# Patient Record
Sex: FEMALE | Race: BLACK OR AFRICAN AMERICAN | Employment: OTHER | URBAN - METROPOLITAN AREA
[De-identification: names, ages, dates, MRNs, and addresses within clinical notes are randomized per-mention and may not be internally consistent; named-entity substitution may affect disease eponyms.]

---

## 2018-03-19 LAB
ANTIBODY: NON REACTIVE
HIV AG/AB: NON REACTIVE

## 2020-08-28 ENCOUNTER — VIRTUAL VISIT (OUTPATIENT)
Dept: FAMILY MEDICINE CLINIC | Age: 55
End: 2020-08-28
Payer: MEDICAID

## 2020-08-28 DIAGNOSIS — F51.01 PRIMARY INSOMNIA: ICD-10-CM

## 2020-08-28 DIAGNOSIS — F32.89 OTHER DEPRESSION: ICD-10-CM

## 2020-08-28 DIAGNOSIS — F51.01 PRIMARY INSOMNIA: Primary | ICD-10-CM

## 2020-08-28 DIAGNOSIS — G89.29 OTHER CHRONIC PAIN: ICD-10-CM

## 2020-08-28 DIAGNOSIS — J45.41 MODERATE PERSISTENT ASTHMA WITH ACUTE EXACERBATION: Primary | ICD-10-CM

## 2020-08-28 PROCEDURE — 99203 OFFICE O/P NEW LOW 30 MIN: CPT | Performed by: NURSE PRACTITIONER

## 2020-08-28 RX ORDER — ALBUTEROL SULFATE 90 UG/1
2 AEROSOL, METERED RESPIRATORY (INHALATION)
COMMUNITY
End: 2022-08-23 | Stop reason: SDUPTHER

## 2020-08-28 RX ORDER — ZOLPIDEM TARTRATE 10 MG/1
10 TABLET ORAL
COMMUNITY
End: 2020-08-28 | Stop reason: SDUPTHER

## 2020-08-28 RX ORDER — FLUTICASONE PROPIONATE 50 MCG
2 SPRAY, SUSPENSION (ML) NASAL DAILY
COMMUNITY
End: 2020-08-28 | Stop reason: SDUPTHER

## 2020-08-28 RX ORDER — MELOXICAM 15 MG/1
15 TABLET ORAL DAILY
COMMUNITY
End: 2020-09-29

## 2020-08-28 RX ORDER — DULOXETIN HYDROCHLORIDE 60 MG/1
60 CAPSULE, DELAYED RELEASE ORAL DAILY
COMMUNITY
End: 2020-09-29

## 2020-08-28 RX ORDER — PANTOPRAZOLE SODIUM 40 MG/1
40 TABLET, DELAYED RELEASE ORAL DAILY
COMMUNITY
End: 2020-09-29

## 2020-08-28 RX ORDER — VENLAFAXINE HYDROCHLORIDE 150 MG/1
150 CAPSULE, EXTENDED RELEASE ORAL DAILY
COMMUNITY
End: 2020-09-29

## 2020-08-28 RX ORDER — CYCLOBENZAPRINE HCL 5 MG
5 TABLET ORAL
COMMUNITY
End: 2020-08-28 | Stop reason: SDUPTHER

## 2020-08-28 RX ORDER — PREDNISONE 10 MG/1
10 TABLET ORAL DAILY
COMMUNITY
End: 2020-09-29

## 2020-08-28 RX ORDER — MONTELUKAST SODIUM 10 MG/1
10 TABLET ORAL DAILY
COMMUNITY
End: 2020-08-28 | Stop reason: SDUPTHER

## 2020-08-28 RX ORDER — HYDROCODONE BITARTRATE AND ACETAMINOPHEN 5; 325 MG/1; MG/1
2 TABLET ORAL
COMMUNITY

## 2020-08-28 RX ORDER — METHYLPREDNISOLONE 4 MG/1
TABLET ORAL
Qty: 1 DOSE PACK | Refills: 0 | Status: SHIPPED | OUTPATIENT
Start: 2020-08-28 | End: 2020-09-29

## 2020-08-28 RX ORDER — METAXALONE 800 MG/1
800 TABLET ORAL 2 TIMES DAILY
COMMUNITY
End: 2020-09-29

## 2020-08-28 RX ORDER — FLUTICASONE PROPIONATE AND SALMETEROL 250; 50 UG/1; UG/1
1 POWDER RESPIRATORY (INHALATION) 2 TIMES DAILY
COMMUNITY
End: 2020-09-29

## 2020-08-28 NOTE — PROGRESS NOTES
Chief Complaint   Patient presents with    Establish Care     History of Rheumatoid Arthritis, Allergies , Asthma  . Has an referral for a rheumatoid arthritis     Asthma     has been having some breathing issues even with inhaler and prednisone .       Sleep Problem

## 2020-08-28 NOTE — PROGRESS NOTES
Tahir Banks  50 y.o. female  1965  [facility]  [place]. [place] [address]  [phone]     [facility]       Encounter Date: 8/28/2020           Established Patient Visit Note: Leilani Campos NP    Reason for Appointment:  Chief Complaint   Patient presents with   [address] [place] [address] Care     History of Rheumatoid Arthritis, Allergies , Asthma  . Has an referral for a rheumatoid arthritis     Asthma     has been having some breathing issues even with inhaler and prednisone .  Sleep Problem       History of Present Illness:  History provided by patient    Tahir Banks is a 50 y.o. female who presents today for SOB and excerebration of astma. She is taking her inhalers and maira henderson takes 10 mg prednisone daily. She is a new patient from [address] [place] [address]. She needs to establish with pain management and a rheumatologist       Review of Systems  Review of Systems   Constitutional: Negative. HENT: Negative. Respiratory: Positive for cough, shortness of breath and wheezing. Cardiovascular: Negative. Gastrointestinal: Negative. Psychiatric/Behavioral: The patient has insomnia. Allergies: Amoxicillin and Azithromycin    Medications: (Updated to reflect final medication list after visit)    Current Outpatient Medications:     HYDROcodone-acetaminophen (NORCO) 5-325 mg per tablet, Take 2 Tabs by mouth every four (4) hours as needed for Pain., Disp: , Rfl:     predniSONE (DELTASONE) 10 mg tablet, Take 10 mg by mouth daily. , Disp: , Rfl:     albuterol (PROVENTIL HFA, VENTOLIN HFA, PROAIR HFA) 90 mcg/actuation inhaler, Take 2 Puffs by inhalation every four (4) hours as needed for Wheezing., Disp: , Rfl:     fluticasone propion-salmeteroL (Advair Diskus) 250-50 mcg/dose diskus inhaler, Take 1 Puff by inhalation two (2) times a day., Disp: , Rfl:     montelukast (SINGULAIR) 10 mg tablet, Take 10 mg by mouth daily. , Disp: , Rfl:     zolpidem (AMBIEN) 10 mg tablet, Take 10 mg by mouth nightly as needed for Sleep., Disp: , Rfl:     venlafaxine-SR (EFFEXOR-XR) 150 mg capsule, Take 150 mg by mouth daily. , Disp: , Rfl:     meloxicam (MOBIC) 15 mg tablet, Take 15 mg by mouth daily. , Disp: , Rfl:     pantoprazole (PROTONIX) 40 mg tablet, Take 40 mg by mouth daily. , Disp: , Rfl:     cyclobenzaprine (FLEXERIL) 5 mg tablet, Take 5 mg by mouth nightly., Disp: , Rfl:     metaxalone (SKELAXIN) 800 mg tablet, Take 800 mg by mouth two (2) times a day., Disp: , Rfl:     DULoxetine (CYMBALTA) 60 mg capsule, Take 60 mg by mouth daily. , Disp: , Rfl:     fluticasone propionate (Flonase Allergy Relief) 50 mcg/actuation nasal spray, 2 Sprays by Both Nostrils route daily. , Disp: , Rfl:     methylPREDNISolone (MEDROL DOSEPACK) 4 mg tablet, Use as directed, Disp: 1 Dose Pack, Rfl: 0    History  Patient Care Team:  Caleb Pro NP as PCP - General (Family Medicine)    Past Medical History: she has a past medical history of Asthma and Rheumatoid arthritis ([facility] [address] Past Surgical History: she has a past surgical history that includes hx knee arthroscopy. Family Medical History: family history includes Arthritis-osteo in her mother; Arthritis-rheumatoid in her father; Hypertension in her mother; Stroke in her father. Social History: she reports that she has never smoked. She has never used smokeless tobacco. She reports that she does not drink alcohol or use drugs. No specialty comments available. Objective:   Vital Signs  Unable to obtain vital signs today as patient does not have equipment for this at home    Physical Exam  Constitutional:       Appearance: Normal appearance. HENT:      Head: Normocephalic. Right Ear: External ear normal.      Left Ear: External ear normal.   Neck:      Musculoskeletal: Normal range of motion and neck supple. Neurological:      Mental Status: She is alert.    Psychiatric:         Mood and Affect: Mood normal.         Thought Content: Thought content normal.         Assessment & Plan:    1. Moderate persistent asthma with acute exacerbation    - methylPREDNISolone (MEDROL DOSEPACK) 4 mg tablet; Use as directed  Dispense: 1 Dose Pack; Refill: 0  - REFERRAL TO PULMONARY DISEASE    2. Primary insomnia      3. Other depression        I was in the office while conducting this encounter. Consent:  She and/or her healthcare decision maker is aware that this patient-initiated Telehealth encounter is a billable service, with coverage as determined by her insurance carrier. She is aware that she may receive a bill and has provided verbal consent to proceed: Yes    This virtual visit was conducted via [employer]. Pursuant to the emergency declaration under the [facility], 1135 waiver authority and the [employer] and [employer] Act, this Virtual  Visit was conducted to reduce the patient's risk of exposure to COVID-19 and provide continuity of care for an established patient. Services were provided through a video synchronous discussion virtually to substitute for in-person clinic visit. Due to this being a TeleHealth evaluation, many elements of the physical examination are unable to be assessed. Total Time: minutes: 21-30 minutes. I have discussed the diagnosis with the patient and the intended plan as seen in the above orders. The patient has received an after-visit summary along with patient information handout. I have discussed medication side effects and warnings with the patient as well.     Disposition        Lucretia Carrasco NP

## 2020-08-28 NOTE — TELEPHONE ENCOUNTER
Chief Complaint   Patient presents with    Medication Refill     Flonase, Zolpidem, Flexeril and Singulair      Patient seen virtually to establish care on 8/28/2020.   Shahab Loaiza LPN

## 2020-08-30 RX ORDER — ZOLPIDEM TARTRATE 10 MG/1
10 TABLET ORAL
Qty: 90 TAB | Refills: 1 | Status: SHIPPED | OUTPATIENT
Start: 2020-08-30 | End: 2021-02-26

## 2020-08-30 RX ORDER — CYCLOBENZAPRINE HCL 5 MG
5 TABLET ORAL
Qty: 90 TAB | Refills: 1 | Status: SHIPPED | OUTPATIENT
Start: 2020-08-30 | End: 2021-02-26

## 2020-08-30 RX ORDER — MONTELUKAST SODIUM 10 MG/1
10 TABLET ORAL DAILY
Qty: 90 TAB | Refills: 1 | Status: SHIPPED | OUTPATIENT
Start: 2020-08-30 | End: 2021-02-26

## 2020-08-30 RX ORDER — FLUTICASONE PROPIONATE 50 MCG
2 SPRAY, SUSPENSION (ML) NASAL DAILY
Qty: 1 BOTTLE | Refills: 3 | Status: SHIPPED | OUTPATIENT
Start: 2020-08-30 | End: 2021-02-26

## 2020-09-29 ENCOUNTER — OFFICE VISIT (OUTPATIENT)
Dept: RHEUMATOLOGY | Age: 55
End: 2020-09-29
Payer: MEDICAID

## 2020-09-29 VITALS
TEMPERATURE: 98.3 F | SYSTOLIC BLOOD PRESSURE: 146 MMHG | RESPIRATION RATE: 18 BRPM | DIASTOLIC BLOOD PRESSURE: 82 MMHG | WEIGHT: 241 LBS | HEART RATE: 81 BPM

## 2020-09-29 DIAGNOSIS — R76.8 POSITIVE ANTI-CCP TEST: ICD-10-CM

## 2020-09-29 DIAGNOSIS — R20.2 PARESTHESIA: ICD-10-CM

## 2020-09-29 DIAGNOSIS — F43.9 STRESS: ICD-10-CM

## 2020-09-29 DIAGNOSIS — M79.7 FIBROMYALGIA: Primary | ICD-10-CM

## 2020-09-29 PROCEDURE — 99205 OFFICE O/P NEW HI 60 MIN: CPT | Performed by: INTERNAL MEDICINE

## 2020-09-29 RX ORDER — BUDESONIDE AND FORMOTEROL FUMARATE DIHYDRATE 160; 4.5 UG/1; UG/1
2 AEROSOL RESPIRATORY (INHALATION)
COMMUNITY

## 2020-09-29 RX ORDER — BISMUTH SUBSALICYLATE 262 MG
1 TABLET,CHEWABLE ORAL DAILY
COMMUNITY

## 2020-09-29 RX ORDER — FEXOFENADINE HCL AND PSEUDOEPHEDRINE HCI 60; 120 MG/1; MG/1
1 TABLET, EXTENDED RELEASE ORAL EVERY 12 HOURS
COMMUNITY

## 2020-09-29 NOTE — LETTER
9/29/20 Patient: Maddison Colbert YOB: 1965 Date of Visit: 9/29/2020 Abner Pozo NP 
[address] [id number] VIA In Basket Dear Abner Pozo NP, Thank you for referring Ms. Maddison Colbert to [address] [place] [address] for evaluation. My notes for this consultation are attached. If you have questions, please do not hesitate to call me. I look forward to following your patient along with you.  
 
 
Sincerely, 
 
Gian Ramachandran MD

## 2020-09-29 NOTE — PATIENT INSTRUCTIONS
FIBROMYALGIA Fibromyalgia is a disease characterized by chronic widespread musculoskeletal pain. Fibromyalgia is caused by abnormal processing of pain signals in the central nervous system, leading to exaggerated pain responses. There are functional MRI studies that have shown neuroplasticity (re-wiring) of the pain pathways in the brain. Physical and Mental Exercise The mainstay of therapy includes non-pharmacologic therapies such as cardiovascular exercise and Cognitive Behavioral Therapy which have been shown to be of benefit ([facility], Am J Med 2009). Guido Chi in particular has proven efficacy in the treatment of fibromyalgia Archie Clarke et al. [place] Pay[employer] J Med 2010; 871:756-775). Performing at least 60 minutes per day of Ross Spine has been shown to improve pain without medical management. Medications After discussing with your primary care and if medications are pursued, pregabalin (Lyrica), gabapentin (Neurontin), milnacipran (Marcelene Armando), and duloxetine (Cymbalta) are FDA approved medications for the treatment of fibromyalgia. Narcotic Pain Medications Are BAD Narcotics, such as oxycodone, hydrocodone, morphine, dilaudid, or codeine, have not been proven to be efficacious in the treatment of fibromyalgia. In fact, narcotic use in this patient population has been observed to exacerbate depression, and may enhance the hyperalgesia which is characteristic of this condition (Sonia Enter Rheum 2006). They also are at increased risk for opioid-induced hyperalgesia due predominantly to central sensitization Babak Momin al. [facility] Clin Rheumatol. 2013 Mar;19(2):72-7). Specifically, a double-blind placebo-controlled trial by Owen Nam al published in 1995 demonstrated that intravenous morphine did not reduce pain in fibromyalgia patients.  A study by Raquel Corona al published in 2003 showed that fibromyalgia patients taking oral opiates did not experience improvement in their pain at four years of follow up, and also reported increased depression over the last two years of the study. There is subsequent concern that the prolonged use of narcotics to treat fibromyalgia may cause harm to these patients Elsy Montoya, Pain 2005). Finally, opioid use in fibromyalgia had poorer symptoms and functional and occupational status compared to nonusers (Daly OCHOA et al. Pain Res Treat. 9189;1094:087795). Therefore, I recommend that narcotics be avoided in all patients with fibromyalgia. My Recommendations I recommend Guido Chi stretching exercises for at least 30 minutes per day. The Arthritis Foundation has made a videotape of Guido Chi that you can borrow from [employer], purchase online or watch for free on [employer]. com Guido Chi for Arthritis. I strongly recommend you to join a gym that has an indoor pool, to do aqua therapy and Guido Chi classes. Resources include: [facility], [facility], [facility], and the [facility]. We discussed treating secondary causes, such as sleep apnea, poor sleep quality, depression, anxiety, vitamin deficiencies, such as vitamin D, and pursuing aquatherapy. I encourage you to do [address]. Tingling and Tremors Please see neurology

## 2020-09-29 NOTE — PROGRESS NOTES
REASON FOR VISIT    This is an evaluation for Ms. Joslyn Romberg a 50 y.o.  female for diffuse pain. The patient is referred to the [facility] at the request of Halie Christina NP.     HISTORY OF PRESENT ILLNESS     I have reviewed and summarized old records from [address] [place] [address],[address], [facility]    In 1/30/2017, she developed pain in her neck, arms, hands, back, knees, feet and toes that was constant. There was no preceding trauma, injury, motor vehicle accident sudden loss, physical or sexual abuse but she recalls suffering with stress due to take care of her mother for 10 years and her father prior to that while working. In 2018, she was evaluated by rheumatology. She did not feel relief with prednisone. She then was given methotrexate 10 mg weekly without relief after one to 2 months. In 11/25/2019, labs showed WBC 5., lymphocytes 2.4, Hct 38.9%, platelets 699,374, creatinine 0.58 mg/dL, eGFR 123, albumin 4.6 g/dL, ALK 85 U/L, ALT 12 U/L, AST 20 U/L, ESR 5 mm/hr, CRP <1 mg/L, CK 85 U/L, uric acid 3.7 mg/dL, positive anti-CCP 34, negative ZANA IFA, rheumatoid factor, anti-dsDNA Ab, anti-RNP, anti-Sm, anti-Ro, anti-La, Quantiferon TB, chronic viral hepatitis panel. In 3/20/2020, labs showed WBC 5.3, Hct 38.3%, platelets 097,908, creatinine 0.60 mg/dL, eGFR 121, ALK 73 U/L, ALT 15 U/L, AST 25 U/L, ESR 10 mm/hr, CRP <1 mg/L. In 3/2020, she was prescribed hydroxychloroquine but she could not get it due to [place]. In 8/28/2020, she was prescribed Medrol for asthma exacerbation, which did not help her pain. In 9/04/2020, Chest radiograph showed HEART AND PULMONARY VESSELS: Heart size is normal. Normal pulmonary blood flow. LUNG PARENCHYMA: Underexpanded lungs. Ill-defined airspace opacity in the left lower lung zone partially obscuring the left heart margin. Remainder of lungs are clear. PLEURA: No pneumothorax or effusion. MEDIASTINUM: Normal mediastinal contour. BONE/SOFT TISSUES: Normal.    In 9/18/2020, CT Chest without contrast showed Lung parenchyma: Patchy airspace opacity in the lingular segment of left upper lobe accounts for opacity seen on previous chest radiograph. Focal infiltrate versus scarring. Calcified granuloma in the left lower lobe posterior laterally. No endobronchial lesion. No pneumothorax. Mediastinum and pleura:  Usual branching anatomy of the aortic arch. No mediastinal mass or adenopathy. No mediastinal fluid collection. No pleural or pericardial effusion. Upper abdomen: Gastric bypass anatomy. Previous cholecystectomy. Bony thorax: Degenerative spondylosis in the thoracic spine. Today, she reports that she has diffuse aching throbbing, stabbing, burning, numbness and shoot constant pain throughout her body. She has a hard time straightening her right elbow over the past 2 weeks. She was prescribed prednisone and Medrol without relief    She endorses numbness or tingling in hands, numbness or tingling in feet, muscle weakness. She also endorses action tremors. She has not seen a neurologist.     She endorses endorses pain in chest with deep breaths, wheezing, productive cough, shortness of breath at rest, shortness of breath on exertion. She is following with pulmonary, Dr. Dorothy Marte, for chronic asthma. REVIEW OF SYSTEMS    A 15 point review of systems was performed and summarized below. The questionnaire was reviewed with the patient and scanned into the patient's medical record.     General: endorses fatigue, weakness, denies recent weight gain, recent weight loss, fever, drenching night sweats  Musculoskeletal: endorses joint pain, morning stiffness (lasting all day), muscle pain, denies joint swelling  Ears: denies ringing in ears, hearing loss, deafness  Eyes: endorses pain, redness, blurred vision, dryness, foreign body sensation, denies light sensitive, blindness, double vision, excess tearing  Mouth: denies sore tongue, oral ulcers, loss of taste, dryness, increased dental caries  Nose: endorses nosebleeds, denies nasal ulcers  Throat: denies food stuck when swallowing, difficulty with swallowing, hoarseness, pain in jaw while chewing  Neck: denies swollen glands, tender glands  Cardiopulmonary: endorses pain in chest with deep breaths, wheezing, productive cough, shortness of breath at rest, shortness of breath on exertion, denies pain in chest when lying down, murmurs, sudden changes in heart beat, dry cough, coughing of blood  Gastrointestinal: denies nausea, heartburn, stomach pain relieved by food, chronic constipation, chronic diarrhea, blood in stools, black stools  Genitourinary: denies vaginal dryness, pain or burning on urination, blood in urine, cloudy urine, vaginal ulcers   Hematologic: denies anemia, bleeding tendency, blood clots, bleeding gums  Skin: endorses easy bruising, denies hair loss, rash, rash worsened after sun exposure, hives/urticaria, skin thickening, skin tightness, nodules/bumps, color changes of hands or feet in the cold (Raynaud's)  Neurologic: endorses numbness or tingling in hands, numbness or tingling in feet, muscle weakness  Psychiatric: endorses excessive worries, denies depression, PTSD, Bipolar  Sleep: endorses poor sleep (5-6 hours), difficulty falling asleep, difficulty staying asleep, denies snoring, apnea, daytime somnolence    PAST MEDICAL HISTORY    She has a past medical history of Asthma. FAMILY HISTORY    Her family history includes Arthritis-osteo in her mother; Arthritis-rheumatoid in her father; Heart Attack in her father; Hypertension in her mother; Stroke in her father. SOCIAL HISTORY    She reports that she has never smoked. She has never used smokeless tobacco. She reports that she does not drink alcohol or use drugs.     GYNECOLOGIC HISTORY     1, Para 1, Living 1, Miscarriage 0    She denies severe pre-eclampsia, eclampsia or placental insufficiency    HEALTH MAINTENANCE    Immunizations    There is no immunization history on file for this patient. Age Appropriate Cancer Screening    PAP Smear: 2019  Mammogram: 2019    MEDICATIONS    Current Outpatient Medications   Medication Sig Dispense Refill    multivitamin (ONE A DAY) tablet Take 1 Tab by mouth daily.  fexofenadine-pseudoephedrine (Allegra-D 12 Hour)  mg Tb12 Take 1 Tab by mouth every twelve (12) hours.  budesonide-formoteroL (Symbicort) 160-4.5 mcg/actuation HFAA Take 2 Puffs by inhalation.  zolpidem (AMBIEN) 10 mg tablet Take 1 Tab by mouth nightly as needed for Sleep. Max Daily Amount: 10 mg. 90 Tab 1    montelukast (SINGULAIR) 10 mg tablet Take 1 Tab by mouth daily. 90 Tab 1    fluticasone propionate (Flonase Allergy Relief) 50 mcg/actuation nasal spray 2 Sprays by Both Nostrils route daily. 1 Bottle 3    cyclobenzaprine (FLEXERIL) 5 mg tablet Take 1 Tab by mouth nightly. 90 Tab 1    HYDROcodone-acetaminophen (NORCO) 5-325 mg per tablet Take 2 Tabs by mouth every four (4) hours as needed for Pain.  albuterol (PROVENTIL HFA, VENTOLIN HFA, PROAIR HFA) 90 mcg/actuation inhaler Take 2 Puffs by inhalation every four (4) hours as needed for Wheezing. ALLERGIES    Allergies   Allergen Reactions    Amoxicillin Rash    Azithromycin Rash       PHYSICAL EXAMINATION    Visit Vitals  BP (!) 146/82   Pulse 81   Temp 98.3 °F (36.8 °C)   Resp 18   Wt 241 lb (109.3 kg)     There is no height or weight on file to calculate BMI. General: Patient is alert, oriented x 3, not in acute distress    HEENT:   Conjunctiva are not injected and appear moist, there is no alopecia. Cardiovascular:  Heart is regular rate and rhythm, no murmurs. Chest:  Lungs are clear to auscultation bilaterally. Extremities:  Free of clubbing, cyanosis, edema, extremities well perfused. Neurological exam:  Muscle strength is full in upper and lower extremities.     Skin exam:  There are no rashes, no psoriasis, no active Raynaud's, no livedo reticularis, no periungual erythema. Musculoskeletal exam:  A comprehensive musculoskeletal exam was performed for all joints of each upper and lower extremity and assessed for swelling, tenderness and range of motion. 18/18 tender points. Diffuse myalgia  Right elbow flexion deformity with normal passive range of movement (full extension) when distracted  Interphalangeal tenderness when asked if there is tenderness, but no tenderness when distracted  Diffuse joint tenderness when asked but none when distracted  Bilateral trochanteric bursa tenderness  Bilateral iliotibial bad tenderness  Right knee replacement  No synovitis    DATA REVIEW    Studies Reviewed:     Prior medical records were reviewed and are summarized as below:    Laboratory data: summarized in the HPI    Imaging: summarized in the HPI. ASSESSMENT AND PLAN    1) Fibromyalgia. her history, constellation of symptoms, and examination are consistent with a pain syndrome. She denies a history of physical and sexual abuse. She denies a history of tragic loss. She denies a history of a preceding motor vehicle accident. she does not see a therapist. She denies a diagnosis of obstructive sleep apnea. She has been suffering from high stress due to taking care of her mother for 10 years while working for 30 years. Fibromyalgia is a disease characterized by chronic widespread musculoskeletal pain. Fibromyalgia is caused by abnormal processing of pain signals in the central nervous system, leading to exaggerated pain responses. Non-pharmacologic therapies such as cardiovascular exercise and Cognitive Behavioral Therapy have been shown to be of benefit ([address], Am J Med 2009). Guido Chi in particular has proven efficacy in the treatment of fibromyalgia YASEMIN Perez 2010).   If pharmacotherapy is pursued, pregabalin (Lyrica), gabapentin (Neurontin), milnacipran (Radonna Oyster), and duloxetine (Cymbalta) are FDA approved medications for the treatment of fibromyalgia. Narcotics have not been proven to be efficacious in the treatment of fibromyalgia. In fact, narcotic use in this patient population has been observed to exacerbate depression, and may enhance the hyperalgesia which is characteristic of this condition (Gabbie Chuy Rheum 2006). They also are at increased risk for opioid-induced hyperalgesia due predominantly to central sensitization Ana Momin al. [facility] Clin Rheumatol. 2013 Mar;19(2):72-7). Specifically, a double-blind placebo-controlled trial by Magan Liu al published in 1995 demonstrated that intravenous morphine did not reduce pain in fibromyalgia patients. A study by Jorge Lynch al published in 2003 showed that fibromyalgia patients taking oral opiates did not experience improvement in their pain at four years of follow up, and also reported increased depression over the last two years of the study. There is subsequent concern that the prolonged use of narcotics to treat fibromyalgia may cause harm to these patients Gita Banegas, Pain 2005). Finally, opioid use in fibromyalgia had poorer symptoms and functional and occupational status compared to nonusers (Daly OCHOA et al. Pain Res Treat. 3980;2708:111955). We therefore recommend that narcotics be avoided in all patients with fibromyalgia. We recommend Guido Chi stretching exercises for at least 30 minutes per day. The Arthritis Foundation has made a videotape of Guido Chi that She can borrow from [employer], purchase online or watch for free on [employer]. [employer] Guido Chi for Arthritis. We discussed treating secondary causes, such as sleep apnea, poor sleep quality, depression, anxiety, weight loss, vitamin deficiencies, such as vitamin D, and pursuing aquatherapy. I encouraged her to do [address]. My recommendations were provided to her and she was informed to follow up with her primary care physician for further management of her fibromyalgia.      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 Her history and exam are not consistent with Rheumatoid Arthritis. She had no relief from prednisone and Medrol or methotrexate 10 mg weekly. She was given prednisone due to asthma exacerbations without relief and most recently Medrol for asthma exacerbation without joint relief. I did not appreciate synovitis on exam. She reports that her right elbow has been in flexion for the past 2 weeks and that Medrol did not help it. When passively examining her elbow and distracting her, she had normal range of movement without pain with full extension. 3) Stress. I recommended she see a therapist to potential help her. 4) Paresthesia and Tremors. I recommended she be evaluated by neurology. The patient voiced understanding of the aforementioned assessment and plan. Summary of plan was provided in the After Visit Summary patient instructions. I also provided education about MyChart setup and utility. TODAY'S ORDERS    None    No future appointments.     Rojelio Walsh MD, [address] [facility]    Adult Rheumatology   Rheumatology Ultrasound Certified  [address]  [address] [facility], [address] [facility]   Phone [phone]  Fax [phone]

## 2020-10-19 ENCOUNTER — TELEPHONE (OUTPATIENT)
Dept: FAMILY MEDICINE CLINIC | Age: 55
End: 2020-10-19

## 2020-10-19 NOTE — TELEPHONE ENCOUNTER
Patient progress notes from 8/28/2020 with virtual visit Chito Davis NP ) faxed to [facility] [phone] with confirmation received.   Nitza Cope LPN

## 2020-10-19 NOTE — TELEPHONE ENCOUNTER
Ms. Elle Workman from [facility] needs a nurse to fax pt's last 3-4 office visits. Pt has an appointment with them coming up.     Fax: [phone]    BCB: [phone]

## 2020-10-23 ENCOUNTER — OFFICE VISIT (OUTPATIENT)
Dept: FAMILY MEDICINE CLINIC | Age: 55
End: 2020-10-23
Payer: MEDICAID

## 2020-10-23 VITALS
DIASTOLIC BLOOD PRESSURE: 76 MMHG | HEART RATE: 86 BPM | RESPIRATION RATE: 16 BRPM | BODY MASS INDEX: 38.89 KG/M2 | TEMPERATURE: 99.1 F | OXYGEN SATURATION: 96 % | SYSTOLIC BLOOD PRESSURE: 118 MMHG | HEIGHT: 66 IN | WEIGHT: 242 LBS

## 2020-10-23 DIAGNOSIS — R53.83 TIRED: ICD-10-CM

## 2020-10-23 DIAGNOSIS — E66.01 SEVERE OBESITY (HCC): ICD-10-CM

## 2020-10-23 DIAGNOSIS — M25.50 ARTHRALGIA, UNSPECIFIED JOINT: Primary | ICD-10-CM

## 2020-10-23 PROCEDURE — 99214 OFFICE O/P EST MOD 30 MIN: CPT | Performed by: NURSE PRACTITIONER

## 2020-10-23 RX ORDER — DICLOFENAC SODIUM 75 MG/1
75 TABLET, DELAYED RELEASE ORAL 2 TIMES DAILY
Qty: 30 TAB | Refills: 0 | Status: SHIPPED | OUTPATIENT
Start: 2020-10-23 | End: 2021-01-15

## 2020-10-23 NOTE — PROGRESS NOTES
Chief Complaint   Patient presents with    Arm Pain     bilateral     Hand Pain     bilateral     Knee Pain     bilateral with feet pain      Patient pain management  :  Dr. Courtney Loera ; will be seeing in November 2020 for injections. 1. Have you been to the ER, urgent care clinic since your last visit? Hospitalized since your last visit? No    2. Have you seen or consulted any other health care providers outside of the [address] [place] [address] since your last visit? Include any pap smears or colon screening.  No

## 2020-10-23 NOTE — PROGRESS NOTES
[facility] Note  Subjective:      Onur Purcell is a 50 y.o. female who presents for patient is complaining of bilateral arm pain and right elbow and hand pain,  bilateral knee pain. She also reports feeling tired most of the time. She reports that is has made appointment with a rheumatologist and will see them in March, in the mean time she needs medication to help her. She is taking vitamin B12 500,vitamin D3, 600 international unit, daily . Past Medical History:   Diagnosis Date    Asthma      Past Surgical History:   Procedure Laterality Date    HX  SECTION      HX CHOLECYSTECTOMY      HX KNEE ARTHROSCOPY      HX RHINOPLASTY         Current Outpatient Medications   Medication Sig Dispense Refill    diclofenac EC (VOLTAREN) 75 mg EC tablet Take 1 Tab by mouth two (2) times a day. 30 Tab 0    multivitamin (ONE A DAY) tablet Take 1 Tab by mouth daily.  fexofenadine-pseudoephedrine (Allegra-D 12 Hour)  mg Tb12 Take 1 Tab by mouth every twelve (12) hours.  budesonide-formoteroL (Symbicort) 160-4.5 mcg/actuation HFAA Take 2 Puffs by inhalation.  zolpidem (AMBIEN) 10 mg tablet Take 1 Tab by mouth nightly as needed for Sleep. Max Daily Amount: 10 mg. 90 Tab 1    montelukast (SINGULAIR) 10 mg tablet Take 1 Tab by mouth daily. 90 Tab 1    fluticasone propionate (Flonase Allergy Relief) 50 mcg/actuation nasal spray 2 Sprays by Both Nostrils route daily. 1 Bottle 3    cyclobenzaprine (FLEXERIL) 5 mg tablet Take 1 Tab by mouth nightly. 90 Tab 1    HYDROcodone-acetaminophen (NORCO) 5-325 mg per tablet Take 2 Tabs by mouth every four (4) hours as needed for Pain.  albuterol (PROVENTIL HFA, VENTOLIN HFA, PROAIR HFA) 90 mcg/actuation inhaler Take 2 Puffs by inhalation every four (4) hours as needed for Wheezing.        Allergies   Allergen Reactions    Amoxicillin Rash    Azithromycin Rash       ROS:   Complete review of systems was reviewed with pertinent information listed in HPI. Review of Systems   Constitutional: Negative. HENT: Negative. Respiratory: Negative. Cardiovascular: Negative. Gastrointestinal: Negative. Musculoskeletal: Positive for joint pain. Objective:     Visit Vitals  /76 (BP 1 Location: Left arm, BP Patient Position: Sitting)   Pulse 86   Temp 99.1 °F (37.3 °C) (Oral)   Resp 16   Ht 5' 6\" (1.676 m)   Wt 242 lb (109.8 kg)   SpO2 96%   BMI 39.06 kg/m²       Vitals and Nurse Documentation reviewed. Physical Exam  Constitutional:       Appearance: Normal appearance. She is obese. HENT:      Mouth/Throat:      Mouth: Mucous membranes are moist.   Neck:      Musculoskeletal: Normal range of motion and neck supple. Cardiovascular:      Rate and Rhythm: Normal rate and regular rhythm. Pulses: Normal pulses. Heart sounds: Normal heart sounds. No murmur. Pulmonary:      Effort: Pulmonary effort is normal.      Breath sounds: Normal breath sounds. Abdominal:      General: Bowel sounds are normal.      Palpations: Abdomen is soft. Musculoskeletal:         General: Tenderness present. Comments: Bilateral knee joint pain, right elbow and arm ans hand pain    Neurological:      Mental Status: She is alert. Psychiatric:         Mood and Affect: Mood normal.         Thought Content: Thought content normal.         Assessment/Plan:     Diagnoses and all orders for this visit:    1. Arthralgia, unspecified joint  -     SED RATE (ESR)  -     diclofenac EC (VOLTAREN) 75 mg EC tablet; Take 1 Tab by mouth two (2) times a day. 2. Severe obesity ([address]    3. Tired  -     CBC W/O DIFF  -     METABOLIC PANEL, COMPREHENSIVE  -     TSH 3RD GENERATION  -     T4, FREE          Pt expressed understanding with the diagnosis and plan        Discussed expected course/resolution/complications of diagnosis in detail with patient.    Medication risks/benefits/costs/interactions/alternatives discussed with patient.    Pt was given an after visit summary which includes diagnoses, current medications & vitals.  Pt expressed understanding with the diagnosis and plan

## 2020-10-24 LAB
ALBUMIN SERPL-MCNC: 4.3 G/DL (ref 3.8–4.9)
ALBUMIN/GLOB SERPL: 1.3 {RATIO} (ref 1.2–2.2)
ALP SERPL-CCNC: 97 IU/L (ref 39–117)
ALT SERPL-CCNC: 12 IU/L (ref 0–32)
AST SERPL-CCNC: 22 IU/L (ref 0–40)
BILIRUB SERPL-MCNC: 0.8 MG/DL (ref 0–1.2)
BUN SERPL-MCNC: 14 MG/DL (ref 6–24)
BUN/CREAT SERPL: 24 (ref 9–23)
CALCIUM SERPL-MCNC: 9.7 MG/DL (ref 8.7–10.2)
CHLORIDE SERPL-SCNC: 102 MMOL/L (ref 96–106)
CO2 SERPL-SCNC: 24 MMOL/L (ref 20–29)
CREAT SERPL-MCNC: 0.59 MG/DL (ref 0.57–1)
ERYTHROCYTE [DISTWIDTH] IN BLOOD BY AUTOMATED COUNT: 11.5 % (ref 11.7–15.4)
ERYTHROCYTE [SEDIMENTATION RATE] IN BLOOD BY WESTERGREN METHOD: 12 MM/HR (ref 0–40)
GLOBULIN SER CALC-MCNC: 3.2 G/DL (ref 1.5–4.5)
GLUCOSE SERPL-MCNC: 103 MG/DL (ref 65–99)
HCT VFR BLD AUTO: 38.1 % (ref 34–46.6)
HGB BLD-MCNC: 12.6 G/DL (ref 11.1–15.9)
MCH RBC QN AUTO: 29.5 PG (ref 26.6–33)
MCHC RBC AUTO-ENTMCNC: 33.1 G/DL (ref 31.5–35.7)
MCV RBC AUTO: 89 FL (ref 79–97)
PLATELET # BLD AUTO: 317 X10E3/UL (ref 150–450)
POTASSIUM SERPL-SCNC: 4.2 MMOL/L (ref 3.5–5.2)
PROT SERPL-MCNC: 7.5 G/DL (ref 6–8.5)
RBC # BLD AUTO: 4.27 X10E6/UL (ref 3.77–5.28)
SODIUM SERPL-SCNC: 140 MMOL/L (ref 134–144)
T4 FREE SERPL-MCNC: 0.93 NG/DL (ref 0.82–1.77)
TSH SERPL DL<=0.005 MIU/L-ACNC: 3.57 UIU/ML (ref 0.45–4.5)
WBC # BLD AUTO: 5.1 X10E3/UL (ref 3.4–10.8)

## 2020-11-03 ENCOUNTER — VIRTUAL VISIT (OUTPATIENT)
Dept: FAMILY MEDICINE CLINIC | Age: 55
End: 2020-11-03
Payer: COMMERCIAL

## 2020-11-03 DIAGNOSIS — E66.01 SEVERE OBESITY (HCC): ICD-10-CM

## 2020-11-03 DIAGNOSIS — M25.50 ARTHRALGIA, UNSPECIFIED JOINT: Primary | ICD-10-CM

## 2020-11-03 PROCEDURE — 99214 OFFICE O/P EST MOD 30 MIN: CPT | Performed by: NURSE PRACTITIONER

## 2020-11-03 NOTE — PROGRESS NOTES
Loren Ferrari  50 y.o. female  1965  [address]  [place]. [facility] [address]  [id number]     [address] [facility]       Encounter Date: 11/3/2020           Established Patient Visit Note: Miriam Loredo NP    Reason for Appointment:  Chief Complaint   Patient presents with    Other     arthralgia       History of Present Illness:  History provided by patient    Loren Ferrari is a 50 y.o. female who presents today for generalized joint pain, she is negative for RA  She has appointemnt with rheumatologist in March. She states that she has been out of work due to severe pain on her joints. She is followed by pain management. Review of Systems  Review of Systems   Constitutional: Negative. Cardiovascular: Negative. Gastrointestinal: Negative. Musculoskeletal: Positive for joint pain. Allergies: Amoxicillin and Azithromycin    Medications: (Updated to reflect final medication list after visit)    Current Outpatient Medications:     diclofenac EC (VOLTAREN) 75 mg EC tablet, Take 1 Tab by mouth two (2) times a day., Disp: 30 Tab, Rfl: 0    multivitamin (ONE A DAY) tablet, Take 1 Tab by mouth daily. , Disp: , Rfl:     fexofenadine-pseudoephedrine (Allegra-D 12 Hour)  mg Tb12, Take 1 Tab by mouth every twelve (12) hours. , Disp: , Rfl:     budesonide-formoteroL (Symbicort) 160-4.5 mcg/actuation HFAA, Take 2 Puffs by inhalation. , Disp: , Rfl:     zolpidem (AMBIEN) 10 mg tablet, Take 1 Tab by mouth nightly as needed for Sleep. Max Daily Amount: 10 mg., Disp: 90 Tab, Rfl: 1    montelukast (SINGULAIR) 10 mg tablet, Take 1 Tab by mouth daily. , Disp: 90 Tab, Rfl: 1    fluticasone propionate (Flonase Allergy Relief) 50 mcg/actuation nasal spray, 2 Sprays by Both Nostrils route daily. , Disp: 1 Bottle, Rfl: 3    HYDROcodone-acetaminophen (NORCO) 5-325 mg per tablet, Take 2 Tabs by mouth every four (4) hours as needed for Pain., Disp: , Rfl:     albuterol (PROVENTIL HFA, VENTOLIN HFA, PROAIR HFA) 90 mcg/actuation inhaler, Take 2 Puffs by inhalation every four (4) hours as needed for Wheezing., Disp: , Rfl:     cyclobenzaprine (FLEXERIL) 5 mg tablet, Take 1 Tab by mouth nightly., Disp: 90 Tab, Rfl: 1    History  Patient Care Team:  Jag Sher NP as PCP - General (Family Medicine)  Jag Sher NP as PCP - [facility] Empaneled Provider    Past Medical History: she has a past medical history of Asthma. Past Surgical History: she has a past surgical history that includes hx rhinoplasty; hx cholecystectomy; hx  section; and hx knee arthroscopy. Family Medical History: family history includes Arthritis-osteo in her mother; Arthritis-rheumatoid in her father; Heart Attack in her father; Hypertension in her mother; Stroke in her father. Social History: she reports that she has never smoked. She has never used smokeless tobacco. She reports that she does not drink alcohol or use drugs. No specialty comments available. Objective:   Vital Signs  Unable to obtain vital signs today as patient does not have equipment for this at home    Physical Exam  Constitutional:       Appearance: Normal appearance. She is obese. Neck:      Musculoskeletal: Normal range of motion and neck supple. Neurological:      Mental Status: She is alert. Psychiatric:         Mood and Affect: Mood normal.         Thought Content: Thought content normal.         Assessment & Plan:  Diagnoses and all orders for this visit:    1. Arthralgia, unspecified joint  -     REFERRAL TO PHYSICAL THERAPY    2. Severe obesity ([address]                  I was in the office while conducting this encounter. Consent:  She and/or her healthcare decision maker is aware that this patient-initiated Telehealth encounter is a billable service, with coverage as determined by her insurance carrier.  She is aware that she may receive a bill and has provided verbal consent to proceed: Yes    This virtual visit was conducted via [employer]. Pursuant to the emergency declaration under the [facility], [facility] waiver authority and the [employer] and [employer] Act, this Virtual  Visit was conducted to reduce the patient's risk of exposure to COVID-19 and provide continuity of care for an established patient. Services were provided through a video synchronous discussion virtually to substitute for in-person clinic visit. Due to this being a TeleHealth evaluation, many elements of the physical examination are unable to be assessed. Total Time: minutes: 11-20 minutes. I have discussed the diagnosis with the patient and the intended plan as seen in the above orders. The patient has received an after-visit summary along with patient information handout. I have discussed medication side effects and warnings with the patient as well.     Disposition        Samuel Villela, NP

## 2020-11-03 NOTE — PROGRESS NOTES
Chief Complaint   Patient presents with    Other     arthralgia     1. Have you been to the ER, urgent care clinic since your last visit? Hospitalized since your last visit? Yes When: october 2020 Where: [place] Reason for visit: pain    2. Have you seen or consulted any other health care providers outside of the [address] [place] [address] since your last visit? Include any pap smears or colon screening.  No

## 2020-11-19 ENCOUNTER — HOSPITAL ENCOUNTER (OUTPATIENT)
Dept: PHYSICAL THERAPY | Age: 55
Discharge: HOME | End: 2020-11-19
Payer: MEDICAID

## 2020-11-19 PROCEDURE — 97161 PT EVAL LOW COMPLEX 20 MIN: CPT | Performed by: PHYSICAL THERAPIST

## 2020-11-19 NOTE — PROGRESS NOTES
Physical Therapy at [facility],   a part of [facility]  [address]  [place], [facility]  Phone: [phone]  Fax: [phone]    Plan of Care/Statement of Necessity for Physical Therapy Services  2-15    Patient name: Ewa Milner  : 1965  Provider#: [id number]  Referral source: Fadi Coleman NP      Medical/Treatment Diagnosis: Neck pain [M54.2]  Left shoulder pain [M25.512]  Right shoulder pain [M25.511]     Prior Hospitalization: see medical history     Comorbidities: see evaluation  Prior Level of Function: see evaluation  Medications: Verified on Patient Summary List    Start of Care: 2020      Onset Date: chronic       The Plan of Care and following information is based on the information from the initial evaluation. Assessment/ key information: Pt is a 50year old female presenting with generalized body aches, pain, dec'd strength, dec'd ROM.  She will benefit from PT to address problem list below    Evaluation Complexity History HIGH Complexity :3+ comorbidities / personal factors will impact the outcome/ POC ; Examination LOW Complexity : 1-2 Standardized tests and measures addressing body structure, function, activity limitation and / or participation in recreation  ;Presentation LOW Complexity : Stable, uncomplicated  ;Clinical Decision Making MEDIUM Complexity : FOTO score of 26-74  Overall Complexity Rating: LOW     Problem List: pain affecting function, decrease ROM, decrease strength, impaired gait/ balance, decrease ADL/ functional abilitiies, decrease activity tolerance, decrease flexibility/ joint mobility and decrease transfer abilities   Treatment Plan may include any combination of the following: Therapeutic exercise, Therapeutic activities, Neuromuscular re-education, Physical agent/modality, Gait/balance training, Manual therapy, Patient education, Self Care training, Functional mobility training, Home safety training and Stair training  Patient / Family readiness to learn indicated by: asking questions, trying to perform skills and interest  Persons(s) to be included in education: patient (P)  Barriers to Learning/Limitations: None  Patient Goal (s): see jose r  Patient Self Reported Health Status: poor  Rehabilitation Potential: good    Short Term Goals: To be accomplished in 2-3 weeks:  1) Pt will be independent in initial HEP  2) Pt will report >/=25% improvement in symptoms  3) Pt will report compliance with heat regimen    Long Term Goals: To be accomplished in 4-6 weeks:  1) Pt will improve bilat cervical AROM rot to >/=45 deg in order to assist in daily chores  2) Pt will report being able to stand for >/=10 min in order to assist in doing dishes, cleaning  3) Pt will report >/=50% improvement in symptoms  4) Pt will improve bilat shoulder AROM flex to >/=120 deg in order to assist in reaching into cabinets     Frequency / Duration: Patient to be seen 1-2 times per week for 4-6 weeks. Patient/ Caregiver education and instruction: self care, activity modification and exercises    [x]  Plan of care has been reviewed with CANDY Craig, PT 11/19/2020   ________________________________________________________________________    I certify that the above Therapy Services are being furnished while the patient is under my care. I agree with the treatment plan and certify that this therapy is necessary.     [de-identified] Signature:____________________  Date:____________Time: _________

## 2020-11-19 NOTE — PROGRESS NOTES
[facility]  [address], [place], [address]  Phone: [phone]  Fax: [phone]    PT INITIAL EVALUATION NOTE - [facility] 2-15    Patient Name: Norah Purdy  Date:2020  : 1965  [x]  Patient  Verified  Payor: Sherolyn Landau / Plan: [facility] / Product Type: Managed Care Medicaid /    In time: 12:40 P  Out time: 1:20 P  Total Treatment Time (min): 40  Total Timed Codes (min): 15  1:1 Treatment Time ( [address] only): 40   Visit #: 1     Treatment Area: Neck pain [M54.2]  Left shoulder pain [M25.512]  Right shoulder pain [M25.511]    Subjective: Any medication changes, allergies to medications, adverse drug reactions, diagnosis change, or new procedure performed?: [] No    [x] Yes (see summary    Date of onset/injury:   Pt presents with generalized body pain, aches. 2 visits to the ER. Onset of symptoms approx 2-3 years ago. She recently moved to [facility]; she was told she has RA. \"I was just told I have fibromyalgia\". She has had MRI's, x-ray sof her back and neck-- DDD in neck and lower back. Constant feet, leg, back, neck, shoulder pain. She has seen Dr. Simi Moncada at [address] [place] [address] for her R arm pain (recently increase in symptoms over the past 4 weeks) and has MRI scheduled for next week. \"it is hard to hold my head up\"  She has a cane, walker, wheelchair at home. Uses her cane regularly but did not bring it today. She states she falls regularly-- last time was a few weeks ago in her kitchen     Pain:   10/10 max 6-7/10 min 7/10 now       Aggravated by: inc'd activity  Eased by: rest, pain meds    Location and description of symptoms: generalized body aches-- feet, legs, back, neck, shoulders, hands. \"neuropathy\"  Diagnostic Tests: [] Lab work [x] X-rays    [] CT [x] MRI     [] Other:  Results (per report of the patient): see above    Social/Recreation/Work: Not currently working-- hasn't worked since May.  Used to be an executive assistant. Prior level of function: pain for approx 2-3 years  Patient goal(s): \"to understand my limitations\"  PMH: Significant for fibromyalgia, depression, arthritis, visual impairments; 2 bunionectomy, R TKR, bilat heel spur removal; gallbladder removed; lumpectomy; partial hysterectomy    Objective:      Observation/posture:  Poor sitting and standing posture. Pt guarding L UE  Transfers: difficulty, inc'd time to perform all transfers  Gait: ambulates independently without AD. wide RAUL, inc'd trunk lean bilat    Cervical Active Movements:   AROM Degrees Comments:pain, area   Forward flexion 18 p! Extension 31 p! Rotation right 28 p! Rotation left 25 P! Shoulder AROM:  R flex= 90 (p!)  L flex= WNL    R abd= 61 (p!)  L abd=92 (p!)    Strength:  MMT UE NT    R (0-5) L (0-5)   Hip flexion 3+ 3+   Knee ext 4 4   Knee flex 4 4     Supine bridge 25%-- P!  In neck  Supine SLR approx 25% bilat    Outcome Measure: Patient presents with a FOTO Score of  NT.      15 min Therapeutic Exercise:  [x] See flow sheet : instructed in HEP   Rationale: increase ROM and increase strength to improve the patients ability to perform ADL's, walk with dec'd symptoms          With   [x] TE   [] TA   [] neuro   [] other: Patient Education: [x] Review HEP    [] Progressed/Changed HEP based on:   [x] positioning   [] body mechanics   [] transfers   [x] heat/ice application    [x] other: karla/phys ; PT POC; importance of performing HEP in order to maximize benefit of PT; use of heat for pain management; encouraged pt to stay active during the day--- to avoid sitting for >45-60 min bouts     Pain Level (0-10 scale) post treatment: not reported    Assessment:  [x] See POC  [] Other:    Plan:   [x] See Brenden Hayes PT DPT     11/19/2020  12:41 PM

## 2020-11-25 ENCOUNTER — OFFICE VISIT (OUTPATIENT)
Dept: FAMILY MEDICINE CLINIC | Age: 55
End: 2020-11-25
Payer: COMMERCIAL

## 2020-11-25 VITALS
OXYGEN SATURATION: 96 % | TEMPERATURE: 97.7 F | RESPIRATION RATE: 18 BRPM | DIASTOLIC BLOOD PRESSURE: 72 MMHG | BODY MASS INDEX: 39.28 KG/M2 | HEIGHT: 66 IN | WEIGHT: 244.4 LBS | HEART RATE: 85 BPM | SYSTOLIC BLOOD PRESSURE: 109 MMHG

## 2020-11-25 DIAGNOSIS — Z00.00 ADULT GENERAL MEDICAL EXAM: Primary | ICD-10-CM

## 2020-11-25 DIAGNOSIS — E66.01 SEVERE OBESITY (HCC): ICD-10-CM

## 2020-11-25 DIAGNOSIS — M25.50 ARTHRALGIA, UNSPECIFIED JOINT: ICD-10-CM

## 2020-11-25 DIAGNOSIS — F33.2 SEVERE EPISODE OF RECURRENT MAJOR DEPRESSIVE DISORDER, WITHOUT PSYCHOTIC FEATURES (HCC): ICD-10-CM

## 2020-11-25 DIAGNOSIS — E55.9 VITAMIN D DEFICIENCY: ICD-10-CM

## 2020-11-25 LAB
25(OH)D3 SERPL-MCNC: 51.2 NG/ML (ref 30–100)
CHOLEST SERPL-MCNC: 210 MG/DL
HDLC SERPL-MCNC: 92 MG/DL
HDLC SERPL: 2.3 {RATIO} (ref 0–5)
LDLC SERPL CALC-MCNC: 104.6 MG/DL (ref 0–100)
LIPID PROFILE,FLP: ABNORMAL
TRIGL SERPL-MCNC: 67 MG/DL (ref ?–150)
VLDLC SERPL CALC-MCNC: 13.4 MG/DL

## 2020-11-25 PROCEDURE — 99396 PREV VISIT EST AGE 40-64: CPT | Performed by: NURSE PRACTITIONER

## 2020-11-25 RX ORDER — DULOXETIN HYDROCHLORIDE 30 MG/1
30 CAPSULE, DELAYED RELEASE ORAL DAILY
Qty: 30 CAP | Refills: 0 | Status: SHIPPED | OUTPATIENT
Start: 2020-11-25 | End: 2020-12-11

## 2020-11-25 NOTE — PROGRESS NOTES
Chief Complaint   Patient presents with    Complete Physical       1. Have you been to the ER, urgent care clinic since your last visit? Hospitalized since your last visit? No    2. Have you seen or consulted any other health care providers outside of the [address] [place] [address] since your last visit? Include any pap smears or colon screening. Yes When: november 23\ Where: [facility]  Reason for visit: pain management    Abuse Screening Questionnaire 11/25/2020   Do you ever feel afraid of your partner? N   Are you in a relationship with someone who physically or mentally threatens you? N   Is it safe for you to go home?  Y       3 most recent PHQ Screens 11/25/2020   Little interest or pleasure in doing things Nearly every day   Feeling down, depressed, irritable, or hopeless Nearly every day   Total Score PHQ 2 6   Trouble falling or staying asleep, or sleeping too much Not at all   Feeling tired or having little energy Nearly every day   Poor appetite, weight loss, or overeating Nearly every day   Feeling bad about yourself - or that you are a failure or have let yourself or your family down Nearly every day   Trouble concentrating on things such as school, work, reading, or watching TV More than half the days   Moving or speaking so slowly that other people could have noticed; or the opposite being so fidgety that others notice More than half the days   Thoughts of being better off dead, or hurting yourself in some way Several days   PHQ 9 Score 20   How difficult have these problems made it for you to do your work, take care of your home and get along with others Extremely difficult

## 2020-11-25 NOTE — PROGRESS NOTES
Subjective:   50 y.o. female for Well Woman Check. Her gyne and breast care is done elsewhere by her Ob-Gyne physician. Patient Active Problem List   Diagnosis Code    Fibromyalgia M79.7    Severe obesity ([facility] E66.01    Depression, major, severe recurrence ([facility] F33.2     Patient Active Problem List    Diagnosis Date Noted    Depression, major, severe recurrence ([facility] 2020    Severe obesity ([facility] 10/23/2020    Fibromyalgia 2020     Current Outpatient Medications   Medication Sig Dispense Refill    DULoxetine (CYMBALTA) 30 mg capsule Take 1 Cap by mouth daily. 30 Cap 0    diclofenac EC (VOLTAREN) 75 mg EC tablet Take 1 Tab by mouth two (2) times a day. 30 Tab 0    multivitamin (ONE A DAY) tablet Take 1 Tab by mouth daily.  fexofenadine-pseudoephedrine (Allegra-D 12 Hour)  mg Tb12 Take 1 Tab by mouth every twelve (12) hours.  budesonide-formoteroL (Symbicort) 160-4.5 mcg/actuation HFAA Take 2 Puffs by inhalation.  zolpidem (AMBIEN) 10 mg tablet Take 1 Tab by mouth nightly as needed for Sleep. Max Daily Amount: 10 mg. 90 Tab 1    montelukast (SINGULAIR) 10 mg tablet Take 1 Tab by mouth daily. 90 Tab 1    fluticasone propionate (Flonase Allergy Relief) 50 mcg/actuation nasal spray 2 Sprays by Both Nostrils route daily. 1 Bottle 3    cyclobenzaprine (FLEXERIL) 5 mg tablet Take 1 Tab by mouth nightly. 90 Tab 1    HYDROcodone-acetaminophen (NORCO) 5-325 mg per tablet Take 2 Tabs by mouth every four (4) hours as needed for Pain.  albuterol (PROVENTIL HFA, VENTOLIN HFA, PROAIR HFA) 90 mcg/actuation inhaler Take 2 Puffs by inhalation every four (4) hours as needed for Wheezing.        Allergies   Allergen Reactions    Prednisone Other (comments)    Amoxicillin Rash    Azithromycin Rash     Past Medical History:   Diagnosis Date    Asthma      Past Surgical History:   Procedure Laterality Date    HX  SECTION      HX CHOLECYSTECTOMY      HX KNEE ARTHROSCOPY  HX RHINOPLASTY       Family History   Problem Relation Age of Onset   Stephen Polo Arthritis-osteo Mother     Hypertension Mother     Stroke Father    Stephen Polo Arthritis-rheumatoid Father     Heart Attack Father      Social History     Tobacco Use    Smoking status: Never Smoker    Smokeless tobacco: Never Used   Substance Use Topics    Alcohol use: Never     Frequency: Never       Specific concerns today: Severe depression, she has lost her job since May and moved in with her daughter. She denies suicidal ideation or hurting anyone else. She has generalized joint pain and if followed by rheumatologist     Review of Systems  A comprehensive review of systems was negative except for that written in the HPI. depression    Objective:   Blood pressure 109/72, pulse 85, temperature 97.7 °F (36.5 °C), temperature source Temporal, resp. rate 18, height 5' 6\" (1.676 m), weight 244 lb 6.4 oz (110.9 kg), SpO2 96 %.    Physical Examination:   General appearance - alert, well appearing, and in no distress  Mental status - alert, oriented to person, place, and time  Eyes - pupils equal and reactive, extraocular eye movements intact  Ears - bilateral TM's and external ear canals normal  Nose - normal and patent, no erythema, discharge or polyps  Mouth - mucous membranes moist, pharynx normal without lesions  Neck - supple, no significant adenopathy  Lymphatics - no palpable lymphadenopathy, no hepatosplenomegaly  Chest - clear to auscultation, no wheezes, rales or rhonchi, symmetric air entry  Heart - normal rate, regular rhythm, normal S1, S2, no murmurs, rubs, clicks or gallops  Abdomen - soft, nontender, nondistended, no masses or organomegaly  Back exam - lower back tenderness  Neurological - alert, oriented, normal speech, no focal findings or movement disorder noted  Musculoskeletal - generalized joint pain   Extremities - peripheral pulses normal, no pedal edema, no clubbing or cyanosis  Skin - normal coloration and turgor, no rashes, no suspicious skin lesions noted     Assessment/Plan:     lose weight, follow low fat diet, follow low salt diet, continue present plan    ICD-10-CM ICD-9-CM    1. Adult general medical exam  Z00.00 V70.9 LIPID PANEL      LIPID PANEL   2. Vitamin D deficiency  E55.9 268.9 VITAMIN D, 25 HYDROXY      VITAMIN D, 25 HYDROXY   3. Severe episode of recurrent major depressive disorder, without psychotic features ([facility])  F33.2 296.33 DULoxetine (CYMBALTA) 30 mg capsule      REFERRAL TO PSYCHIATRY   4. Arthralgia, unspecified joint  M25.50 719.40    5.  Severe obesity ([facility]  E66.01 278.01    Appointment made for he see psychologist   Follow up in 2 weeks

## 2020-11-30 ENCOUNTER — APPOINTMENT (OUTPATIENT)
Dept: PHYSICAL THERAPY | Age: 55
End: 2020-11-30
Payer: MEDICAID

## 2020-12-02 ENCOUNTER — HOSPITAL ENCOUNTER (OUTPATIENT)
Dept: PHYSICAL THERAPY | Age: 55
Discharge: HOME | End: 2020-12-02
Payer: COMMERCIAL

## 2020-12-02 PROCEDURE — 97110 THERAPEUTIC EXERCISES: CPT | Performed by: PHYSICAL THERAPY ASSISTANT

## 2020-12-02 NOTE — PROGRESS NOTES
PT DAILY TREATMENT NOTE 2-15    Patient Name: Matias Kaur  Date:2020  : 1965  [x]  Patient  Verified  Payor: Adolfo Pedraza / Plan: [id number] HMO/CHOICE PLUS/POS / Product Type: HMO /    In time:3:50 PM  Out time:4:45 Pm  Total Treatment Time (min): 55  Visit #:  2    Treatment Area: Neck pain [M54.2]  Left shoulder pain [M25.512]  Right shoulder pain [M25.511]    SUBJECTIVE  Pain Level (0-10 scale): 610  Any medication changes, allergies to medications, adverse drug reactions, diagnosis change, or new procedure performed?: [x] No    [] Yes (see summary sheet for update)  Subjective functional status/changes:   [] No changes reported  Patient reports compliance with HEP. States she had an MRI of her R UE today secondary to neuropathy. States she fell the other day but did not hit her head. She is doing ok today.     OBJECTIVE    Modality rationale: decrease pain and increase tissue extensibility to improve the patients ability to perform ADL's,    Min Type Additional Details       [] Estim: []Att   []Unatt    []TENS instruct                  []IFC  []Premod   []NMES                     []Other:  []w/US   []w/ice   []w/heat  Position:  Location:       []  Traction: [] Cervical       []Lumbar                       [] Prone          []Supine                       []Intermittent   []Continuous Lbs:  [] before manual  [] after manual  []w/heat    []  Ultrasound: []Continuous   [] Pulsed                       at: []1MHz   []3MHz Location:  W/cm2:    [] Paraffin         Location:   []w/heat    []  Ice     []  Heat  []  Ice massage Position:  Location:    []  Laser  []  Other: Position:  Location:      []  Vasopneumatic Device Pressure:       [] lo [] med [] hi   Temperature:      [x] Skin assessment post-treatment:  [x]intact []redness- no adverse reaction    []redness  adverse reaction:         55 min Therapeutic Exercise:  [x] See flow sheet : reviewed HEP, added per flow sheet Rationale: increase ROM, increase strength and improve coordination to improve the patients ability to perform ADL's       min Manual Therapy:     Rationale: decrease pain, increase ROM, increase tissue extensibility and decrease trigger points  to improve the patients ability to perform ADL's      With   [] TE   [] TA   [] neuro   [] other: Patient Education: [x] Review HEP    [] Progressed/Changed HEP based on:   [] positioning   [] body mechanics   [] transfers   [] heat/ice application    [] other:      Other Objective/Functional Measures:  FOTO Outcome Measure: 31 Patients intake functional measure is 31 out of 100 (higher number = greater function). Pain Level (0-10 scale) post treatment: not reported    ASSESSMENT/Changes in Function:   Weakness and tingling present R UE into hand during TB shoulder row and extensions despite modifications. Patient with increased lumbar pain with prolonged supine positioning, may try table exercises in sitting to reduce lumbar pain. Gave patient updated HEP. Patient will continue to benefit from skilled PT services to modify and progress therapeutic interventions, address functional mobility deficits, address ROM deficits, address strength deficits, analyze and address soft tissue restrictions, analyze and cue movement patterns, analyze and modify body mechanics/ergonomics and instruct in home and community integration to attain remaining goals.      []  See Plan of Care  []  See progress note/recertification  []  See Discharge Summary         Progress towards goals / Updated goals:  NT    PLAN  []  Upgrade activities as tolerated     [x]  Continue plan of care  []  Update interventions per flow sheet       []  Discharge due to:_  []  Other:_      Sherly Pak, PTA 12/2/2020

## 2020-12-07 ENCOUNTER — HOSPITAL ENCOUNTER (OUTPATIENT)
Dept: PHYSICAL THERAPY | Age: 55
Discharge: HOME | End: 2020-12-07
Payer: COMMERCIAL

## 2020-12-07 PROCEDURE — 97110 THERAPEUTIC EXERCISES: CPT | Performed by: PHYSICAL THERAPIST

## 2020-12-07 NOTE — PROGRESS NOTES
PT DAILY TREATMENT NOTE 2-15    Patient Name: Porter Martínez  BTUY:  : 1965  [x]  Patient  Verified  Payor: Vidal Randys / Plan: [address] [place] [address] HMO/CHOICE PLUS/POS / Product Type: HMO /    In time: 4:05 PM  Out time: 5:00 Pm  Total Treatment Time (min): 55  Visit #:  3    Treatment Area: Neck pain [M54.2]  Left shoulder pain [M25.512]  Right shoulder pain [M25.511]    SUBJECTIVE  Pain Level (0-10 scale): 7/10  Any medication changes, allergies to medications, adverse drug reactions, diagnosis change, or new procedure performed?: [x] No    [] Yes (see summary sheet for update)  Subjective functional status/changes:   [] No changes reported  Patient states that she is tired, her \"joint, bones\" are hurting in bilat feet. She has been doing a lot with her 10 yo granddaughter today. She is using SPC today. She has been very cautious/moving slower to make sure she does not fall. She feels that her legs are starting to give out on her more recently. She lives on 3rd floor-- goes up/down stairs 3-4x/day  She had MRI on R arm-- awaiting results.     OBJECTIVE    Modality rationale: decrease pain and increase tissue extensibility to improve the patients ability to perform ADL's,    Min Type Additional Details       [] Estim: []Att   []Unatt    []TENS instruct                  []IFC  []Premod   []NMES                     []Other:  []w/US   []w/ice   []w/heat  Position:  Location:       []  Traction: [] Cervical       []Lumbar                       [] Prone          []Supine                       []Intermittent   []Continuous Lbs:  [] before manual  [] after manual  []w/heat    []  Ultrasound: []Continuous   [] Pulsed                       at: []1MHz   []3MHz Location:  W/cm2:    [] Paraffin         Location:   []w/heat    []  Ice     []  Heat  []  Ice massage Position:  Location:    []  Laser  []  Other: Position:  Location:      []  Vasopneumatic Device Pressure:       [] lo [] med [] hi Temperature:      [x] Skin assessment post-treatment:  [x]intact []redness- no adverse reaction    []redness  adverse reaction:         55 min Therapeutic Exercise:  [x] See flow sheet : reviewed HEP, added per flow sheet   Rationale: increase ROM, increase strength and improve coordination to improve the patients ability to perform ADL's       min Manual Therapy:     Rationale: decrease pain, increase ROM, increase tissue extensibility and decrease trigger points  to improve the patients ability to perform ADL's      With   [] TE   [] TA   [] neuro   [] other: Patient Education: [x] Review HEP    [] Progressed/Changed HEP based on:   [] positioning   [] body mechanics   [] transfers   [] heat/ice application    [] other:      Other Objective/Functional Measures:   n/a    Pain Level (0-10 scale) post treatment: 8/10 \"more irritated because we've been working the nerves and stuff\"    ASSESSMENT/Changes in Function:   R knee buckled while pt performing standing TB rows, ext; pt able to regain balance without assistance. Pt not able to complete full reps of some exercises (see flow sheet) due to inc'd symptoms    Patient will continue to benefit from skilled PT services to modify and progress therapeutic interventions, address functional mobility deficits, address ROM deficits, address strength deficits, analyze and address soft tissue restrictions, analyze and cue movement patterns, analyze and modify body mechanics/ergonomics and instruct in home and community integration to attain remaining goals.      [x]  See Plan of Care  []  See progress note/recertification  []  See Discharge Summary         Progress towards goals / Updated goals:  NT    PLAN  []  Upgrade activities as tolerated     [x]  Continue plan of care  []  Update interventions per flow sheet       []  Discharge due to:_  []  Other:_      Afsaneh Ahn, PT 12/7/2020

## 2020-12-08 ENCOUNTER — VIRTUAL VISIT (OUTPATIENT)
Dept: FAMILY MEDICINE CLINIC | Age: 55
End: 2020-12-08
Payer: COMMERCIAL

## 2020-12-08 DIAGNOSIS — F33.1 MODERATE EPISODE OF RECURRENT MAJOR DEPRESSIVE DISORDER (HCC): Primary | ICD-10-CM

## 2020-12-08 PROCEDURE — 90791 PSYCH DIAGNOSTIC EVALUATION: CPT | Performed by: SOCIAL WORKER

## 2020-12-08 NOTE — PROGRESS NOTES
This note will not be viewable in [facility] for the following reason(s). This is a Psychotherapy Note. ([address], [facility] [address] Providers Only)   Virtual AT HOME Initial Appt:  Met with Ms. Bo Rdz today for the first time. She has multiple health issues that cause daily pain. As a result, Ms. Bo Rdz stopped working about 7 months ago and has applied for disability. She was denied for disability and is now in the process of appealing this decision. Ms. Bo Rdz is feeling dependant, depressed and worried about her finances. She is currently living with her daughter and granddaughter. Additionally, ms. Cardoso reports she has lost many loved ones over the past several years. She lost her mother, whom she was caring for over the last 10 years in April 2020. She is still grieving this loss. She was tearful every time she talked about her mother. She has lost her father to a heart attack when he was in his 52's, her brother, age 29 to HIV and her fiance to an Overdose. She feels alone and dependant on her daughter. Ms. Bo Rdz was a productive and independent person who worked for the past 35 years and is now unable to work and not receiving any monies. Ms. Bo Rdz was born in [place] [address] but grew up in [place]. Her father was  in a company and her mother was an  for [employer]. Ms. 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 \"  Ms. Bo Rdz has a 27year old daughter and a 9year old (with austism) granddaughter. She assists with her care. Ms. Bo Rdz is a 50year old female that was alert and oriented X3. She is tearful during the interview, admitting to feeling overwhelmed, frustrated, angry and \"helpless at times. \"  She denies any acute symptoms. She is not reporting any suicidal or homicidal ideations.   She is not psychotic or delusional.  She is average intelligence with good insight and judgement. She is a good candidate for short term bereavement counseling. We have scheduled a follow up on Yuan. 6 @ 10.   Franko Winn LCSW

## 2020-12-09 ENCOUNTER — APPOINTMENT (OUTPATIENT)
Dept: PHYSICAL THERAPY | Age: 55
End: 2020-12-09
Payer: COMMERCIAL

## 2020-12-11 ENCOUNTER — OFFICE VISIT (OUTPATIENT)
Dept: FAMILY MEDICINE CLINIC | Age: 55
End: 2020-12-11
Payer: COMMERCIAL

## 2020-12-11 VITALS
WEIGHT: 244.2 LBS | SYSTOLIC BLOOD PRESSURE: 120 MMHG | OXYGEN SATURATION: 100 % | TEMPERATURE: 98.2 F | DIASTOLIC BLOOD PRESSURE: 80 MMHG | RESPIRATION RATE: 16 BRPM | BODY MASS INDEX: 39.25 KG/M2 | HEIGHT: 66 IN | HEART RATE: 94 BPM

## 2020-12-11 DIAGNOSIS — R00.2 HEART PALPITATIONS: ICD-10-CM

## 2020-12-11 DIAGNOSIS — F32.89 OTHER DEPRESSION: Primary | ICD-10-CM

## 2020-12-11 PROCEDURE — 99213 OFFICE O/P EST LOW 20 MIN: CPT | Performed by: NURSE PRACTITIONER

## 2020-12-11 RX ORDER — DULOXETIN HYDROCHLORIDE 60 MG/1
60 CAPSULE, DELAYED RELEASE ORAL DAILY
Qty: 90 CAP | Refills: 1 | Status: SHIPPED | OUTPATIENT
Start: 2020-12-11 | End: 2021-06-08

## 2020-12-11 RX ORDER — METOPROLOL SUCCINATE 25 MG/1
TABLET, EXTENDED RELEASE ORAL
Qty: 45 TAB | Refills: 2 | Status: SHIPPED | OUTPATIENT
Start: 2020-12-11 | End: 2021-02-17

## 2020-12-11 NOTE — PROGRESS NOTES
Chief Complaint   Patient presents with    Other     follow up       1. Have you been to the ER, urgent care clinic since your last visit? Hospitalized since your last visit? No    2. Have you seen or consulted any other health care providers outside of the [address] [place] [address] since your last visit? Include any pap smears or colon screening.  No

## 2020-12-11 NOTE — PROGRESS NOTES
[facility] Note  Subjective:      Maria Del Rosario Alcantara is a 50 y.o. female who presents for follow up on depression, started her on Cymbalta 30 mg, reports that medication is helping her, but she can use a higher dose. Her heart rate is slightly elevated, will try low dose of [place] XL    Past Medical History:   Diagnosis Date    Asthma      Past Surgical History:   Procedure Laterality Date    HX  SECTION      HX CHOLECYSTECTOMY      HX KNEE ARTHROSCOPY      HX RHINOPLASTY         Current Outpatient Medications   Medication Sig Dispense Refill    DULoxetine (CYMBALTA) 60 mg capsule Take 1 Cap by mouth daily. 90 Cap 1    metoprolol succinate (TOPROL-XL) 25 mg XL tablet Take 1/2 tab at bed time 45 Tab 2    diclofenac EC (VOLTAREN) 75 mg EC tablet Take 1 Tab by mouth two (2) times a day. 30 Tab 0    multivitamin (ONE A DAY) tablet Take 1 Tab by mouth daily.  fexofenadine-pseudoephedrine (Allegra-D 12 Hour)  mg Tb12 Take 1 Tab by mouth every twelve (12) hours.  budesonide-formoteroL (Symbicort) 160-4.5 mcg/actuation HFAA Take 2 Puffs by inhalation.  zolpidem (AMBIEN) 10 mg tablet Take 1 Tab by mouth nightly as needed for Sleep. Max Daily Amount: 10 mg. 90 Tab 1    montelukast (SINGULAIR) 10 mg tablet Take 1 Tab by mouth daily. 90 Tab 1    fluticasone propionate (Flonase Allergy Relief) 50 mcg/actuation nasal spray 2 Sprays by Both Nostrils route daily. 1 Bottle 3    cyclobenzaprine (FLEXERIL) 5 mg tablet Take 1 Tab by mouth nightly. 90 Tab 1    HYDROcodone-acetaminophen (NORCO) 5-325 mg per tablet Take 2 Tabs by mouth every four (4) hours as needed for Pain.  albuterol (PROVENTIL HFA, VENTOLIN HFA, PROAIR HFA) 90 mcg/actuation inhaler Take 2 Puffs by inhalation every four (4) hours as needed for Wheezing.        Allergies   Allergen Reactions    Prednisone Other (comments)    Amoxicillin Rash    Azithromycin Rash       ROS:   Complete review of systems was reviewed with pertinent information listed in HPI. Review of Systems   Constitutional: Negative. HENT: Negative. Gastrointestinal: Negative. Psychiatric/Behavioral: Positive for depression. Objective:     Visit Vitals  /80 (BP 1 Location: Left arm, BP Patient Position: Sitting)   Pulse 94   Temp 98.2 °F (36.8 °C) (Temporal)   Resp 16   Ht 5' 6\" (1.676 m)   Wt 244 lb 3.2 oz (110.8 kg)   SpO2 100%   BMI 39.41 kg/m²       Vitals and Nurse Documentation reviewed. Physical Exam  Constitutional:       Appearance: Normal appearance. She is obese. HENT:      Mouth/Throat:      Mouth: Mucous membranes are moist.   Neck:      Musculoskeletal: Normal range of motion and neck supple. Cardiovascular:      Pulses: Normal pulses. Heart sounds: Normal heart sounds. No murmur. Pulmonary:      Effort: Pulmonary effort is normal.      Breath sounds: Normal breath sounds. Abdominal:      General: Bowel sounds are normal.      Palpations: Abdomen is soft. Neurological:      Mental Status: She is alert. Psychiatric:         Mood and Affect: Mood normal.         Thought Content: Thought content normal.         Assessment/Plan:     Diagnoses and all orders for this visit:    1. Other depression  -     DULoxetine (CYMBALTA) 60 mg capsule; Take 1 Cap by mouth daily. 2. Heart palpitations  -     metoprolol succinate (TOPROL-XL) 25 mg XL tablet;  Take 1/2 tab at bed time    follow up as needed      Pt expressed understanding with the diagnosis and plan        Discussed expected course/resolution/complications of diagnosis in detail with patient.    Medication risks/benefits/costs/interactions/alternatives discussed with patient.    Pt was given an after visit summary which includes diagnoses, current medications & vitals.  Pt expressed understanding with the diagnosis and plan

## 2020-12-14 ENCOUNTER — APPOINTMENT (OUTPATIENT)
Dept: PHYSICAL THERAPY | Age: 55
End: 2020-12-14
Payer: COMMERCIAL

## 2020-12-16 ENCOUNTER — HOSPITAL ENCOUNTER (OUTPATIENT)
Dept: PHYSICAL THERAPY | Age: 55
Discharge: HOME | End: 2020-12-16
Payer: COMMERCIAL

## 2020-12-16 PROCEDURE — 97110 THERAPEUTIC EXERCISES: CPT | Performed by: PHYSICAL THERAPIST

## 2020-12-16 NOTE — ANCILLARY DISCHARGE INSTRUCTIONS
[facility] [address] [place], [address] Phone: [phone]  Fax: [phone] Discharge Summary  2-15 Patient name: Bernard Vale  : 1965  Provider#:[id number] Referral source: Wing Remy NP Medical/Treatment Diagnosis: Neck pain [M54.2] Left shoulder pain [M25.512] Right shoulder pain [M25.511] Prior Hospitalization: see medical history Comorbidities: see evaluation Prior Level of Function:see evaluation Medications: Verified on Patient Summary List 
 
Start of Care: 2020      Onset Date:see evaluation Visits from Start of Care: 4     Missed Visits: see CC Reporting Period : 2020 to 2020 Summary of care:  
Pt requesting self discharge from PT due to personal reasons. Independent in HEP 
 
 
RECOMMENDATIONS: 
[x]Discontinue therapy: []Patient has reached or is progressing toward set goals [x]Patient  has self discharged 
    []Due to lack of appreciable progress towards set goals Neyda Dong, PT 2020 5:06 PM

## 2020-12-16 NOTE — PROGRESS NOTES
PT DAILY TREATMENT NOTE 2-15    Patient Name: Alexi Deutsch  LYPL:  : 1965  [x]  Patient  Verified  Payor: Titi Verdin / Plan: [id number] HMO/CHOICE PLUS/POS / Product Type: HMO /    In time: 4:10 PM  Out time: 4:40 Pm  Total Treatment Time (min): 30  Visit #:  4    Treatment Area: Neck pain [M54.2]  Left shoulder pain [M25.512]  Right shoulder pain [M25.511]    SUBJECTIVE  Pain Level (0-10 scale): 6/10  Any medication changes, allergies to medications, adverse drug reactions, diagnosis change, or new procedure performed?: [x] No    [] Yes (see summary sheet for update)  Subjective functional status/changes:   [] No changes reported  Patient states that she would like today to be her last visit, as she has a lot on her plate right now and is starting to feel overwhelmed, \"I feel depression coming on\"-- she has a therapist that she meet with regularly about her depression issues. She ran into a table on Monday and had inc'd lower back pain.  \"doing okay\" today    OBJECTIVE    Modality rationale: decrease pain and increase tissue extensibility to improve the patients ability to perform ADL's,    Min Type Additional Details       [] Estim: []Att   []Unatt    []TENS instruct                  []IFC  []Premod   []NMES                     []Other:  []w/US   []w/ice   []w/heat  Position:  Location:       []  Traction: [] Cervical       []Lumbar                       [] Prone          []Supine                       []Intermittent   []Continuous Lbs:  [] before manual  [] after manual  []w/heat    []  Ultrasound: []Continuous   [] Pulsed                       at: []1MHz   []3MHz Location:  W/cm2:    [] Paraffin         Location:   []w/heat    []  Ice     []  Heat  []  Ice massage Position:  Location:    []  Laser  []  Other: Position:  Location:      []  Vasopneumatic Device Pressure:       [] lo [] med [] hi   Temperature:      [x] Skin assessment post-treatment:  [x]intact []redness- no adverse reaction    []redness  adverse reaction:         30 min Therapeutic Exercise:  [x] See flow sheet : reviewed HEP   Rationale: increase ROM, increase strength and improve coordination to improve the patients ability to perform ADL's      With   [] TE   [] TA   [] neuro   [] other: Patient Education: [x] Review HEP    [] Progressed/Changed HEP based on:   [] positioning   [] body mechanics   [] transfers   [] heat/ice application    [] other:      Other Objective/Functional Measures:   n/a    Pain Level (0-10 scale) post treatment: not reported \"I can feel it in my back and shoulders\"    ASSESSMENT/Changes in Function:   Pt given appropriate tbands, printed HEP today. Reviewed exercises. Pt self discharged from PT due to personal reasons (see objective). []  See Plan of Care  []  See progress note/recertification  [x]  See Discharge Summary         Progress towards goals / Updated goals:  Short Term Goals: To be accomplished in 2-3 weeks:  1) Pt will be independent in initial HEP MET  2) Pt will report >/=25% improvement in symptoms progressing  3) Pt will report compliance with heat regimen MET     Long Term Goals:  To be accomplished in 4-6 weeks: progressing  1) Pt will improve bilat cervical AROM rot to >/=45 deg in order to assist in daily chores  2) Pt will report being able to stand for >/=10 min in order to assist in doing dishes, cleaning  3) Pt will report >/=50% improvement in symptoms  4) Pt will improve bilat shoulder AROM flex to >/=120 deg in order to assist in reaching into cabinets     PLAN   [x]  Discharge due to:_ self discharge from [address] [place] [address], PT 12/16/2020

## 2020-12-21 ENCOUNTER — APPOINTMENT (OUTPATIENT)
Dept: PHYSICAL THERAPY | Age: 55
End: 2020-12-21
Payer: COMMERCIAL

## 2020-12-23 ENCOUNTER — APPOINTMENT (OUTPATIENT)
Dept: PHYSICAL THERAPY | Age: 55
End: 2020-12-23
Payer: COMMERCIAL

## 2020-12-23 ENCOUNTER — TELEPHONE (OUTPATIENT)
Dept: FAMILY MEDICINE CLINIC | Age: 55
End: 2020-12-23

## 2020-12-23 NOTE — TELEPHONE ENCOUNTER
----- Message from Bon Hawkins sent at 12/23/2020 12:56 PM EST -----  Regarding: VIOLETA Mendez/ telephone  General Message/Vendor Calls    Caller's first and last name: N/A      Reason for call: Pt has not been able to sleep for 1.5 weeks due to her med. Pt would like to discuss a poss sleeping med.       Callback required yes/no and why: yes      Best contact number(s):[phone]      Details to clarify the request: N/A      Bon Hawkins

## 2020-12-28 ENCOUNTER — APPOINTMENT (OUTPATIENT)
Dept: PHYSICAL THERAPY | Age: 55
End: 2020-12-28
Payer: COMMERCIAL

## 2020-12-30 ENCOUNTER — APPOINTMENT (OUTPATIENT)
Dept: PHYSICAL THERAPY | Age: 55
End: 2020-12-30
Payer: COMMERCIAL

## 2021-01-06 ENCOUNTER — VIRTUAL VISIT (OUTPATIENT)
Dept: FAMILY MEDICINE CLINIC | Age: 56
End: 2021-01-06
Payer: COMMERCIAL

## 2021-01-06 DIAGNOSIS — F32.89 OTHER DEPRESSION: Primary | ICD-10-CM

## 2021-01-06 PROCEDURE — 90837 PSYTX W PT 60 MINUTES: CPT | Performed by: SOCIAL WORKER

## 2021-01-06 NOTE — PROGRESS NOTES
This note will not be viewable in [employer] for the following reason(s). This is a Psychotherapy Note. ([address], [facility] Providers Only)   Virtual AT HOME follow up appt:  Met with Ms. Vannessa Reilly today. She reports she has been feeling much better since being put on Celexa. She is feeling less irritable, less tearful and overall her moods have been more stable. Ms. Vannessa Reilly has been trying to schedule a psychiatrist appt but having problems finding one. She will continue to pursue one in the community. She states she likes talking to \"someone outside of my family\" to address her grief and loss of her mother and other family that have passed away. Ms. Vannessa Reilly is glad that her granddaughter will be returning to school at the end of the month. [facility] are reopening for the kids to come back physically to the school on Jan. 25.  Ms. Vannessa Reilly feels this will help her feel less stressed as she will have a few hours home alone. We have scheduled a follow up on Jan. 25 @ 10.   Tana soto LCSW

## 2021-01-14 DIAGNOSIS — M25.50 ARTHRALGIA, UNSPECIFIED JOINT: ICD-10-CM

## 2021-01-15 RX ORDER — DICLOFENAC SODIUM 75 MG/1
TABLET, DELAYED RELEASE ORAL
Qty: 30 TAB | Refills: 0 | Status: SHIPPED | OUTPATIENT
Start: 2021-01-15 | End: 2021-04-02

## 2021-02-17 ENCOUNTER — OFFICE VISIT (OUTPATIENT)
Dept: FAMILY MEDICINE CLINIC | Age: 56
End: 2021-02-17
Payer: COMMERCIAL

## 2021-02-17 VITALS
HEART RATE: 98 BPM | TEMPERATURE: 98.1 F | DIASTOLIC BLOOD PRESSURE: 85 MMHG | RESPIRATION RATE: 18 BRPM | OXYGEN SATURATION: 99 % | SYSTOLIC BLOOD PRESSURE: 128 MMHG | BODY MASS INDEX: 39.41 KG/M2 | HEIGHT: 66 IN

## 2021-02-17 DIAGNOSIS — R00.2 HEART PALPITATIONS: ICD-10-CM

## 2021-02-17 DIAGNOSIS — M25.562 CHRONIC PAIN OF LEFT KNEE: ICD-10-CM

## 2021-02-17 DIAGNOSIS — Z12.31 SCREENING MAMMOGRAM, ENCOUNTER FOR: ICD-10-CM

## 2021-02-17 DIAGNOSIS — G89.29 CHRONIC PAIN OF LEFT KNEE: ICD-10-CM

## 2021-02-17 DIAGNOSIS — E66.01 SEVERE OBESITY (HCC): ICD-10-CM

## 2021-02-17 DIAGNOSIS — G47.00 INSOMNIA, UNSPECIFIED TYPE: ICD-10-CM

## 2021-02-17 DIAGNOSIS — F33.1 MODERATE EPISODE OF RECURRENT MAJOR DEPRESSIVE DISORDER (HCC): Primary | ICD-10-CM

## 2021-02-17 PROCEDURE — 99214 OFFICE O/P EST MOD 30 MIN: CPT | Performed by: NURSE PRACTITIONER

## 2021-02-17 RX ORDER — METOPROLOL SUCCINATE 50 MG/1
50 TABLET, EXTENDED RELEASE ORAL DAILY
Qty: 90 TAB | Refills: 1 | Status: SHIPPED | OUTPATIENT
Start: 2021-02-17 | End: 2021-06-08

## 2021-02-17 NOTE — PROGRESS NOTES
[facility] Note  Subjective:      Casey Diaz is a 50 y.o. female who presents for form completion for DMV, she has history of depression, insomnia, obesity,chronic left knee pain and asthma. Taking medication as prescribed. Her heart rate is elevated, will increase Toprol to 50 mg to take at night. She is currently followed by Tana/therapist  for depression and states that taking with her is very helpful. Past Medical History:   Diagnosis Date    Asthma      Past Surgical History:   Procedure Laterality Date    HX  SECTION      HX CHOLECYSTECTOMY      HX KNEE ARTHROSCOPY      HX RHINOPLASTY         Current Outpatient Medications   Medication Sig Dispense Refill    metoprolol succinate (TOPROL-XL) 50 mg XL tablet Take 1 Tab by mouth daily. 90 Tab 1    diclofenac EC (VOLTAREN) 75 mg EC tablet TAKE ONE TABLET BY MOUTH TWICE A DAY 30 Tab 0    DULoxetine (CYMBALTA) 60 mg capsule Take 1 Cap by mouth daily. 90 Cap 1    multivitamin (ONE A DAY) tablet Take 1 Tab by mouth daily.  fexofenadine-pseudoephedrine (Allegra-D 12 Hour)  mg Tb12 Take 1 Tab by mouth every twelve (12) hours.  budesonide-formoteroL (Symbicort) 160-4.5 mcg/actuation HFAA Take 2 Puffs by inhalation.  zolpidem (AMBIEN) 10 mg tablet Take 1 Tab by mouth nightly as needed for Sleep. Max Daily Amount: 10 mg. 90 Tab 1    montelukast (SINGULAIR) 10 mg tablet Take 1 Tab by mouth daily. 90 Tab 1    fluticasone propionate (Flonase Allergy Relief) 50 mcg/actuation nasal spray 2 Sprays by Both Nostrils route daily. 1 Bottle 3    cyclobenzaprine (FLEXERIL) 5 mg tablet Take 1 Tab by mouth nightly. 90 Tab 1    HYDROcodone-acetaminophen (NORCO) 5-325 mg per tablet Take 2 Tabs by mouth every four (4) hours as needed for Pain.  albuterol (PROVENTIL HFA, VENTOLIN HFA, PROAIR HFA) 90 mcg/actuation inhaler Take 2 Puffs by inhalation every four (4) hours as needed for Wheezing. Allergies   Allergen Reactions    Prednisone Other (comments)    Amoxicillin Rash    Azithromycin Rash       ROS:   Complete review of systems was reviewed with pertinent information listed in HPI. Review of Systems   Constitutional: Negative. Respiratory: Negative. Cardiovascular: Negative. Gastrointestinal: Negative. Musculoskeletal: Positive for joint pain. Psychiatric/Behavioral: Positive for depression. All other systems reviewed and are negative. Objective:     Visit Vitals  /85 (BP 1 Location: Right arm, BP Patient Position: Sitting, BP Cuff Size: Large adult)   Pulse 98   Temp 98.1 °F (36.7 °C) (Temporal)   Resp 18   Ht 5' 6\" (1.676 m)   SpO2 99%   BMI 39.41 kg/m²       Vitals and Nurse Documentation reviewed. Physical Exam  Constitutional:       Appearance: Normal appearance. She is obese. HENT:      Mouth/Throat:      Mouth: Mucous membranes are moist.   Neck:      Musculoskeletal: Normal range of motion and neck supple. Cardiovascular:      Rate and Rhythm: Normal rate and regular rhythm. Pulses: Normal pulses. Heart sounds: Normal heart sounds. No murmur. Pulmonary:      Effort: Pulmonary effort is normal.      Breath sounds: Normal breath sounds. Abdominal:      General: Bowel sounds are normal.      Palpations: Abdomen is soft. Musculoskeletal:         General: Tenderness present. Comments: Left knee tenderness, increased with walking   Neurological:      Mental Status: She is alert. Psychiatric:         Mood and Affect: Mood normal.         Thought Content: Thought content normal.         Assessment/Plan:     Diagnoses and all orders for this visit:    1. Moderate episode of recurrent major depressive disorder (HCC)    2. Screening mammogram, encounter for  -     [facility] MAMMO BI SCREENING INCL CAD; Future    3. Insomnia, unspecified type    4. Severe obesity ([address]    5. Chronic pain of left knee    6.  Heart palpitations  -     metoprolol succinate (TOPROL-XL) 50 mg XL tablet; Take 1 Tab by mouth daily.           Pt expressed understanding with the diagnosis and plan        Discussed expected course/resolution/complications of diagnosis in detail with patient.    Medication risks/benefits/costs/interactions/alternatives discussed with patient.    Pt was given an after visit summary which includes diagnoses, current medications & vitals.  Pt expressed understanding with the diagnosis and plan

## 2021-02-17 NOTE — PROGRESS NOTES
Chief Complaint   Patient presents with    Form Completion     DMV forms    Medication Refill     all meds       1. Have you been to the ER, urgent care clinic since your last visit? Hospitalized since your last visit? No    2. Have you seen or consulted any other health care providers outside of the [address] [place] [address] since your last visit? Include any pap smears or colon screening.  No    3 most recent PHQ Screens 11/25/2020   Little interest or pleasure in doing things Nearly every day   Feeling down, depressed, irritable, or hopeless Nearly every day   Total Score PHQ 2 6   Trouble falling or staying asleep, or sleeping too much Not at all   Feeling tired or having little energy Nearly every day   Poor appetite, weight loss, or overeating Nearly every day   Feeling bad about yourself - or that you are a failure or have let yourself or your family down Nearly every day   Trouble concentrating on things such as school, work, reading, or watching TV More than half the days   Moving or speaking so slowly that other people could have noticed; or the opposite being so fidgety that others notice More than half the days   Thoughts of being better off dead, or hurting yourself in some way Several days   PHQ 9 Score 20   How difficult have these problems made it for you to do your work, take care of your home and get along with others Extremely difficult

## 2021-02-25 DIAGNOSIS — F51.01 PRIMARY INSOMNIA: ICD-10-CM

## 2021-02-26 RX ORDER — MONTELUKAST SODIUM 10 MG/1
TABLET ORAL
Qty: 90 TAB | Refills: 0 | Status: SHIPPED | OUTPATIENT
Start: 2021-02-26 | End: 2021-03-10

## 2021-02-26 RX ORDER — FLUTICASONE PROPIONATE 50 MCG
SPRAY, SUSPENSION (ML) NASAL
Qty: 1 BOTTLE | Refills: 2 | Status: SHIPPED | OUTPATIENT
Start: 2021-02-26 | End: 2021-03-08

## 2021-02-26 RX ORDER — CYCLOBENZAPRINE HCL 5 MG
TABLET ORAL
Qty: 90 TAB | Refills: 0 | Status: SHIPPED | OUTPATIENT
Start: 2021-02-26 | End: 2021-06-09

## 2021-02-26 RX ORDER — ZOLPIDEM TARTRATE 10 MG/1
TABLET ORAL
Qty: 90 TAB | Refills: 0 | Status: SHIPPED | OUTPATIENT
Start: 2021-02-26 | End: 2021-05-28

## 2021-03-09 RX ORDER — FLUTICASONE PROPIONATE 50 MCG
SPRAY, SUSPENSION (ML) NASAL
Qty: 1 BOTTLE | Refills: 5 | Status: SHIPPED | OUTPATIENT
Start: 2021-03-09 | End: 2021-12-14

## 2021-03-10 RX ORDER — MONTELUKAST SODIUM 10 MG/1
TABLET ORAL
Qty: 90 TAB | Refills: 0 | Status: SHIPPED | OUTPATIENT
Start: 2021-03-10 | End: 2021-08-23

## 2021-03-31 DIAGNOSIS — M25.50 ARTHRALGIA, UNSPECIFIED JOINT: ICD-10-CM

## 2021-04-02 RX ORDER — DICLOFENAC SODIUM 75 MG/1
TABLET, DELAYED RELEASE ORAL
Qty: 30 TAB | Refills: 0 | Status: SHIPPED | OUTPATIENT
Start: 2021-04-02 | End: 2021-06-01

## 2021-05-27 DIAGNOSIS — F51.01 PRIMARY INSOMNIA: ICD-10-CM

## 2021-05-28 RX ORDER — ZOLPIDEM TARTRATE 10 MG/1
TABLET ORAL
Qty: 90 TABLET | Refills: 0 | Status: SHIPPED | OUTPATIENT
Start: 2021-05-28 | End: 2021-08-25

## 2021-05-30 DIAGNOSIS — M25.50 ARTHRALGIA, UNSPECIFIED JOINT: ICD-10-CM

## 2021-06-01 RX ORDER — DICLOFENAC SODIUM 75 MG/1
TABLET, DELAYED RELEASE ORAL
Qty: 30 TABLET | Refills: 0 | Status: SHIPPED | OUTPATIENT
Start: 2021-06-01 | End: 2021-06-22

## 2021-06-08 DIAGNOSIS — R00.2 HEART PALPITATIONS: ICD-10-CM

## 2021-06-08 DIAGNOSIS — F32.89 OTHER DEPRESSION: ICD-10-CM

## 2021-06-08 RX ORDER — METOPROLOL SUCCINATE 50 MG/1
50 TABLET, EXTENDED RELEASE ORAL DAILY
Qty: 90 TABLET | Refills: 0 | Status: SHIPPED | OUTPATIENT
Start: 2021-06-08 | End: 2021-11-28

## 2021-06-08 RX ORDER — METOPROLOL SUCCINATE 50 MG/1
TABLET, EXTENDED RELEASE ORAL
Qty: 19 TABLET | Refills: 0 | Status: SHIPPED | OUTPATIENT
Start: 2021-06-08 | End: 2021-06-08 | Stop reason: SDUPTHER

## 2021-06-08 RX ORDER — DULOXETIN HYDROCHLORIDE 60 MG/1
CAPSULE, DELAYED RELEASE ORAL
Qty: 90 CAPSULE | Refills: 0 | Status: SHIPPED | OUTPATIENT
Start: 2021-06-08 | End: 2021-09-06

## 2021-06-08 NOTE — TELEPHONE ENCOUNTER
NP [address],            [employer] would like to know if they can dispense a quantity of 90 in place of the 19 that was prescribed. Please review and prescribe if appropriate. Thank you. Requested Prescriptions     Pending Prescriptions Disp Refills    metoprolol succinate (TOPROL-XL) 50 mg XL tablet 90 Tablet 0     Sig: Take 1 Tablet by mouth daily.

## 2021-06-09 RX ORDER — CYCLOBENZAPRINE HCL 5 MG
TABLET ORAL
Qty: 90 TABLET | Refills: 0 | Status: SHIPPED | OUTPATIENT
Start: 2021-06-09 | End: 2021-09-08

## 2021-06-21 DIAGNOSIS — M25.50 ARTHRALGIA, UNSPECIFIED JOINT: ICD-10-CM

## 2021-06-22 RX ORDER — DICLOFENAC SODIUM 75 MG/1
TABLET, DELAYED RELEASE ORAL
Qty: 30 TABLET | Refills: 0 | Status: SHIPPED | OUTPATIENT
Start: 2021-06-22 | End: 2021-07-19

## 2021-07-17 DIAGNOSIS — M25.50 ARTHRALGIA, UNSPECIFIED JOINT: ICD-10-CM

## 2021-07-19 NOTE — TELEPHONE ENCOUNTER
Last visit 02/17/2021 NP [address]   Next appointment Nothing scheduled   Previous refill encounter(s)   06/22/2021 Voltaren #30     Requested Prescriptions     Pending Prescriptions Disp Refills    diclofenac EC (VOLTAREN) 75 mg EC tablet [Pharmacy Med Name: DICLOFENAC SOD DR 75 MG TAB] 30 Tablet 0     Sig: Take 1 Tablet by mouth two (2) times a day.

## 2021-07-20 RX ORDER — DICLOFENAC SODIUM 75 MG/1
75 TABLET, DELAYED RELEASE ORAL 2 TIMES DAILY
Qty: 30 TABLET | Refills: 0 | Status: SHIPPED | OUTPATIENT
Start: 2021-07-20 | End: 2021-08-11

## 2021-07-30 ENCOUNTER — OFFICE VISIT (OUTPATIENT)
Dept: FAMILY MEDICINE CLINIC | Age: 56
End: 2021-07-30
Payer: COMMERCIAL

## 2021-07-30 VITALS
DIASTOLIC BLOOD PRESSURE: 78 MMHG | OXYGEN SATURATION: 97 % | BODY MASS INDEX: 44.26 KG/M2 | HEIGHT: 66 IN | TEMPERATURE: 97.2 F | WEIGHT: 275.4 LBS | SYSTOLIC BLOOD PRESSURE: 115 MMHG | HEART RATE: 83 BPM | RESPIRATION RATE: 18 BRPM

## 2021-07-30 DIAGNOSIS — R00.2 HEART PALPITATIONS: Primary | ICD-10-CM

## 2021-07-30 DIAGNOSIS — G47.00 INSOMNIA, UNSPECIFIED TYPE: ICD-10-CM

## 2021-07-30 DIAGNOSIS — Z12.31 VISIT FOR SCREENING MAMMOGRAM: ICD-10-CM

## 2021-07-30 DIAGNOSIS — M25.50 ARTHRALGIA, UNSPECIFIED JOINT: ICD-10-CM

## 2021-07-30 DIAGNOSIS — F32.89 OTHER DEPRESSION: ICD-10-CM

## 2021-07-30 DIAGNOSIS — E66.01 SEVERE OBESITY (HCC): ICD-10-CM

## 2021-07-30 DIAGNOSIS — Z12.11 ENCOUNTER FOR SCREENING COLONOSCOPY: ICD-10-CM

## 2021-07-30 PROBLEM — G56.10 MEDIAN NERVE NEUROPATHY: Status: ACTIVE | Noted: 2021-07-30

## 2021-07-30 LAB
ALBUMIN SERPL-MCNC: 4 G/DL (ref 3.5–5)
ALBUMIN/GLOB SERPL: 1.1 {RATIO} (ref 1.1–2.2)
ALP SERPL-CCNC: 91 U/L (ref 45–117)
ALT SERPL-CCNC: 23 U/L (ref 12–78)
ANION GAP SERPL CALC-SCNC: 3 MMOL/L (ref 5–15)
AST SERPL-CCNC: 22 U/L (ref 15–37)
BILIRUB SERPL-MCNC: 0.6 MG/DL (ref 0.2–1)
BUN SERPL-MCNC: 13 MG/DL (ref 6–20)
BUN/CREAT SERPL: 25 (ref 12–20)
CALCIUM SERPL-MCNC: 9 MG/DL (ref 8.5–10.1)
CHLORIDE SERPL-SCNC: 105 MMOL/L (ref 97–108)
CHOLEST SERPL-MCNC: 229 MG/DL
CO2 SERPL-SCNC: 30 MMOL/L (ref 21–32)
CREAT SERPL-MCNC: 0.53 MG/DL (ref 0.55–1.02)
ERYTHROCYTE [DISTWIDTH] IN BLOOD BY AUTOMATED COUNT: 12.4 % (ref 11.5–14.5)
GLOBULIN SER CALC-MCNC: 3.7 G/DL (ref 2–4)
GLUCOSE SERPL-MCNC: 99 MG/DL (ref 65–100)
HCT VFR BLD AUTO: 37.3 % (ref 35–47)
HDLC SERPL-MCNC: 108 MG/DL
HDLC SERPL: 2.1 {RATIO} (ref 0–5)
HGB BLD-MCNC: 11.3 G/DL (ref 11.5–16)
LDLC SERPL CALC-MCNC: 106.2 MG/DL (ref 0–100)
MCH RBC QN AUTO: 28.2 PG (ref 26–34)
MCHC RBC AUTO-ENTMCNC: 30.3 G/DL (ref 30–36.5)
MCV RBC AUTO: 93 FL (ref 80–99)
NRBC # BLD: 0 K/UL (ref 0–0.01)
NRBC BLD-RTO: 0 PER 100 WBC
PLATELET # BLD AUTO: 318 K/UL (ref 150–400)
PMV BLD AUTO: 8.4 FL (ref 8.9–12.9)
POTASSIUM SERPL-SCNC: 4.8 MMOL/L (ref 3.5–5.1)
PROT SERPL-MCNC: 7.7 G/DL (ref 6.4–8.2)
RBC # BLD AUTO: 4.01 M/UL (ref 3.8–5.2)
SODIUM SERPL-SCNC: 138 MMOL/L (ref 136–145)
TRIGL SERPL-MCNC: 74 MG/DL (ref ?–150)
VLDLC SERPL CALC-MCNC: 14.8 MG/DL
WBC # BLD AUTO: 4.4 K/UL (ref 3.6–11)

## 2021-07-30 PROCEDURE — 99214 OFFICE O/P EST MOD 30 MIN: CPT | Performed by: NURSE PRACTITIONER

## 2021-07-30 NOTE — PROGRESS NOTES
Chief Complaint   Patient presents with    Hypertension        Knee Pain       1. Have you been to the ER, urgent care clinic since your last visit? Hospitalized since your last visit? No    2. Have you seen or consulted any other health care providers outside of the [address] [place] [address] since your last visit? Include any pap smears or colon screening.  No    3 most recent PHQ Screens 11/25/2020   Little interest or pleasure in doing things Nearly every day   Feeling down, depressed, irritable, or hopeless Nearly every day   Total Score PHQ 2 6   Trouble falling or staying asleep, or sleeping too much Not at all   Feeling tired or having little energy Nearly every day   Poor appetite, weight loss, or overeating Nearly every day   Feeling bad about yourself - or that you are a failure or have let yourself or your family down Nearly every day   Trouble concentrating on things such as school, work, reading, or watching TV More than half the days   Moving or speaking so slowly that other people could have noticed; or the opposite being so fidgety that others notice More than half the days   Thoughts of being better off dead, or hurting yourself in some way Several days   PHQ 9 Score 20   How difficult have these problems made it for you to do your work, take care of your home and get along with others Extremely difficult

## 2021-07-30 NOTE — PROGRESS NOTES
[facility] Note  Subjective:      Valeria Almeida is a 50 y.o. female who presents for follow up on chronic problems, she has history of heart palpitation, morbid obesity, insomnia and asthma,depression, joint pain, and morbid obesity. She is taking Topral XL for heart palpitation but states that she still get heart palpitation at home   She is taking medication for joint pain  She is due for mammogram and colonoscopy    Past Medical History:   Diagnosis Date    Asthma     Heart palpitations     Heart palpitations     Insomnia     Morbid obesity with BMI of 40.0-44.9, adult (HCC)      Past Surgical History:   Procedure Laterality Date    HX  SECTION      HX CHOLECYSTECTOMY      HX KNEE ARTHROSCOPY      HX RHINOPLASTY         Current Outpatient Medications   Medication Sig Dispense Refill    diclofenac EC (VOLTAREN) 75 mg EC tablet Take 1 Tablet by mouth two (2) times a day. 30 Tablet 0    DULoxetine (CYMBALTA) 60 mg capsule TAKE ONE CAPSULE BY MOUTH DAILY 90 Capsule 0    metoprolol succinate (TOPROL-XL) 50 mg XL tablet Take 1 Tablet by mouth daily. 90 Tablet 0    zolpidem (AMBIEN) 10 mg tablet TAKE ONE TABLET BY MOUTH EVERY NIGHT AT BEDTIME AS NEEDED FOR SLEEP *MAXIMUM OF 1 TABLET DAILY* 90 Tablet 0    montelukast (SINGULAIR) 10 mg tablet TAKE ONE TABLET BY MOUTH DAILY 90 Tab 0    fluticasone propionate (FLONASE) 50 mcg/actuation nasal spray SPRAY TWO SPRAYS IN EACH NOSTRIL ONCE DAILY 1 Bottle 5    multivitamin (ONE A DAY) tablet Take 1 Tab by mouth daily.  fexofenadine-pseudoephedrine (Allegra-D 12 Hour)  mg Tb12 Take 1 Tab by mouth every twelve (12) hours.  budesonide-formoteroL (Symbicort) 160-4.5 mcg/actuation HFAA Take 2 Puffs by inhalation.  HYDROcodone-acetaminophen (NORCO) 5-325 mg per tablet Take 2 Tabs by mouth every four (4) hours as needed for Pain.       cyclobenzaprine (FLEXERIL) 5 mg tablet TAKE ONE TABLET BY MOUTH ONCE NIGHTLY (Patient not taking: Reported on 7/30/2021) 90 Tablet 0    albuterol (PROVENTIL HFA, VENTOLIN HFA, PROAIR HFA) 90 mcg/actuation inhaler Take 2 Puffs by inhalation every four (4) hours as needed for Wheezing. (Patient not taking: Reported on 7/30/2021)       Allergies   Allergen Reactions    Prednisone Other (comments)    Amoxicillin Rash    Azithromycin Rash       ROS:   Complete review of systems was reviewed with pertinent information listed in HPI. Review of Systems   Constitutional: Negative. HENT: Negative. Respiratory: Negative. Cardiovascular: Positive for palpitations. Gastrointestinal: Negative. Genitourinary: Negative. Musculoskeletal: Positive for joint pain. Psychiatric/Behavioral: Positive for depression. The patient has insomnia. Objective:     Visit Vitals  /78 (BP 1 Location: Right upper arm, BP Patient Position: Sitting, BP Cuff Size: Large adult)   Pulse 83   Temp 97.2 °F (36.2 °C) (Temporal)   Resp 18   Ht 5' 6\" (1.676 m)   Wt 275 lb 6.4 oz (124.9 kg)   SpO2 97%   BMI 44.45 kg/m²       Vitals and Nurse Documentation reviewed. Physical Exam  Constitutional:       Appearance: Normal appearance. She is obese. HENT:      Mouth/Throat:      Mouth: Mucous membranes are moist.   Cardiovascular:      Rate and Rhythm: Normal rate and regular rhythm. Pulses: Normal pulses. Heart sounds: Normal heart sounds. No murmur heard. Pulmonary:      Effort: Pulmonary effort is normal.      Breath sounds: Normal breath sounds. Abdominal:      General: Bowel sounds are normal.      Palpations: Abdomen is soft. Musculoskeletal:      Cervical back: Normal range of motion and neck supple. Neurological:      Mental Status: She is alert. Psychiatric:         Mood and Affect: Mood normal.         Thought Content: Thought content normal.         Assessment/Plan:     Diagnoses and all orders for this visit:    1. Heart palpitations  -     LIPID PANEL;  Future  - METABOLIC PANEL, COMPREHENSIVE; Future  -     CBC W/O DIFF; Future    2. Severe obesity ([address]    3. Insomnia, unspecified type    4. Visit for screening mammogram  -     [facility] MAMMO BI SCREENING INCL CAD; Future    5. Encounter for screening colonoscopy  -     REFERRAL TO GASTROENTEROLOGY    6. Other depression    Continue with Cymbalta    7.  Arthralgia, unspecified joint     Continue with current medication          Pt expressed understanding with the diagnosis and plan        Discussed expected course/resolution/complications of diagnosis in detail with patient.    Medication risks/benefits/costs/interactions/alternatives discussed with patient.    Pt was given an after visit summary which includes diagnoses, current medications & vitals.  Pt expressed understanding with the diagnosis and plan

## 2021-08-11 DIAGNOSIS — M25.50 ARTHRALGIA, UNSPECIFIED JOINT: ICD-10-CM

## 2021-08-11 RX ORDER — DICLOFENAC SODIUM 75 MG/1
TABLET, DELAYED RELEASE ORAL
Qty: 30 TABLET | Refills: 0 | Status: SHIPPED | OUTPATIENT
Start: 2021-08-11 | End: 2021-09-08

## 2021-08-23 RX ORDER — MONTELUKAST SODIUM 10 MG/1
TABLET ORAL
Qty: 90 TABLET | Refills: 0 | Status: SHIPPED | OUTPATIENT
Start: 2021-08-23 | End: 2021-11-23

## 2021-08-24 DIAGNOSIS — F51.01 PRIMARY INSOMNIA: ICD-10-CM

## 2021-08-25 RX ORDER — ZOLPIDEM TARTRATE 10 MG/1
TABLET ORAL
Qty: 90 TABLET | Refills: 0 | Status: SHIPPED | OUTPATIENT
Start: 2021-08-25 | End: 2021-11-23

## 2021-09-06 DIAGNOSIS — F32.89 OTHER DEPRESSION: ICD-10-CM

## 2021-09-06 RX ORDER — DULOXETIN HYDROCHLORIDE 60 MG/1
CAPSULE, DELAYED RELEASE ORAL
Qty: 90 CAPSULE | Refills: 0 | Status: SHIPPED | OUTPATIENT
Start: 2021-09-06 | End: 2021-12-05

## 2021-09-07 DIAGNOSIS — M25.50 ARTHRALGIA, UNSPECIFIED JOINT: ICD-10-CM

## 2021-09-08 RX ORDER — CYCLOBENZAPRINE HCL 5 MG
TABLET ORAL
Qty: 90 TABLET | Refills: 0 | Status: SHIPPED | OUTPATIENT
Start: 2021-09-08 | End: 2021-12-12 | Stop reason: SDUPTHER

## 2021-09-08 RX ORDER — DICLOFENAC SODIUM 75 MG/1
TABLET, DELAYED RELEASE ORAL
Qty: 30 TABLET | Refills: 0 | Status: SHIPPED | OUTPATIENT
Start: 2021-09-08 | End: 2021-09-24

## 2021-09-24 DIAGNOSIS — M25.50 ARTHRALGIA, UNSPECIFIED JOINT: ICD-10-CM

## 2021-09-24 RX ORDER — DICLOFENAC SODIUM 75 MG/1
TABLET, DELAYED RELEASE ORAL
Qty: 30 TABLET | Refills: 0 | Status: SHIPPED | OUTPATIENT
Start: 2021-09-24 | End: 2021-10-17

## 2021-09-27 DIAGNOSIS — Z12.31 VISIT FOR SCREENING MAMMOGRAM: ICD-10-CM

## 2021-10-16 DIAGNOSIS — M25.50 ARTHRALGIA, UNSPECIFIED JOINT: ICD-10-CM

## 2021-10-17 RX ORDER — DICLOFENAC SODIUM 75 MG/1
TABLET, DELAYED RELEASE ORAL
Qty: 30 TABLET | Refills: 0 | Status: SHIPPED | OUTPATIENT
Start: 2021-10-17 | End: 2021-11-07

## 2021-10-29 ENCOUNTER — OFFICE VISIT (OUTPATIENT)
Dept: CARDIOLOGY CLINIC | Age: 56
End: 2021-10-29
Payer: COMMERCIAL

## 2021-10-29 VITALS
SYSTOLIC BLOOD PRESSURE: 130 MMHG | RESPIRATION RATE: 16 BRPM | WEIGHT: 293 LBS | DIASTOLIC BLOOD PRESSURE: 88 MMHG | HEART RATE: 88 BPM | OXYGEN SATURATION: 98 % | BODY MASS INDEX: 47.09 KG/M2 | HEIGHT: 66 IN

## 2021-10-29 DIAGNOSIS — I49.1 PAC (PREMATURE ATRIAL CONTRACTION): ICD-10-CM

## 2021-10-29 DIAGNOSIS — Z82.49 FAMILY HISTORY OF EARLY CAD: ICD-10-CM

## 2021-10-29 DIAGNOSIS — R00.2 HEART PALPITATIONS: ICD-10-CM

## 2021-10-29 DIAGNOSIS — I10 BENIGN ESSENTIAL HTN: ICD-10-CM

## 2021-10-29 DIAGNOSIS — I20.0 UNSTABLE ANGINA (HCC): Primary | ICD-10-CM

## 2021-10-29 PROCEDURE — 99214 OFFICE O/P EST MOD 30 MIN: CPT | Performed by: INTERNAL MEDICINE

## 2021-10-29 PROCEDURE — 93000 ELECTROCARDIOGRAM COMPLETE: CPT | Performed by: INTERNAL MEDICINE

## 2021-10-29 NOTE — PATIENT INSTRUCTIONS
You will be scheduled for a Nuclear Stress Test after your appointment today. Nuclear stress testing evaluates blood flow to your heart muscle and assesses cardiac function. There are 2 parts (Rest/Stress) to this procedure and will include either an exercise on a treadmill or an IV administration of a stressing medication called Lexiscan. Your cardiologist will determine which type of testing is best for you. This test can be performed in one day unless it is determined that better quality images will be obtained by performing the test over two days. *Please arrive 15 minutes prior to your appointment time    Test Duration:    -One day testing will take 4 hours   -Two day testing will take 2 hours each day. Your second day(resting images) will be scheduled after the first day is completed    Day of testing instructions:    1. NO CAFFEINE (not even decaffeinated products) 24 HOURS PRIOR TO TESTING. This includes coffee, soda, tea, chocolate, multivitamins, and migraine medication, like Excedrin or Fioricet that contains caffeine. 2. Nothing to eat or drink 4 HOURS prior to testing  3. NO NICOTINE 12 hours prior to testing  4. Hold any medications requested by your cardiologist. Otherwise take medications as directed with a few sips of water. If you are unsure you may bring your medications with you to take after instructed by your stressing nurse. It is recommended you hold no medications prior to your test. DIABETIC PATIENTS: Take half of your insulin with a light meal 4 hours before your test.  5. Wear comfortable clothes and shoes (Shirts with no metal, shorts or pants, tennis shoes, no heels or flip flops)    IMPORTANT: This testing involves a cardiac tracer ordered specifically for you. If you are unable to make your appointment, please call to cancel/reschedule AT LEAST 24 hours prior to your appointment so your tracer can be cancelled. [phone].

## 2021-10-29 NOTE — PROGRESS NOTES
[place] [address]: [place]  [phone]    History of Present Illness:  Ms. Luis Leon is a 48 yo F with a history of obesity, status post gastric bypass seven years ago, but significant weight gain after losing her mom last year, fibromyalgia, essential hypertension, strong family history of early coronary artery disease, referred by Jaimee Irwin NP for cardiac evaluation. She has had various symptoms including palpitations that have been occurring over the last few months, now occurring daily, lasting a few seconds at a time, with no clear triggers. She also has been having episodes of chest pain, diaphoresis, substernal, sometimes associated with stressors taking care of their autistic granddaughter or other stressors. She does get shortness of breath at times that she attributes to allergies and congestion. As noted above, she has not been exercising as much and has significant weight gain since dealing with her mother's loss last year. She is compensated on exam with clear lungs and trace lower extremity edema. Her EKG was normal sinus rhythm, nonspecific ST-T wave abnormality. Fam hx. Dad with CABG, valve surgery. Soc hx. No tobacco  Assessment and Plan:   1. Palpitations. Will obtain a 24 hour Holter and echocardiogram for further evaluation. 2. Murmur. On exam, she has II/VI systolic murmur at the left sternal border and will evaluate this. 2. Unstable angina. Chest pain with typical and atypical features and multiple risk factors; will obtain a stress test for further evaluation. She cannot fully complete a treadmill due to issues with her hips and legs and will do this Lexiscan. 3. Obesity, status post gastric bypass. 4. Family history of early coronary artery disease. 5. Fibromyalgia. 6. Essential hypertension. Blood pressure is controlled.         She  has a past medical history of Asthma, Depression, Heart palpitations, Heart palpitations, Insomnia, and Morbid obesity with BMI of 40.0-44.9, adult ([facility] [address] All other systems negative except as above. PE  Vitals:    10/29/21 0853   BP: 130/88   Pulse: 88   Resp: 16   SpO2: 98%   Weight: 294 lb (133.4 kg)   Height: 5' 6\" (1.676 m)    Body mass index is 47.45 kg/m².    General appearance - alert, well appearing, and in no distress  Mental status - affect appropriate to mood  Eyes - sclera anicteric, moist mucous membranes  Neck - supple, no JVD  Chest - clear to auscultation, no wheezes, rales or rhonchi  Heart - normal rate, regular rhythm, normal S1, S2, II/VI systolic murmur LUSB  Abdomen - soft, nontender, nondistended, no masses or organomegaly  Neurological -no focal deficit  Extremities - peripheral pulses normal, no pedal edema      Recent Labs:  Lab Results   Component Value Date/Time    Cholesterol, total 229 (H) 07/30/2021 10:06 AM    HDL Cholesterol 108 07/30/2021 10:06 AM    LDL, calculated 106.2 (H) 07/30/2021 10:06 AM    Triglyceride 74 07/30/2021 10:06 AM    CHOL/HDL Ratio 2.1 07/30/2021 10:06 AM     Lab Results   Component Value Date/Time    Creatinine 0.53 (L) 07/30/2021 10:06 AM     Lab Results   Component Value Date/Time    BUN 13 07/30/2021 10:06 AM     Lab Results   Component Value Date/Time    Potassium 4.8 07/30/2021 10:06 AM     No results found for: HBA1C, TEI7YPJW, [id number]  Lab Results   Component Value Date/Time    HGB 11.3 (L) 07/30/2021 10:06 AM     Lab Results   Component Value Date/Time    PLATELET 871 82/84/6731 10:06 AM       Reviewed:  Past Medical History:   Diagnosis Date    Asthma     Depression     Heart palpitations     Heart palpitations     Insomnia     Morbid obesity with BMI of 40.0-44.9, adult ([facility])      Social History     Tobacco Use   Smoking Status Never Smoker   Smokeless Tobacco Never Used     Social History     Substance and Sexual Activity   Alcohol Use Never     Allergies   Allergen Reactions    Prednisone Other (comments)    Amoxicillin Rash    Azithromycin Rash Current Outpatient Medications   Medication Sig    budesonide/glycopyr/formoterol (BREZTRI AEROSPHERE IN) Take  by inhalation.  diclofenac EC (VOLTAREN) 75 mg EC tablet TAKE ONE TABLET BY MOUTH TWICE A DAY    cyclobenzaprine (FLEXERIL) 5 mg tablet TAKE ONE TABLET BY MOUTH ONCE NIGHTLY    DULoxetine (CYMBALTA) 60 mg capsule TAKE ONE CAPSULE BY MOUTH DAILY    zolpidem (AMBIEN) 10 mg tablet TAKE ONE TABLET BY MOUTH EVERY NIGHT AT BEDTIME AS NEEDED FOR SLEEP *MAXIMUM OF 1 TABLET DAILY*    metoprolol succinate (TOPROL-XL) 50 mg XL tablet Take 1 Tablet by mouth daily.  fluticasone propionate (FLONASE) 50 mcg/actuation nasal spray SPRAY TWO SPRAYS IN EACH NOSTRIL ONCE DAILY    multivitamin (ONE A DAY) tablet Take 1 Tab by mouth daily.  fexofenadine-pseudoephedrine (Allegra-D 12 Hour)  mg Tb12 Take 1 Tab by mouth every twelve (12) hours.  HYDROcodone-acetaminophen (NORCO) 5-325 mg per tablet Take 2 Tabs by mouth every four (4) hours as needed for Pain.  montelukast (SINGULAIR) 10 mg tablet TAKE ONE TABLET BY MOUTH DAILY (Patient not taking: Reported on 10/29/2021)    budesonide-formoteroL (Symbicort) 160-4.5 mcg/actuation HFAA Take 2 Puffs by inhalation.  albuterol (PROVENTIL HFA, VENTOLIN HFA, PROAIR HFA) 90 mcg/actuation inhaler Take 2 Puffs by inhalation every four (4) hours as needed for Wheezing. (Patient not taking: Reported on 7/30/2021)     No current facility-administered medications for this visit.        Moon Moralez MD  [facility] heart and Vascular [place]  [address] [facility] [address]  [address]

## 2021-11-07 DIAGNOSIS — M25.50 ARTHRALGIA, UNSPECIFIED JOINT: ICD-10-CM

## 2021-11-07 RX ORDER — DICLOFENAC SODIUM 75 MG/1
TABLET, DELAYED RELEASE ORAL
Qty: 30 TABLET | Refills: 0 | Status: SHIPPED | OUTPATIENT
Start: 2021-11-07 | End: 2021-12-01 | Stop reason: SDUPTHER

## 2021-11-16 ENCOUNTER — ANCILLARY PROCEDURE (OUTPATIENT)
Dept: CARDIOLOGY CLINIC | Age: 56
End: 2021-11-16
Payer: COMMERCIAL

## 2021-11-16 VITALS
WEIGHT: 293 LBS | HEIGHT: 66 IN | BODY MASS INDEX: 47.09 KG/M2 | SYSTOLIC BLOOD PRESSURE: 120 MMHG | DIASTOLIC BLOOD PRESSURE: 76 MMHG

## 2021-11-16 DIAGNOSIS — M79.7 FIBROMYALGIA: ICD-10-CM

## 2021-11-16 DIAGNOSIS — R00.2 HEART PALPITATIONS: ICD-10-CM

## 2021-11-16 DIAGNOSIS — Z82.49 FAMILY HISTORY OF EARLY CAD: ICD-10-CM

## 2021-11-16 DIAGNOSIS — I20.0 UNSTABLE ANGINA (HCC): ICD-10-CM

## 2021-11-16 DIAGNOSIS — I49.1 PAC (PREMATURE ATRIAL CONTRACTION): ICD-10-CM

## 2021-11-16 DIAGNOSIS — I10 BENIGN ESSENTIAL HTN: ICD-10-CM

## 2021-11-16 PROCEDURE — 93015 CV STRESS TEST SUPVJ I&R: CPT | Performed by: INTERNAL MEDICINE

## 2021-11-16 PROCEDURE — A9500 TC99M SESTAMIBI: HCPCS

## 2021-11-16 PROCEDURE — 78452 HT MUSCLE IMAGE SPECT MULT: CPT | Performed by: INTERNAL MEDICINE

## 2021-11-16 RX ORDER — TETRAKIS(2-METHOXYISOBUTYLISOCYANIDE)COPPER(I) TETRAFLUOROBORATE 1 MG/ML
40 INJECTION, POWDER, LYOPHILIZED, FOR SOLUTION INTRAVENOUS ONCE
Status: COMPLETED | OUTPATIENT
Start: 2021-11-16 | End: 2021-11-16

## 2021-11-16 RX ADMIN — TETRAKIS(2-METHOXYISOBUTYLISOCYANIDE)COPPER(I) TETRAFLUOROBORATE 25.8 MILLICURIE: 1 INJECTION, POWDER, LYOPHILIZED, FOR SOLUTION INTRAVENOUS at 08:50

## 2021-11-17 ENCOUNTER — APPOINTMENT (OUTPATIENT)
Dept: CARDIOLOGY CLINIC | Age: 56
End: 2021-11-17

## 2021-11-17 LAB
STRESS BASELINE DIAS BP: 76 MMHG
STRESS BASELINE HR: 80 BPM
STRESS BASELINE SYS BP: 120 MMHG
STRESS O2 SAT PEAK: 99 %
STRESS O2 SAT REST: 99 %
STRESS PEAK DIAS BP: 70 MMHG
STRESS PEAK SYS BP: 134 MMHG
STRESS PERCENT HR ACHIEVED: 60 %
STRESS POST PEAK HR: 100 BPM
STRESS RATE PRESSURE PRODUCT: NORMAL BPM*MMHG
STRESS TARGET HR: 166 BPM

## 2021-11-17 RX ORDER — TETRAKIS(2-METHOXYISOBUTYLISOCYANIDE)COPPER(I) TETRAFLUOROBORATE 1 MG/ML
40 INJECTION, POWDER, LYOPHILIZED, FOR SOLUTION INTRAVENOUS ONCE
Status: COMPLETED | OUTPATIENT
Start: 2021-11-17 | End: 2021-11-17

## 2021-11-17 RX ADMIN — TETRAKIS(2-METHOXYISOBUTYLISOCYANIDE)COPPER(I) TETRAFLUOROBORATE 26.4 MILLICURIE: 1 INJECTION, POWDER, LYOPHILIZED, FOR SOLUTION INTRAVENOUS at 08:40

## 2021-11-18 ENCOUNTER — TELEPHONE (OUTPATIENT)
Dept: CARDIOLOGY CLINIC | Age: 56
End: 2021-11-18

## 2021-11-18 NOTE — TELEPHONE ENCOUNTER
----- Message from Casimiro Delgado MD sent at 11/17/2021  4:40 PM EST -----  Please let pt know stress test was normal. thx

## 2021-11-19 ENCOUNTER — CLINICAL SUPPORT (OUTPATIENT)
Dept: CARDIOLOGY CLINIC | Age: 56
End: 2021-11-19
Payer: COMMERCIAL

## 2021-11-19 DIAGNOSIS — R00.2 HEART PALPITATIONS: Primary | ICD-10-CM

## 2021-11-22 ENCOUNTER — ANCILLARY PROCEDURE (OUTPATIENT)
Dept: CARDIOLOGY CLINIC | Age: 56
End: 2021-11-22
Payer: COMMERCIAL

## 2021-11-22 VITALS
HEIGHT: 66 IN | DIASTOLIC BLOOD PRESSURE: 76 MMHG | WEIGHT: 293 LBS | BODY MASS INDEX: 47.09 KG/M2 | SYSTOLIC BLOOD PRESSURE: 120 MMHG

## 2021-11-22 DIAGNOSIS — I10 BENIGN ESSENTIAL HTN: ICD-10-CM

## 2021-11-22 DIAGNOSIS — Z82.49 FAMILY HISTORY OF EARLY CAD: ICD-10-CM

## 2021-11-22 DIAGNOSIS — I20.0 UNSTABLE ANGINA (HCC): ICD-10-CM

## 2021-11-22 DIAGNOSIS — I49.1 PAC (PREMATURE ATRIAL CONTRACTION): ICD-10-CM

## 2021-11-22 DIAGNOSIS — R00.2 HEART PALPITATIONS: ICD-10-CM

## 2021-11-22 LAB
ECHO AO ASC DIAM: 3.87 CM
ECHO AO ROOT DIAM: 2.93 CM
ECHO AV AREA PEAK VELOCITY: 3.05 CM2
ECHO AV AREA VTI: 2.96 CM2
ECHO AV AREA/BSA PEAK VELOCITY: 1.3 CM2/M2
ECHO AV AREA/BSA VTI: 1.3 CM2/M2
ECHO AV MEAN GRADIENT: 3.71 MMHG
ECHO AV PEAK GRADIENT: 7.69 MMHG
ECHO AV PEAK VELOCITY: 138.68 CM/S
ECHO AV VTI: 29.13 CM
ECHO LA AREA 4C: 22.53 CM2
ECHO LA MAJOR AXIS: 4.08 CM
ECHO LA MINOR AXIS: 1.73 CM
ECHO LA VOL 4C: 70.69 ML (ref 22–52)
ECHO LA VOLUME INDEX A4C: 29.95 ML/M2 (ref 16–28)
ECHO LV E' SEPTAL VELOCITY: 9.18 CM/S
ECHO LV INTERNAL DIMENSION DIASTOLIC: 4.68 CM (ref 3.9–5.3)
ECHO LV INTERNAL DIMENSION SYSTOLIC: 2.95 CM
ECHO LV IVSD: 1.07 CM (ref 0.6–0.9)
ECHO LV MASS 2D: 180.4 G (ref 67–162)
ECHO LV MASS INDEX 2D: 76.4 G/M2 (ref 43–95)
ECHO LV POSTERIOR WALL DIASTOLIC: 1.08 CM (ref 0.6–0.9)
ECHO LVOT DIAM: 2.28 CM
ECHO LVOT PEAK GRADIENT: 4.3 MMHG
ECHO LVOT PEAK VELOCITY: 103.71 CM/S
ECHO LVOT SV: 86.2 ML
ECHO LVOT VTI: 21.16 CM
ECHO MV A VELOCITY: 77.38 CM/S
ECHO MV AREA PHT: 4.4 CM2
ECHO MV E DECELERATION TIME (DT): 172.49 MS
ECHO MV E VELOCITY: 103.35 CM/S
ECHO MV E/A RATIO: 1.34
ECHO MV E/E' SEPTAL: 11.26
ECHO MV PRESSURE HALF TIME (PHT): 50.02 MS
ECHO RA AREA 4C: 14.76 CM2
ECHO RV INTERNAL DIMENSION: 4.02 CM
ECHO RV TAPSE: 3.03 CM (ref 1.5–2)

## 2021-11-22 PROCEDURE — 93306 TTE W/DOPPLER COMPLETE: CPT | Performed by: INTERNAL MEDICINE

## 2021-11-23 DIAGNOSIS — F51.01 PRIMARY INSOMNIA: ICD-10-CM

## 2021-11-23 RX ORDER — ZOLPIDEM TARTRATE 10 MG/1
TABLET ORAL
Qty: 90 TABLET | Refills: 0 | Status: SHIPPED | OUTPATIENT
Start: 2021-11-23 | End: 2022-10-14 | Stop reason: SDUPTHER

## 2021-11-23 RX ORDER — MONTELUKAST SODIUM 10 MG/1
TABLET ORAL
Qty: 90 TABLET | Refills: 0 | Status: SHIPPED | OUTPATIENT
Start: 2021-11-23 | End: 2022-03-08

## 2021-11-24 PROCEDURE — 93227 XTRNL ECG REC<48 HR R&I: CPT | Performed by: INTERNAL MEDICINE

## 2021-11-24 PROCEDURE — 93225 XTRNL ECG REC<48 HRS REC: CPT | Performed by: INTERNAL MEDICINE

## 2021-11-27 DIAGNOSIS — R00.2 HEART PALPITATIONS: ICD-10-CM

## 2021-11-28 RX ORDER — METOPROLOL SUCCINATE 50 MG/1
TABLET, EXTENDED RELEASE ORAL
Qty: 71 TABLET | Refills: 0 | Status: SHIPPED | OUTPATIENT
Start: 2021-11-28 | End: 2021-12-01

## 2021-11-30 ENCOUNTER — OFFICE VISIT (OUTPATIENT)
Dept: FAMILY MEDICINE CLINIC | Age: 56
End: 2021-11-30
Payer: COMMERCIAL

## 2021-11-30 VITALS
BODY MASS INDEX: 47.09 KG/M2 | OXYGEN SATURATION: 99 % | TEMPERATURE: 98.2 F | DIASTOLIC BLOOD PRESSURE: 84 MMHG | SYSTOLIC BLOOD PRESSURE: 134 MMHG | RESPIRATION RATE: 16 BRPM | HEIGHT: 66 IN | HEART RATE: 97 BPM | WEIGHT: 293 LBS

## 2021-11-30 DIAGNOSIS — K44.9 HIATAL HERNIA: Primary | ICD-10-CM

## 2021-11-30 PROCEDURE — 99213 OFFICE O/P EST LOW 20 MIN: CPT | Performed by: NURSE PRACTITIONER

## 2021-11-30 RX ORDER — BUTALBITAL, ACETAMINOPHEN AND CAFFEINE 50; 325; 40 MG/1; MG/1; MG/1
TABLET ORAL
COMMUNITY
Start: 2021-11-11 | End: 2022-06-15

## 2021-11-30 RX ORDER — PANTOPRAZOLE SODIUM 40 MG/1
40 TABLET, DELAYED RELEASE ORAL DAILY
Qty: 30 TABLET | Refills: 0 | Status: SHIPPED | OUTPATIENT
Start: 2021-11-30 | End: 2021-12-27

## 2021-11-30 NOTE — PROGRESS NOTES
Chief Complaint   Patient presents with    Chest Pain     gagging, coughing, belching all of the time, vomitting     Hiatal Hernia     would like a referral to Dr. Marciano Conley       1. \"Have you been to the ER, urgent care clinic since your last visit? Hospitalized since your last visit? \" Yes When: [id number] Where: [facility] Reason for visit: sinus infection - bad headaches    2. \"Have you seen or consulted any other health care providers outside of the [address] [place] [address] since your last visit? \" No     3. For patients over 45: Has the patient had a colonoscopy? No     If the patient is female:    4. For patients over 40: Has the patient had a mammogram? Yes, HM satisfied with blue hyperlink    5. For patients over 21: Has the patient had a pap smear?  No     Active dx of Depression    3 most recent PHQ Screens 11/25/2020   Little interest or pleasure in doing things Nearly every day   Feeling down, depressed, irritable, or hopeless Nearly every day   Total Score PHQ 2 6   Trouble falling or staying asleep, or sleeping too much Not at all   Feeling tired or having little energy Nearly every day   Poor appetite, weight loss, or overeating Nearly every day   Feeling bad about yourself - or that you are a failure or have let yourself or your family down Nearly every day   Trouble concentrating on things such as school, work, reading, or watching TV More than half the days   Moving or speaking so slowly that other people could have noticed; or the opposite being so fidgety that others notice More than half the days   Thoughts of being better off dead, or hurting yourself in some way Several days   PHQ 9 Score 20   How difficult have these problems made it for you to do your work, take care of your home and get along with others Extremely difficult

## 2021-11-30 NOTE — PROGRESS NOTES
[facility] Note  Subjective:      Amber Grimes is a 49 y.o. female who presents for epigastric pain with GERD. She has history of hiatal hernia, asthma, heart palpitation, insomnia and morbid obesity. She states that she was diagnosed with hiatal hernia in the past and that causing her to gag and cough with burning in chest. She has appointment with GI in December and requesting referral.     Past Medical History:   Diagnosis Date    Arthritis     Asthma     Depression     Heart palpitations     Heart palpitations     Hiatal hernia     Insomnia          Morbid obesity with BMI of 45.0-49.9, adult ([address]        Past Surgical History:   Procedure Laterality Date    HX  SECTION      HX CHOLECYSTECTOMY      HX KNEE ARTHROSCOPY      HX RHINOPLASTY         Current Outpatient Medications   Medication Sig Dispense Refill    butalbital-acetaminophen-caffeine (FIORICET, ESGIC) -40 mg per tablet       pantoprazole (PROTONIX) 40 mg tablet Take 1 Tablet by mouth daily. 30 Tablet 0    metoprolol succinate (TOPROL-XL) 50 mg XL tablet TAKE ONE TABLET BY MOUTH DAILY 71 Tablet 0    montelukast (SINGULAIR) 10 mg tablet TAKE ONE TABLET BY MOUTH DAILY 90 Tablet 0    zolpidem (AMBIEN) 10 mg tablet TAKE ONE TABLET BY MOUTH EVERY NIGHT AT BEDTIME AS NEEDED FOR SLEEP *MAXIMUM OF 1 TABLET DAILY* 90 Tablet 0    diclofenac EC (VOLTAREN) 75 mg EC tablet TAKE ONE TABLET BY MOUTH TWICE A DAY 30 Tablet 0    budesonide/glycopyr/formoterol (BREZTRI AEROSPHERE IN) Take  by inhalation.  cyclobenzaprine (FLEXERIL) 5 mg tablet TAKE ONE TABLET BY MOUTH ONCE NIGHTLY 90 Tablet 0    DULoxetine (CYMBALTA) 60 mg capsule TAKE ONE CAPSULE BY MOUTH DAILY 90 Capsule 0    fluticasone propionate (FLONASE) 50 mcg/actuation nasal spray SPRAY TWO SPRAYS IN EACH NOSTRIL ONCE DAILY 1 Bottle 5    multivitamin (ONE A DAY) tablet Take 1 Tab by mouth daily.       fexofenadine-pseudoephedrine (Allegra-D 12 Hour)  mg Tb12 Take 1 Tab by mouth every twelve (12) hours.  budesonide-formoteroL (Symbicort) 160-4.5 mcg/actuation HFAA Take 2 Puffs by inhalation.  HYDROcodone-acetaminophen (NORCO) 5-325 mg per tablet Take 2 Tabs by mouth every four (4) hours as needed for Pain.  albuterol (PROVENTIL HFA, VENTOLIN HFA, PROAIR HFA) 90 mcg/actuation inhaler Take 2 Puffs by inhalation every four (4) hours as needed for Wheezing. Allergies   Allergen Reactions    Prednisone Other (comments)    Amoxicillin Rash    Azithromycin Rash       ROS:   Complete review of systems was reviewed with pertinent information listed in HPI. Review of Systems   Constitutional: Negative. HENT: Negative. Respiratory: Negative. Cardiovascular: Negative. Gastrointestinal: Positive for heartburn. Genitourinary: Negative. Musculoskeletal: Negative. Psychiatric/Behavioral: Positive for depression. Objective:     Visit Vitals  /84 (BP 1 Location: Right arm, BP Patient Position: Sitting, BP Cuff Size: Large adult)   Pulse 97   Temp 98.2 °F (36.8 °C) (Temporal)   Resp 16   Ht 5' 6\" (1.676 m)   Wt 299 lb 9.6 oz (135.9 kg)   SpO2 99%   BMI 48.36 kg/m²       Vitals and Nurse Documentation reviewed. Physical Exam  Constitutional:       Appearance: Normal appearance. She is obese. HENT:      Mouth/Throat:      Mouth: Mucous membranes are moist.   Cardiovascular:      Rate and Rhythm: Normal rate and regular rhythm. Pulses: Normal pulses. Heart sounds: Normal heart sounds. No murmur heard. Pulmonary:      Effort: Pulmonary effort is normal.      Breath sounds: Normal breath sounds. Abdominal:      General: Bowel sounds are normal.      Palpations: Abdomen is soft. Musculoskeletal:      Cervical back: Normal range of motion and neck supple. Neurological:      Mental Status: She is alert. Psychiatric:         Mood and Affect: Mood normal.         Thought Content:  Thought content normal.         Assessment/Plan:     Diagnoses and all orders for this visit:    1. Hiatal hernia  -     pantoprazole (PROTONIX) 40 mg tablet;  Take 1 Tablet by mouth daily.  -     REFERRAL TO GASTROENTEROLOGY          Pt expressed understanding with the diagnosis and plan        Discussed expected course/resolution/complications of diagnosis in detail with patient.    Medication risks/benefits/costs/interactions/alternatives discussed with patient.    Pt was given an after visit summary which includes diagnoses, current medications & vitals.  Pt expressed understanding with the diagnosis and plan

## 2021-12-01 ENCOUNTER — TELEPHONE (OUTPATIENT)
Dept: CARDIOLOGY CLINIC | Age: 56
End: 2021-12-01

## 2021-12-01 DIAGNOSIS — M25.50 ARTHRALGIA, UNSPECIFIED JOINT: ICD-10-CM

## 2021-12-01 DIAGNOSIS — R00.2 HEART PALPITATIONS: ICD-10-CM

## 2021-12-01 RX ORDER — DICLOFENAC SODIUM 75 MG/1
TABLET, DELAYED RELEASE ORAL
Qty: 30 TABLET | Refills: 0 | Status: SHIPPED | OUTPATIENT
Start: 2021-12-01 | End: 2022-01-02

## 2021-12-01 RX ORDER — METOPROLOL SUCCINATE 50 MG/1
75 TABLET, EXTENDED RELEASE ORAL DAILY
Qty: 135 TABLET | Refills: 1 | Status: SHIPPED | OUTPATIENT
Start: 2021-12-01 | End: 2022-09-20

## 2021-12-01 NOTE — TELEPHONE ENCOUNTER
Micha Godinez MD  You 1 hour ago (12:34 PM)     SW    Please let pt know monitor showed rare fast heart beats, benign PVCs. Can increase BB 50 to 75 mg for symptom relief if still having palpitations. If less frequent, no change needed.  thx

## 2021-12-05 DIAGNOSIS — F32.89 OTHER DEPRESSION: ICD-10-CM

## 2021-12-05 RX ORDER — DULOXETIN HYDROCHLORIDE 60 MG/1
CAPSULE, DELAYED RELEASE ORAL
Qty: 90 CAPSULE | Refills: 0 | Status: SHIPPED
Start: 2021-12-05 | End: 2022-06-15

## 2021-12-10 DIAGNOSIS — F32.89 OTHER DEPRESSION: Primary | ICD-10-CM

## 2021-12-12 RX ORDER — CYCLOBENZAPRINE HCL 5 MG
TABLET ORAL
Qty: 90 TABLET | Refills: 0 | Status: SHIPPED | OUTPATIENT
Start: 2021-12-12 | End: 2022-03-14

## 2021-12-14 RX ORDER — FLUTICASONE PROPIONATE 50 MCG
SPRAY, SUSPENSION (ML) NASAL
Qty: 1 EACH | Refills: 0 | Status: SHIPPED | OUTPATIENT
Start: 2021-12-14 | End: 2022-02-01

## 2021-12-27 DIAGNOSIS — K44.9 HIATAL HERNIA: ICD-10-CM

## 2021-12-27 RX ORDER — PANTOPRAZOLE SODIUM 40 MG/1
TABLET, DELAYED RELEASE ORAL
Qty: 30 TABLET | Refills: 0 | Status: SHIPPED | OUTPATIENT
Start: 2021-12-27 | End: 2022-02-01

## 2021-12-29 DIAGNOSIS — M25.50 ARTHRALGIA, UNSPECIFIED JOINT: ICD-10-CM

## 2022-01-02 RX ORDER — DICLOFENAC SODIUM 75 MG/1
TABLET, DELAYED RELEASE ORAL
Qty: 30 TABLET | Refills: 0 | Status: SHIPPED | OUTPATIENT
Start: 2022-01-02 | End: 2022-02-01

## 2022-01-30 DIAGNOSIS — M25.50 ARTHRALGIA, UNSPECIFIED JOINT: ICD-10-CM

## 2022-02-01 DIAGNOSIS — K44.9 HIATAL HERNIA: ICD-10-CM

## 2022-02-01 RX ORDER — DICLOFENAC SODIUM 75 MG/1
TABLET, DELAYED RELEASE ORAL
Qty: 30 TABLET | Refills: 0 | Status: SHIPPED | OUTPATIENT
Start: 2022-02-01 | End: 2022-03-01

## 2022-02-01 RX ORDER — PANTOPRAZOLE SODIUM 40 MG/1
TABLET, DELAYED RELEASE ORAL
Qty: 30 TABLET | Refills: 0 | Status: SHIPPED | OUTPATIENT
Start: 2022-02-01 | End: 2022-03-08

## 2022-02-01 RX ORDER — FLUTICASONE PROPIONATE 50 MCG
SPRAY, SUSPENSION (ML) NASAL
Qty: 1 EACH | Refills: 0 | Status: SHIPPED | OUTPATIENT
Start: 2022-02-01 | End: 2022-03-08

## 2022-02-18 ENCOUNTER — OFFICE VISIT (OUTPATIENT)
Dept: FAMILY MEDICINE CLINIC | Age: 57
End: 2022-02-18
Payer: COMMERCIAL

## 2022-02-18 VITALS
BODY MASS INDEX: 47.09 KG/M2 | SYSTOLIC BLOOD PRESSURE: 114 MMHG | HEIGHT: 66 IN | TEMPERATURE: 98 F | WEIGHT: 293 LBS | DIASTOLIC BLOOD PRESSURE: 78 MMHG | HEART RATE: 86 BPM | RESPIRATION RATE: 16 BRPM | OXYGEN SATURATION: 98 %

## 2022-02-18 DIAGNOSIS — K44.9 HIATAL HERNIA: ICD-10-CM

## 2022-02-18 DIAGNOSIS — F32.89 OTHER DEPRESSION: ICD-10-CM

## 2022-02-18 DIAGNOSIS — E66.01 MORBID OBESITY WITH BMI OF 45.0-49.9, ADULT (HCC): ICD-10-CM

## 2022-02-18 DIAGNOSIS — R10.13 EPIGASTRIC PAIN: Primary | ICD-10-CM

## 2022-02-18 DIAGNOSIS — Z13.220 SCREENING, LIPID: ICD-10-CM

## 2022-02-18 PROCEDURE — 99214 OFFICE O/P EST MOD 30 MIN: CPT | Performed by: NURSE PRACTITIONER

## 2022-02-18 RX ORDER — TOPIRAMATE 50 MG/1
50 TABLET, FILM COATED ORAL 2 TIMES DAILY
COMMUNITY
Start: 2022-02-11

## 2022-02-18 NOTE — PROGRESS NOTES
Chief Complaint   Patient presents with    Medication Evaluation    Hernia (Non Specific)    Ulcer     after eating, patient is gasping for air     Other     patient would like to revisit surgery clearence       1. \"Have you been to the ER, urgent care clinic since your last visit? Hospitalized since your last visit? \" No    2. \"Have you seen or consulted any other health care providers outside of the [address] [place] [address] since your last visit? \" Yes When: 02/22 Where: Jesse Cadena, psychiatrist Reason for visit: follow up Bessie Parsons  Therapist - Bo Noel     3. For patients over 45: Has the patient had a colonoscopy? Yes - no Care Gap present     If the patient is female:    4. For patients over 40: Has the patient had a mammogram? Yes - no Care Gap present    5. For patients over 21: Has the patient had a pap smear?  Yes - no Care Gap present    3 most recent PHQ Screens 2/18/2022   PHQ Not Done Patient refuses   Little interest or pleasure in doing things -   Feeling down, depressed, irritable, or hopeless -   Total Score PHQ 2 -   Trouble falling or staying asleep, or sleeping too much -   Feeling tired or having little energy -   Poor appetite, weight loss, or overeating -   Feeling bad about yourself - or that you are a failure or have let yourself or your family down -   Trouble concentrating on things such as school, work, reading, or watching TV -   Moving or speaking so slowly that other people could have noticed; or the opposite being so fidgety that others notice -   Thoughts of being better off dead, or hurting yourself in some way -   PHQ 9 Score -   How difficult have these problems made it for you to do your work, take care of your home and get along with others -

## 2022-02-18 NOTE — PROGRESS NOTES
[facility] Note  Subjective:      Chuck Arana is a 49 y.o. female who presents for intermittent epigastric pain and that she get SOB after eating . She saw gastroenterologist -- Dr Claudy Knight- at [address] [facility], who informed her that she has hiatal hernia, and referred her to Dr Hasmukh Alberto surgeon to revise her gastric bypass and repair her hernia. She hasn't seen surgeon yet,  but requesting to get a letter for surgical clearance. She also believes that she has peptic ulcer. In the past she was referred to DR Aaron Simpson for endoscopy but it was canceled due to patient's refusal to take a Covid test.  Patient informed that she needs to see her surgeon first and schedule surgery , then we can clear her for it. Past Medical History:   Diagnosis Date    Arthritis     Asthma     Depression     Heart palpitations     Heart palpitations     Hiatal hernia     Hiatal hernia     Insomnia     Joint pain     Morbid obesity with BMI of 40.0-44.9, adult (HCC)     Morbid obesity with BMI of 45.0-49.9, adult (HCC)        Past Surgical History:   Procedure Laterality Date    HX  SECTION      HX CHOLECYSTECTOMY      HX KNEE ARTHROSCOPY      HX RHINOPLASTY         Current Outpatient Medications   Medication Sig Dispense Refill    topiramate (TOPAMAX) 50 mg tablet       fluticasone propionate (FLONASE) 50 mcg/actuation nasal spray SPRAY 2 SPRAYS IN EACH NOSTRIL ONCE DAILY 1 Each 0    diclofenac EC (VOLTAREN) 75 mg EC tablet TAKE ONE TABLET BY MOUTH TWICE A DAY 30 Tablet 0    pantoprazole (PROTONIX) 40 mg tablet TAKE ONE TABLET BY MOUTH DAILY 30 Tablet 0    cyclobenzaprine (FLEXERIL) 5 mg tablet TAKE ONE TABLET BY MOUTH ONCE NIGHTLY 90 Tablet 0    DULoxetine (CYMBALTA) 60 mg capsule TAKE ONE CAPSULE BY MOUTH DAILY 90 Capsule 0    metoprolol succinate (TOPROL-XL) 50 mg XL tablet Take 1.5 Tablets by mouth daily.  135 Tablet 1    butalbital-acetaminophen-caffeine (FIORICET, ESGIC) -40 mg per tablet       montelukast (SINGULAIR) 10 mg tablet TAKE ONE TABLET BY MOUTH DAILY 90 Tablet 0    zolpidem (AMBIEN) 10 mg tablet TAKE ONE TABLET BY MOUTH EVERY NIGHT AT BEDTIME AS NEEDED FOR SLEEP *MAXIMUM OF 1 TABLET DAILY* 90 Tablet 0    budesonide/glycopyr/formoterol (BREZTRI AEROSPHERE IN) Take  by inhalation.  multivitamin (ONE A DAY) tablet Take 1 Tab by mouth daily.  fexofenadine-pseudoephedrine (Allegra-D 12 Hour)  mg Tb12 Take 1 Tab by mouth every twelve (12) hours.  budesonide-formoteroL (Symbicort) 160-4.5 mcg/actuation HFAA Take 2 Puffs by inhalation.  HYDROcodone-acetaminophen (NORCO) 5-325 mg per tablet Take 2 Tabs by mouth every four (4) hours as needed for Pain.  albuterol (PROVENTIL HFA, VENTOLIN HFA, PROAIR HFA) 90 mcg/actuation inhaler Take 2 Puffs by inhalation every four (4) hours as needed for Wheezing. Allergies   Allergen Reactions    Prednisone Other (comments)    Amoxicillin Rash    Azithromycin Rash       ROS:   Complete review of systems was reviewed with pertinent information listed in HPI. Review of Systems   Constitutional: Negative. HENT: Negative. Respiratory: Negative. Cardiovascular: Negative. Gastrointestinal: Positive for heartburn. Musculoskeletal: Negative. Psychiatric/Behavioral: Positive for depression. Objective:     Visit Vitals  /78 (BP 1 Location: Left upper arm, BP Patient Position: Sitting, BP Cuff Size: Large adult)   Pulse 86   Temp 98 °F (36.7 °C) (Temporal)   Resp 16   Ht 5' 6\" (1.676 m)   Wt 296 lb 6.4 oz (134.4 kg)   SpO2 98%   BMI 47.84 kg/m²       Vitals and Nurse Documentation reviewed. Physical Exam  Constitutional:       Appearance: Normal appearance. She is obese. HENT:      Mouth/Throat:      Mouth: Mucous membranes are moist.   Cardiovascular:      Rate and Rhythm: Normal rate and regular rhythm. Pulses: Normal pulses. Heart sounds: Normal heart sounds. No murmur heard. Pulmonary:      Effort: Pulmonary effort is normal.      Breath sounds: Normal breath sounds. Abdominal:      General: Bowel sounds are normal.      Palpations: Abdomen is soft. Musculoskeletal:      Cervical back: Normal range of motion and neck supple. Comments: Walking with cane   Neurological:      Mental Status: She is alert. Psychiatric:         Mood and Affect: Mood normal.         Thought Content: Thought content normal.         Assessment/Plan:     Diagnoses and all orders for this visit:    1. Epigastric pain  -     H PYLORI AB, IGG, QT; Future  -     H PYLORI AB, IGM; Future  -     METABOLIC PANEL, COMPREHENSIVE; Future  -     CBC W/O DIFF; Future    2. Screening, lipid  -     LIPID PANEL; Future    3. Hiatal hernia      Follow up with the surgeon    4. Morbid obesity with BMI of 45.0-49.9, adult (HCC)      Diet and exercise    5.  Other depression      Conitnue with current medication  Await labs      Pt expressed understanding with the diagnosis and plan        Discussed expected course/resolution/complications of diagnosis in detail with patient.    Medication risks/benefits/costs/interactions/alternatives discussed with patient.    Pt was given an after visit summary which includes diagnoses, current medications & vitals.  Pt expressed understanding with the diagnosis and plan

## 2022-02-27 DIAGNOSIS — M25.50 ARTHRALGIA, UNSPECIFIED JOINT: ICD-10-CM

## 2022-03-01 RX ORDER — DICLOFENAC SODIUM 75 MG/1
TABLET, DELAYED RELEASE ORAL
Qty: 30 TABLET | Refills: 0 | Status: SHIPPED | OUTPATIENT
Start: 2022-03-01 | End: 2022-03-25

## 2022-03-07 DIAGNOSIS — K44.9 HIATAL HERNIA: ICD-10-CM

## 2022-03-08 RX ORDER — PANTOPRAZOLE SODIUM 40 MG/1
TABLET, DELAYED RELEASE ORAL
Qty: 30 TABLET | Refills: 0 | Status: SHIPPED | OUTPATIENT
Start: 2022-03-08 | End: 2022-04-06 | Stop reason: SDUPTHER

## 2022-03-08 RX ORDER — MONTELUKAST SODIUM 10 MG/1
TABLET ORAL
Qty: 30 TABLET | Refills: 3 | Status: SHIPPED | OUTPATIENT
Start: 2022-03-08 | End: 2022-09-13 | Stop reason: SDUPTHER

## 2022-03-08 RX ORDER — FLUTICASONE PROPIONATE 50 MCG
SPRAY, SUSPENSION (ML) NASAL
Qty: 1 EACH | Refills: 3 | Status: SHIPPED | OUTPATIENT
Start: 2022-03-08 | End: 2022-09-06 | Stop reason: SDUPTHER

## 2022-03-09 ENCOUNTER — TELEPHONE (OUTPATIENT)
Dept: FAMILY MEDICINE CLINIC | Age: 57
End: 2022-03-09

## 2022-03-09 NOTE — TELEPHONE ENCOUNTER
Patient returned call in reference to message left. She indicated that yes she does need to extend her appointment for 3/11/22.     Requesting a call back    Best call back# [phone]

## 2022-03-09 NOTE — TELEPHONE ENCOUNTER
Attempted to call patient. Left voicemail stating appointment was lengthened and no call back is necessary unless she has questions.

## 2022-03-11 ENCOUNTER — OFFICE VISIT (OUTPATIENT)
Dept: FAMILY MEDICINE CLINIC | Age: 57
End: 2022-03-11
Payer: COMMERCIAL

## 2022-03-11 VITALS
SYSTOLIC BLOOD PRESSURE: 122 MMHG | TEMPERATURE: 98 F | DIASTOLIC BLOOD PRESSURE: 71 MMHG | HEIGHT: 66 IN | HEART RATE: 94 BPM | BODY MASS INDEX: 47.09 KG/M2 | RESPIRATION RATE: 16 BRPM | WEIGHT: 293 LBS | OXYGEN SATURATION: 100 %

## 2022-03-11 DIAGNOSIS — G47.00 INSOMNIA, UNSPECIFIED TYPE: ICD-10-CM

## 2022-03-11 DIAGNOSIS — M25.50 ARTHRALGIA, UNSPECIFIED JOINT: ICD-10-CM

## 2022-03-11 DIAGNOSIS — F32.89 OTHER DEPRESSION: ICD-10-CM

## 2022-03-11 DIAGNOSIS — E66.01 MORBID OBESITY WITH BMI OF 45.0-49.9, ADULT (HCC): Primary | ICD-10-CM

## 2022-03-11 PROCEDURE — 99213 OFFICE O/P EST LOW 20 MIN: CPT | Performed by: NURSE PRACTITIONER

## 2022-03-11 NOTE — PROGRESS NOTES
[facility] Note  Subjective:      Precious Ashley is a 49 y.o. female who presents for letter to support her bariatric surgery. She has histoy of morbid obesity, arthritis in multiple joints, asthma, insomnia, fibromyalgia and depression. She is watching her diet but unable to exercise due to arthritis. Past Medical History:   Diagnosis Date    Arthritis     Asthma     Depression     Heart palpitations     Heart palpitations     Hiatal hernia     Hiatal hernia     Insomnia     Joint pain     Morbid obesity with BMI of 40.0-44.9, adult ([facility])     Morbid obesity with BMI of 45.0-49.9, adult ([facility])      ,    Current Outpatient Medications   Medication Sig Dispense Refill    fluticasone propionate (FLONASE) 50 mcg/actuation nasal spray SPRAY 2 SPRAYS IN EACH NOSTRIL ONCE DAILY 1 Each 3    montelukast (SINGULAIR) 10 mg tablet TAKE ONE TABLET BY MOUTH DAILY 30 Tablet 3    pantoprazole (PROTONIX) 40 mg tablet TAKE ONE TABLET BY MOUTH DAILY 30 Tablet 0    diclofenac EC (VOLTAREN) 75 mg EC tablet TAKE ONE TABLET BY MOUTH TWICE A DAY 30 Tablet 0    topiramate (TOPAMAX) 50 mg tablet       cyclobenzaprine (FLEXERIL) 5 mg tablet TAKE ONE TABLET BY MOUTH ONCE NIGHTLY 90 Tablet 0    DULoxetine (CYMBALTA) 60 mg capsule TAKE ONE CAPSULE BY MOUTH DAILY 90 Capsule 0    metoprolol succinate (TOPROL-XL) 50 mg XL tablet Take 1.5 Tablets by mouth daily. 135 Tablet 1    butalbital-acetaminophen-caffeine (FIORICET, ESGIC) -40 mg per tablet       zolpidem (AMBIEN) 10 mg tablet TAKE ONE TABLET BY MOUTH EVERY NIGHT AT BEDTIME AS NEEDED FOR SLEEP *MAXIMUM OF 1 TABLET DAILY* 90 Tablet 0    budesonide/glycopyr/formoterol (BREZTRI AEROSPHERE IN) Take  by inhalation.  multivitamin (ONE A DAY) tablet Take 1 Tab by mouth daily.  fexofenadine-pseudoephedrine (Allegra-D 12 Hour)  mg Tb12 Take 1 Tab by mouth every twelve (12) hours.       budesonide-formoteroL (Symbicort) 160-4.5 mcg/actuation HFAA Take 2 Puffs by inhalation.  HYDROcodone-acetaminophen (NORCO) 5-325 mg per tablet Take 2 Tabs by mouth every four (4) hours as needed for Pain.  albuterol (PROVENTIL HFA, VENTOLIN HFA, PROAIR HFA) 90 mcg/actuation inhaler Take 2 Puffs by inhalation every four (4) hours as needed for Wheezing. Allergies   Allergen Reactions    Prednisone Other (comments)    Amoxicillin Rash    Azithromycin Rash       ROS:   Complete review of systems was reviewed with pertinent information listed in HPI. Review of Systems   Constitutional: Negative. HENT: Negative. Respiratory: Negative. Cardiovascular: Negative. Genitourinary: Negative. Musculoskeletal: Positive for joint pain. Psychiatric/Behavioral: Positive for depression. Objective:     Visit Vitals  /71 (BP 1 Location: Left upper arm, BP Patient Position: Sitting, BP Cuff Size: Large adult)   Pulse 94   Temp 98 °F (36.7 °C) (Temporal)   Resp 16   Ht 5' 6\" (1.676 m)   Wt 296 lb (134.3 kg)   SpO2 100%   BMI 47.78 kg/m²       Vitals and Nurse Documentation reviewed. Physical Exam  Constitutional:       Appearance: Normal appearance. She is obese. HENT:      Mouth/Throat:      Mouth: Mucous membranes are moist.   Cardiovascular:      Rate and Rhythm: Normal rate and regular rhythm. Pulses: Normal pulses. Heart sounds: Normal heart sounds. Pulmonary:      Effort: Pulmonary effort is normal.      Breath sounds: Normal breath sounds. Abdominal:      General: Bowel sounds are normal.      Palpations: Abdomen is soft. Musculoskeletal:         General: Tenderness present. Cervical back: Normal range of motion and neck supple. Comments: Multiple joint pain   Neurological:      Mental Status: She is alert. Assessment/Plan:     Diagnoses and all orders for this visit:    1.  Morbid obesity with BMI of 45.0-49.9, adult [facility])    A letter give in support of her having Bariatric surgery    2. Insomnia, unspecified type    3. Other depression    4.  Arthralgia, unspecified joint  Continue current medication  Follow up as needed        Pt expressed understanding with the diagnosis and plan        Discussed expected course/resolution/complications of diagnosis in detail with patient.    Medication risks/benefits/costs/interactions/alternatives discussed with patient.    Pt was given an after visit summary which includes diagnoses, current medications & vitals.  Pt expressed understanding with the diagnosis and plan

## 2022-03-11 NOTE — PROGRESS NOTES
Chief Complaint   Patient presents with    Form Completion       1. \"Have you been to the ER, urgent care clinic since your last visit? Hospitalized since your last visit? \" No    2. \"Have you seen or consulted any other health care providers outside of the [address] [place] [address] since your last visit? \" Yes When: 03/2022 Where: Dr. Melania Shah  Reason for visit: letter of support     3. For patients aged 39-70: Has the patient had a colonoscopy / FIT/ Cologuard? No      If the patient is female:    4. For patients aged 41-77: Has the patient had a mammogram within the past 2 years? Yes - no Care Gap present      5. For patients aged 21-65: Has the patient had a pap smear?  Yes - no Care Gap present    3 most recent PHQ Screens 3/11/2022   PHQ Not Done Patient refuses   Little interest or pleasure in doing things -   Feeling down, depressed, irritable, or hopeless -   Total Score PHQ 2 -   Trouble falling or staying asleep, or sleeping too much -   Feeling tired or having little energy -   Poor appetite, weight loss, or overeating -   Feeling bad about yourself - or that you are a failure or have let yourself or your family down -   Trouble concentrating on things such as school, work, reading, or watching TV -   Moving or speaking so slowly that other people could have noticed; or the opposite being so fidgety that others notice -   Thoughts of being better off dead, or hurting yourself in some way -   PHQ 9 Score -   How difficult have these problems made it for you to do your work, take care of your home and get along with others -

## 2022-03-11 NOTE — LETTER
3/11/2022 8:35 AM    Ms. Deacon Robles  [place] [id number]        Dear Fellow Colleague:     I have been caring for Ms. Deacon Robles (1965) for two years. She has struggled with multiple attempts at weight loss by diet and exercise methods of SlimFast and Nutrisystem. She has the following co-morbidities strongly associated with morbid obesity which I will feel only be resolved by undergoing weight loss surgery: Asthma, fibromyalgia, depression, insomnia, and arthritis. I am writing this letter in full support of weight reduction surgery and feel that this patient is suitable for this procedure and this patient is cleared to proceed with bariatric surgery.            Sincerely,      Jailyn Webber NP

## 2022-03-14 RX ORDER — CYCLOBENZAPRINE HCL 5 MG
TABLET ORAL
Qty: 90 TABLET | Refills: 0 | Status: SHIPPED
Start: 2022-03-14 | End: 2022-06-15

## 2022-03-18 PROBLEM — M79.7 FIBROMYALGIA: Status: ACTIVE | Noted: 2020-09-29

## 2022-03-19 PROBLEM — G56.10 MEDIAN NERVE NEUROPATHY: Status: ACTIVE | Noted: 2021-07-30

## 2022-03-19 PROBLEM — E66.01 SEVERE OBESITY (HCC): Status: ACTIVE | Noted: 2020-10-23

## 2022-03-20 PROBLEM — F33.2 DEPRESSION, MAJOR, SEVERE RECURRENCE (HCC): Status: ACTIVE | Noted: 2020-11-25

## 2022-03-24 DIAGNOSIS — M25.50 ARTHRALGIA, UNSPECIFIED JOINT: ICD-10-CM

## 2022-03-25 RX ORDER — DICLOFENAC SODIUM 75 MG/1
TABLET, DELAYED RELEASE ORAL
Qty: 30 TABLET | Refills: 0 | Status: SHIPPED | OUTPATIENT
Start: 2022-03-25 | End: 2022-04-25 | Stop reason: SDUPTHER

## 2022-04-06 DIAGNOSIS — K44.9 HIATAL HERNIA: ICD-10-CM

## 2022-04-06 RX ORDER — PANTOPRAZOLE SODIUM 40 MG/1
40 TABLET, DELAYED RELEASE ORAL DAILY
Qty: 90 TABLET | Refills: 1 | Status: SHIPPED | OUTPATIENT
Start: 2022-04-06

## 2022-04-06 NOTE — TELEPHONE ENCOUNTER
Last Visit: 3/11/22 VIOLETA Mendez  Next Appointment: Not scheduled  Previous Refill Encounter(s): 3/8/22 30    Requested Prescriptions     Pending Prescriptions Disp Refills    pantoprazole (PROTONIX) 40 mg tablet 90 Tablet 1     Sig: Take 1 Tablet by mouth daily.

## 2022-04-25 DIAGNOSIS — M25.50 ARTHRALGIA, UNSPECIFIED JOINT: ICD-10-CM

## 2022-04-25 NOTE — TELEPHONE ENCOUNTER
Chief Complaint   Patient presents with    Medication Refill     diclofenac 75 mg tablet      Patient last seen on 3/11/2022.   Lilian Cantu LPN

## 2022-04-26 RX ORDER — DICLOFENAC SODIUM 75 MG/1
75 TABLET, DELAYED RELEASE ORAL 2 TIMES DAILY
Qty: 30 TABLET | Refills: 0 | Status: SHIPPED | OUTPATIENT
Start: 2022-04-26 | End: 2022-05-17 | Stop reason: SDUPTHER

## 2022-05-17 DIAGNOSIS — M25.50 ARTHRALGIA, UNSPECIFIED JOINT: ICD-10-CM

## 2022-05-17 RX ORDER — DICLOFENAC SODIUM 75 MG/1
75 TABLET, DELAYED RELEASE ORAL 2 TIMES DAILY
Qty: 30 TABLET | Refills: 0 | Status: SHIPPED | OUTPATIENT
Start: 2022-05-17 | End: 2022-06-14 | Stop reason: SDUPTHER

## 2022-05-17 NOTE — TELEPHONE ENCOUNTER
Last Visit: 3/11/22 VIOLETA Mendez  Next Appointment: 5/18/22 (Patient cancelled- not rescheduled)  Previous Refill Encounter(s): 4/26/22 30    Requested Prescriptions     Pending Prescriptions Disp Refills    diclofenac EC (VOLTAREN) 75 mg EC tablet 30 Tablet 0     Sig: Take 1 Tablet by mouth two (2) times a day.

## 2022-06-14 DIAGNOSIS — M25.50 ARTHRALGIA, UNSPECIFIED JOINT: ICD-10-CM

## 2022-06-14 RX ORDER — DICLOFENAC SODIUM 75 MG/1
75 TABLET, DELAYED RELEASE ORAL 2 TIMES DAILY
Qty: 30 TABLET | Refills: 1 | Status: SHIPPED
Start: 2022-06-14 | End: 2022-10-19

## 2022-06-14 NOTE — TELEPHONE ENCOUNTER
Last Visit: 3/11/22 VIOLETA Mendez  Next Appointment: Tomorrow- 6/15/22 VIOLETA Rubio  Previous Refill Encounter(s): 5/17/22 30    Requested Prescriptions     Pending Prescriptions Disp Refills    diclofenac EC (VOLTAREN) 75 mg EC tablet 30 Tablet 1     Sig: Take 1 Tablet by mouth two (2) times a day.      For Elia Brantley in place:    Recommendation Provided To:    Intervention Detail: New Rx: 1, reason: Patient Preference   Gap Closed?:    Intervention Accepted By:   Humphrey Kaplan Time Spent (min): 1

## 2022-06-15 ENCOUNTER — OFFICE VISIT (OUTPATIENT)
Dept: FAMILY MEDICINE CLINIC | Age: 57
End: 2022-06-15
Payer: COMMERCIAL

## 2022-06-15 VITALS
HEART RATE: 70 BPM | BODY MASS INDEX: 45.51 KG/M2 | HEIGHT: 66 IN | DIASTOLIC BLOOD PRESSURE: 66 MMHG | SYSTOLIC BLOOD PRESSURE: 103 MMHG | RESPIRATION RATE: 16 BRPM | WEIGHT: 283.2 LBS | OXYGEN SATURATION: 100 % | TEMPERATURE: 97.5 F

## 2022-06-15 DIAGNOSIS — R10.30 LOWER ABDOMINAL PAIN: Primary | ICD-10-CM

## 2022-06-15 DIAGNOSIS — D50.8 OTHER IRON DEFICIENCY ANEMIA: ICD-10-CM

## 2022-06-15 DIAGNOSIS — E66.01 MORBID OBESITY WITH BMI OF 45.0-49.9, ADULT (HCC): ICD-10-CM

## 2022-06-15 LAB
BILIRUB UR QL STRIP: NORMAL
GLUCOSE UR-MCNC: NEGATIVE MG/DL
KETONES P FAST UR STRIP-MCNC: NEGATIVE MG/DL
PH UR STRIP: 7.5 [PH] (ref 4.6–8)
PROT UR QL STRIP: NORMAL
SP GR UR STRIP: 1.01 (ref 1–1.03)
UA UROBILINOGEN AMB POC: NORMAL (ref 0.2–1)
URINALYSIS CLARITY POC: CLEAR
URINALYSIS COLOR POC: YELLOW
URINE BLOOD POC: NEGATIVE
URINE LEUKOCYTES POC: NEGATIVE
URINE NITRITES POC: NEGATIVE

## 2022-06-15 PROCEDURE — 99214 OFFICE O/P EST MOD 30 MIN: CPT | Performed by: NURSE PRACTITIONER

## 2022-06-15 PROCEDURE — 81001 URINALYSIS AUTO W/SCOPE: CPT | Performed by: NURSE PRACTITIONER

## 2022-06-15 RX ORDER — LORAZEPAM 0.5 MG/1
TABLET ORAL
COMMUNITY
Start: 2022-05-31

## 2022-06-15 NOTE — PROGRESS NOTES
Chief Complaint   Patient presents with    Abdominal Pain     lower abdominal pain getting worst     1. Have you been to the ER, urgent care clinic since your last visit? Hospitalized since your last visit? No    2. Have you seen or consulted any other health care providers outside of the [address] [place] [address] since your last visit? Include any pap smears or colon screening.  No

## 2022-06-15 NOTE — PROGRESS NOTES
[facility] Note  Subjective:      Krish Enciso is a 49 y.o. female who presents for lower abdominal pain x three months, she describes pain as intermittent, sharp, better with taking Ibuprofen, doesn't know contributing factors, rates pain 10/10 when having it. Her urine in clear for  Infection. She not sexually active and denies abnormal vaginal discharge. She has had C- section, and believers the pain might have been coming from incisions in her lower abdomen. Denies N/V/D/C or bloody stools    Past Medical History:   Diagnosis Date    Arthritis     Asthma     Depression     Heart palpitations     Heart palpitations     Hiatal hernia     Hiatal hernia     Insomnia     Joint pain     Morbid obesity with BMI of 40.0-44.9, adult (HCC)     Morbid obesity with BMI of 45.0-49.9, adult ([facility])      Past Surgical History:   Procedure Laterality Date    HX  SECTION      HX CHOLECYSTECTOMY      HX KNEE ARTHROSCOPY      HX RHINOPLASTY         Current Outpatient Medications   Medication Sig Dispense Refill    LORazepam (ATIVAN) 0.5 mg tablet       diclofenac EC (VOLTAREN) 75 mg EC tablet Take 1 Tablet by mouth two (2) times a day. 30 Tablet 1    pantoprazole (PROTONIX) 40 mg tablet Take 1 Tablet by mouth daily. 90 Tablet 1    fluticasone propionate (FLONASE) 50 mcg/actuation nasal spray SPRAY 2 SPRAYS IN EACH NOSTRIL ONCE DAILY 1 Each 3    montelukast (SINGULAIR) 10 mg tablet TAKE ONE TABLET BY MOUTH DAILY 30 Tablet 3    topiramate (TOPAMAX) 50 mg tablet       metoprolol succinate (TOPROL-XL) 50 mg XL tablet Take 1.5 Tablets by mouth daily. 135 Tablet 1    zolpidem (AMBIEN) 10 mg tablet TAKE ONE TABLET BY MOUTH EVERY NIGHT AT BEDTIME AS NEEDED FOR SLEEP *MAXIMUM OF 1 TABLET DAILY* 90 Tablet 0    budesonide/glycopyr/formoterol (BREZTRI AEROSPHERE IN) Take  by inhalation.  multivitamin (ONE A DAY) tablet Take 1 Tab by mouth daily.       fexofenadine-pseudoephedrine (Allegra-D 12 Hour)  mg Tb12 Take 1 Tab by mouth every twelve (12) hours.  budesonide-formoteroL (Symbicort) 160-4.5 mcg/actuation HFAA Take 2 Puffs by inhalation.  HYDROcodone-acetaminophen (NORCO) 5-325 mg per tablet Take 2 Tabs by mouth every four (4) hours as needed for Pain.  albuterol (PROVENTIL HFA, VENTOLIN HFA, PROAIR HFA) 90 mcg/actuation inhaler Take 2 Puffs by inhalation every four (4) hours as needed for Wheezing. Allergies   Allergen Reactions    Prednisone Other (comments)    Amoxicillin Rash    Azithromycin Rash       ROS:   Complete review of systems was reviewed with pertinent information listed in HPI. Review of Systems   Constitutional: Negative. HENT: Negative. Respiratory: Negative. Cardiovascular: Negative. Gastrointestinal: Positive for abdominal pain. Negative for blood in stool, constipation, diarrhea, heartburn, melena, nausea and vomiting. Genitourinary: Negative. Musculoskeletal: Negative. Objective:     Visit Vitals  /66 (BP 1 Location: Left arm, BP Patient Position: Sitting, BP Cuff Size: Adult long)   Pulse 70   Temp 97.5 °F (36.4 °C) (Temporal)   Resp 16   Ht 5' 6\" (1.676 m)   Wt 283 lb 3.2 oz (128.5 kg)   SpO2 100%   BMI 45.71 kg/m²       Vitals and Nurse Documentation reviewed. Physical Exam  Constitutional:       Appearance: Normal appearance. She is obese. HENT:      Mouth/Throat:      Mouth: Mucous membranes are moist.   Cardiovascular:      Rate and Rhythm: Normal rate and regular rhythm. Pulses: Normal pulses. Heart sounds: Normal heart sounds. No murmur heard. Pulmonary:      Breath sounds: Normal breath sounds. Abdominal:      General: Bowel sounds are normal.      Palpations: Abdomen is soft. Comments: Currently denies pain   Musculoskeletal:      Cervical back: Normal range of motion and neck supple. Neurological:      Mental Status: She is alert. Psychiatric:         Mood and Affect: Mood normal.         Thought Content: Thought content normal.         Assessment/Plan:     Diagnoses and all orders for this visit:    1. Lower abdominal pain  -     METABOLIC PANEL, COMPREHENSIVE; Future  -     AMB POC URINALYSIS DIP STICK AUTO W/ MICRO  -     US ABD COMP; Future    2. Other iron deficiency anemia  -     CBC W/O DIFF; Future    3.  Morbid obesity with BMI of 45.0-49.9, adult ([facility] [address]          Pt expressed understanding with the diagnosis and plan        Discussed expected course/resolution/complications of diagnosis in detail with patient.    Medication risks/benefits/costs/interactions/alternatives discussed with patient.    Pt was given an after visit summary which includes diagnoses, current medications & vitals.  Pt expressed understanding with the diagnosis and plan

## 2022-06-16 LAB
ALBUMIN SERPL-MCNC: 3.9 G/DL (ref 3.5–5)
ALBUMIN/GLOB SERPL: 1.1 {RATIO} (ref 1.1–2.2)
ALP SERPL-CCNC: 99 U/L (ref 45–117)
ALT SERPL-CCNC: 16 U/L (ref 12–78)
ANION GAP SERPL CALC-SCNC: 4 MMOL/L (ref 5–15)
AST SERPL-CCNC: 14 U/L (ref 15–37)
BILIRUB SERPL-MCNC: 0.5 MG/DL (ref 0.2–1)
BUN SERPL-MCNC: 19 MG/DL (ref 6–20)
BUN/CREAT SERPL: 23 (ref 12–20)
CALCIUM SERPL-MCNC: 9.8 MG/DL (ref 8.5–10.1)
CHLORIDE SERPL-SCNC: 110 MMOL/L (ref 97–108)
CO2 SERPL-SCNC: 27 MMOL/L (ref 21–32)
CREAT SERPL-MCNC: 0.82 MG/DL (ref 0.55–1.02)
ERYTHROCYTE [DISTWIDTH] IN BLOOD BY AUTOMATED COUNT: 14.5 % (ref 11.5–14.5)
GLOBULIN SER CALC-MCNC: 3.6 G/DL (ref 2–4)
GLUCOSE SERPL-MCNC: 108 MG/DL (ref 65–100)
HCT VFR BLD AUTO: 43.9 % (ref 35–47)
HGB BLD-MCNC: 12.9 G/DL (ref 11.5–16)
MCH RBC QN AUTO: 28.5 PG (ref 26–34)
MCHC RBC AUTO-ENTMCNC: 29.4 G/DL (ref 30–36.5)
MCV RBC AUTO: 96.9 FL (ref 80–99)
NRBC # BLD: 0 K/UL (ref 0–0.01)
NRBC BLD-RTO: 0 PER 100 WBC
PLATELET # BLD AUTO: 396 K/UL (ref 150–400)
PMV BLD AUTO: 9.3 FL (ref 8.9–12.9)
POTASSIUM SERPL-SCNC: 4.8 MMOL/L (ref 3.5–5.1)
PROT SERPL-MCNC: 7.5 G/DL (ref 6.4–8.2)
RBC # BLD AUTO: 4.53 M/UL (ref 3.8–5.2)
SODIUM SERPL-SCNC: 141 MMOL/L (ref 136–145)
WBC # BLD AUTO: 4.9 K/UL (ref 3.6–11)

## 2022-06-16 NOTE — TELEPHONE ENCOUNTER
Patient called stated she need refill on cyclobenzaprine (flexeril) 5 mg.     Requesting a call back    Best call back #[phone]

## 2022-06-16 NOTE — TELEPHONE ENCOUNTER
VIOLETA Mendez,    Call from patient for refill of flexeril 5mg tabs. Noted she had OV yesterday and stated she wasn't taking. Re-entered as previously prescribed if appropriate. Thanks, Telma Sebastian    Last Visit: 6/15/22 VIOLETA Mendez  Next Appointment: None  Previous Refill Encounter(s): 3/14/22 90    Requested Prescriptions     Pending Prescriptions Disp Refills    cyclobenzaprine (FLEXERIL) 5 mg tablet 90 Tablet 0     Sig: Take 1 Tablet by mouth nightly.      For Elia Brantley in place:    Recommendation Provided To:    Intervention Detail: New Rx: 1, reason: Patient Preference   Gap Closed?:    Intervention Accepted By:   Lilia Time Spent (min): 5

## 2022-06-17 RX ORDER — CYCLOBENZAPRINE HCL 5 MG
5 TABLET ORAL
Qty: 90 TABLET | Refills: 0 | Status: SHIPPED | OUTPATIENT
Start: 2022-06-17 | End: 2022-09-16 | Stop reason: SDUPTHER

## 2022-06-23 ENCOUNTER — TELEPHONE (OUTPATIENT)
Dept: FAMILY MEDICINE CLINIC | Age: 57
End: 2022-06-23

## 2022-06-23 ENCOUNTER — HOSPITAL ENCOUNTER (OUTPATIENT)
Dept: ULTRASOUND IMAGING | Age: 57
Discharge: HOME | End: 2022-06-23

## 2022-06-23 DIAGNOSIS — R10.30 LOWER ABDOMINAL PAIN: ICD-10-CM

## 2022-06-23 DIAGNOSIS — R10.30 LOWER ABDOMINAL PAIN: Primary | ICD-10-CM

## 2022-06-23 NOTE — TELEPHONE ENCOUNTER
Patient called stated she went to do ultrasound of pelvic and the order read ultrasound of abdomen she want provider to send new order stating in pelvic.     Best call back #[phone]

## 2022-06-30 ENCOUNTER — HOSPITAL ENCOUNTER (OUTPATIENT)
Dept: ULTRASOUND IMAGING | Age: 57
Discharge: HOME | End: 2022-06-30

## 2022-06-30 ENCOUNTER — APPOINTMENT (OUTPATIENT)
Dept: ULTRASOUND IMAGING | Age: 57
End: 2022-06-30

## 2022-06-30 DIAGNOSIS — R10.30 LOWER ABDOMINAL PAIN: ICD-10-CM

## 2022-07-01 ENCOUNTER — HOSPITAL ENCOUNTER (OUTPATIENT)
Dept: ULTRASOUND IMAGING | Age: 57
Discharge: HOME | End: 2022-07-01
Payer: MEDICAID

## 2022-07-01 DIAGNOSIS — R10.30 LOWER ABDOMINAL PAIN: ICD-10-CM

## 2022-07-01 PROCEDURE — 76856 US EXAM PELVIC COMPLETE: CPT

## 2022-07-01 PROCEDURE — 76830 TRANSVAGINAL US NON-OB: CPT

## 2022-07-21 ENCOUNTER — APPOINTMENT (OUTPATIENT)
Dept: CT IMAGING | Age: 57
End: 2022-07-21
Attending: EMERGENCY MEDICINE
Payer: MEDICAID

## 2022-07-21 ENCOUNTER — HOSPITAL ENCOUNTER (OUTPATIENT)
Age: 57
Setting detail: OBSERVATION
Discharge: HOME | End: 2022-07-22
Attending: EMERGENCY MEDICINE | Admitting: FAMILY MEDICINE
Payer: MEDICAID

## 2022-07-21 ENCOUNTER — APPOINTMENT (OUTPATIENT)
Dept: MRI IMAGING | Age: 57
End: 2022-07-21
Attending: FAMILY MEDICINE
Payer: MEDICAID

## 2022-07-21 DIAGNOSIS — G45.9 TIA (TRANSIENT ISCHEMIC ATTACK): Primary | ICD-10-CM

## 2022-07-21 DIAGNOSIS — R53.1 LEFT-SIDED WEAKNESS: ICD-10-CM

## 2022-07-21 LAB
ALBUMIN SERPL-MCNC: 3 G/DL (ref 3.5–5)
ALBUMIN/GLOB SERPL: 0.8 {RATIO} (ref 1.1–2.2)
ALP SERPL-CCNC: 80 U/L (ref 45–117)
ALT SERPL-CCNC: 12 U/L (ref 12–78)
ANION GAP SERPL CALC-SCNC: 6 MMOL/L (ref 5–15)
APPEARANCE UR: CLEAR
AST SERPL-CCNC: 10 U/L (ref 15–37)
ATRIAL RATE: 73 BPM
BASOPHILS # BLD: 0.1 K/UL (ref 0–0.1)
BASOPHILS NFR BLD: 1 % (ref 0–1)
BILIRUB SERPL-MCNC: 0.5 MG/DL (ref 0.2–1)
BILIRUB UR QL: NEGATIVE
BUN SERPL-MCNC: 12 MG/DL (ref 6–20)
BUN/CREAT SERPL: 15 (ref 12–20)
CALCIUM SERPL-MCNC: 8.4 MG/DL (ref 8.5–10.1)
CALCULATED P AXIS, ECG09: 60 DEGREES
CALCULATED R AXIS, ECG10: 22 DEGREES
CALCULATED T AXIS, ECG11: 40 DEGREES
CHLORIDE SERPL-SCNC: 112 MMOL/L (ref 97–108)
CK SERPL-CCNC: 106 U/L (ref 26–192)
CK SERPL-CCNC: 112 U/L (ref 26–192)
CO2 SERPL-SCNC: 23 MMOL/L (ref 21–32)
COLOR UR: NORMAL
COMMENT, HOLDF: NORMAL
CREAT SERPL-MCNC: 0.79 MG/DL (ref 0.55–1.02)
DIAGNOSIS, 93000: NORMAL
DIFFERENTIAL METHOD BLD: ABNORMAL
EOSINOPHIL # BLD: 0.2 K/UL (ref 0–0.4)
EOSINOPHIL NFR BLD: 3 % (ref 0–7)
ERYTHROCYTE [DISTWIDTH] IN BLOOD BY AUTOMATED COUNT: 13.2 % (ref 11.5–14.5)
GLOBULIN SER CALC-MCNC: 3.6 G/DL (ref 2–4)
GLUCOSE SERPL-MCNC: 175 MG/DL (ref 65–100)
GLUCOSE UR STRIP.AUTO-MCNC: NEGATIVE MG/DL
HCT VFR BLD AUTO: 33.3 % (ref 35–47)
HGB BLD-MCNC: 10.6 G/DL (ref 11.5–16)
HGB UR QL STRIP: NEGATIVE
IMM GRANULOCYTES # BLD AUTO: 0 K/UL (ref 0–0.04)
IMM GRANULOCYTES NFR BLD AUTO: 0 % (ref 0–0.5)
INR PPP: 1 (ref 0.9–1.1)
KETONES UR QL STRIP.AUTO: NEGATIVE MG/DL
LEUKOCYTE ESTERASE UR QL STRIP.AUTO: NEGATIVE
LYMPHOCYTES # BLD: 2.4 K/UL (ref 0.8–3.5)
LYMPHOCYTES NFR BLD: 43 % (ref 12–49)
MCH RBC QN AUTO: 29.2 PG (ref 26–34)
MCHC RBC AUTO-ENTMCNC: 31.8 G/DL (ref 30–36.5)
MCV RBC AUTO: 91.7 FL (ref 80–99)
MONOCYTES # BLD: 0.8 K/UL (ref 0–1)
MONOCYTES NFR BLD: 14 % (ref 5–13)
NEUTS SEG # BLD: 2.1 K/UL (ref 1.8–8)
NEUTS SEG NFR BLD: 39 % (ref 32–75)
NITRITE UR QL STRIP.AUTO: NEGATIVE
NRBC # BLD: 0 K/UL (ref 0–0.01)
NRBC BLD-RTO: 0 PER 100 WBC
P-R INTERVAL, ECG05: 160 MS
PH UR STRIP: 7 [PH] (ref 5–8)
PLATELET # BLD AUTO: 264 K/UL (ref 150–400)
PMV BLD AUTO: 8 FL (ref 8.9–12.9)
POTASSIUM SERPL-SCNC: 3.5 MMOL/L (ref 3.5–5.1)
PROT SERPL-MCNC: 6.6 G/DL (ref 6.4–8.2)
PROT UR STRIP-MCNC: NEGATIVE MG/DL
PROTHROMBIN TIME: 10.6 SEC (ref 9–11.1)
Q-T INTERVAL, ECG07: 416 MS
QRS DURATION, ECG06: 98 MS
QTC CALCULATION (BEZET), ECG08: 458 MS
RBC # BLD AUTO: 3.63 M/UL (ref 3.8–5.2)
SAMPLES BEING HELD,HOLD: NORMAL
SODIUM SERPL-SCNC: 141 MMOL/L (ref 136–145)
SP GR UR REFRACTOMETRY: 1.02 (ref 1–1.03)
TROPONIN-HIGH SENSITIVITY: 4 NG/L (ref 0–51)
TROPONIN-HIGH SENSITIVITY: <4 NG/L (ref 0–51)
UR CULT HOLD, URHOLD: NORMAL
UROBILINOGEN UR QL STRIP.AUTO: 0.2 EU/DL (ref 0.2–1)
VENTRICULAR RATE, ECG03: 73 BPM
WBC # BLD AUTO: 5.5 K/UL (ref 3.6–11)

## 2022-07-21 PROCEDURE — 36415 COLL VENOUS BLD VENIPUNCTURE: CPT

## 2022-07-21 PROCEDURE — 94640 AIRWAY INHALATION TREATMENT: CPT

## 2022-07-21 PROCEDURE — 94664 DEMO&/EVAL PT USE INHALER: CPT

## 2022-07-21 PROCEDURE — 74011250637 HC RX REV CODE- 250/637: Performed by: FAMILY MEDICINE

## 2022-07-21 PROCEDURE — 80053 COMPREHEN METABOLIC PANEL: CPT

## 2022-07-21 PROCEDURE — 84484 ASSAY OF TROPONIN QUANT: CPT

## 2022-07-21 PROCEDURE — 70450 CT HEAD/BRAIN W/O DYE: CPT

## 2022-07-21 PROCEDURE — 74011250636 HC RX REV CODE- 250/636: Performed by: FAMILY MEDICINE

## 2022-07-21 PROCEDURE — 99285 EMERGENCY DEPT VISIT HI MDM: CPT

## 2022-07-21 PROCEDURE — 74011000250 HC RX REV CODE- 250: Performed by: FAMILY MEDICINE

## 2022-07-21 PROCEDURE — 74011250637 HC RX REV CODE- 250/637: Performed by: NURSE PRACTITIONER

## 2022-07-21 PROCEDURE — 85025 COMPLETE CBC W/AUTO DIFF WBC: CPT

## 2022-07-21 PROCEDURE — 85610 PROTHROMBIN TIME: CPT

## 2022-07-21 PROCEDURE — APPNB30 APP NON BILLABLE TIME 0-30 MINS: Performed by: NURSE PRACTITIONER

## 2022-07-21 PROCEDURE — 70496 CT ANGIOGRAPHY HEAD: CPT

## 2022-07-21 PROCEDURE — 93005 ELECTROCARDIOGRAM TRACING: CPT

## 2022-07-21 PROCEDURE — 81003 URINALYSIS AUTO W/O SCOPE: CPT

## 2022-07-21 PROCEDURE — G0378 HOSPITAL OBSERVATION PER HR: HCPCS

## 2022-07-21 PROCEDURE — 70551 MRI BRAIN STEM W/O DYE: CPT

## 2022-07-21 PROCEDURE — 82550 ASSAY OF CK (CPK): CPT

## 2022-07-21 PROCEDURE — 0042T CT CODE NEURO PERF W CBF: CPT

## 2022-07-21 PROCEDURE — 74011000636 HC RX REV CODE- 636: Performed by: RADIOLOGY

## 2022-07-21 PROCEDURE — 94761 N-INVAS EAR/PLS OXIMETRY MLT: CPT

## 2022-07-21 RX ORDER — FAMOTIDINE 20 MG/1
20 TABLET, FILM COATED ORAL EVERY 12 HOURS
Status: DISCONTINUED | OUTPATIENT
Start: 2022-07-21 | End: 2022-07-22 | Stop reason: HOSPADM

## 2022-07-21 RX ORDER — TOPIRAMATE 25 MG/1
50 TABLET ORAL 2 TIMES DAILY
Status: DISCONTINUED | OUTPATIENT
Start: 2022-07-21 | End: 2022-07-22 | Stop reason: HOSPADM

## 2022-07-21 RX ORDER — SODIUM CHLORIDE 9 MG/ML
75 INJECTION, SOLUTION INTRAVENOUS CONTINUOUS
Status: DISPENSED | OUTPATIENT
Start: 2022-07-21 | End: 2022-07-22

## 2022-07-21 RX ORDER — THERA TABS 400 MCG
1 TAB ORAL DAILY
Status: DISCONTINUED | OUTPATIENT
Start: 2022-07-22 | End: 2022-07-22 | Stop reason: HOSPADM

## 2022-07-21 RX ORDER — LORAZEPAM 0.5 MG/1
0.5 TABLET ORAL 2 TIMES DAILY
Status: DISCONTINUED | OUTPATIENT
Start: 2022-07-21 | End: 2022-07-22 | Stop reason: HOSPADM

## 2022-07-21 RX ORDER — ATORVASTATIN CALCIUM 40 MG/1
80 TABLET, FILM COATED ORAL
Status: DISCONTINUED | OUTPATIENT
Start: 2022-07-21 | End: 2022-07-22

## 2022-07-21 RX ORDER — PANTOPRAZOLE SODIUM 40 MG/1
40 TABLET, DELAYED RELEASE ORAL DAILY
Status: DISCONTINUED | OUTPATIENT
Start: 2022-07-22 | End: 2022-07-22 | Stop reason: HOSPADM

## 2022-07-21 RX ORDER — CYCLOBENZAPRINE HCL 10 MG
5 TABLET ORAL
Status: DISCONTINUED | OUTPATIENT
Start: 2022-07-21 | End: 2022-07-22 | Stop reason: HOSPADM

## 2022-07-21 RX ORDER — MONTELUKAST SODIUM 5 MG/1
10 TABLET, CHEWABLE ORAL DAILY
Status: DISCONTINUED | OUTPATIENT
Start: 2022-07-22 | End: 2022-07-21

## 2022-07-21 RX ORDER — BUDESONIDE 0.5 MG/2ML
500 INHALANT ORAL
Status: DISCONTINUED | OUTPATIENT
Start: 2022-07-21 | End: 2022-07-22 | Stop reason: HOSPADM

## 2022-07-21 RX ORDER — LANOLIN ALCOHOL/MO/W.PET/CERES
3 CREAM (GRAM) TOPICAL
Status: DISCONTINUED | OUTPATIENT
Start: 2022-07-21 | End: 2022-07-22 | Stop reason: HOSPADM

## 2022-07-21 RX ORDER — ACETAMINOPHEN 650 MG/1
650 SUPPOSITORY RECTAL
Status: DISCONTINUED | OUTPATIENT
Start: 2022-07-21 | End: 2022-07-22 | Stop reason: HOSPADM

## 2022-07-21 RX ORDER — GUAIFENESIN 100 MG/5ML
81 LIQUID (ML) ORAL DAILY
Status: DISCONTINUED | OUTPATIENT
Start: 2022-07-22 | End: 2022-07-22 | Stop reason: HOSPADM

## 2022-07-21 RX ORDER — ACETAMINOPHEN 325 MG/1
650 TABLET ORAL
Status: DISCONTINUED | OUTPATIENT
Start: 2022-07-21 | End: 2022-07-22 | Stop reason: HOSPADM

## 2022-07-21 RX ORDER — METOPROLOL SUCCINATE 50 MG/1
75 TABLET, EXTENDED RELEASE ORAL DAILY
Status: DISCONTINUED | OUTPATIENT
Start: 2022-07-22 | End: 2022-07-22 | Stop reason: HOSPADM

## 2022-07-21 RX ORDER — ARFORMOTEROL TARTRATE 15 UG/2ML
15 SOLUTION RESPIRATORY (INHALATION)
Status: DISCONTINUED | OUTPATIENT
Start: 2022-07-21 | End: 2022-07-22 | Stop reason: HOSPADM

## 2022-07-21 RX ORDER — BUDESONIDE AND FORMOTEROL FUMARATE DIHYDRATE 160; 4.5 UG/1; UG/1
2 AEROSOL RESPIRATORY (INHALATION)
Status: DISCONTINUED | OUTPATIENT
Start: 2022-07-21 | End: 2022-07-21 | Stop reason: CLARIF

## 2022-07-21 RX ORDER — MONTELUKAST SODIUM 5 MG/1
10 TABLET, CHEWABLE ORAL DAILY
Status: DISCONTINUED | OUTPATIENT
Start: 2022-07-22 | End: 2022-07-22 | Stop reason: HOSPADM

## 2022-07-21 RX ADMIN — ARFORMOTEROL TARTRATE 15 MCG: 15 SOLUTION RESPIRATORY (INHALATION) at 23:57

## 2022-07-21 RX ADMIN — LORAZEPAM 0.5 MG: 0.5 TABLET ORAL at 18:07

## 2022-07-21 RX ADMIN — MONTELUKAST SODIUM 10 MG: 5 TABLET, CHEWABLE ORAL at 23:28

## 2022-07-21 RX ADMIN — FAMOTIDINE 20 MG: 20 TABLET ORAL at 22:14

## 2022-07-21 RX ADMIN — IOPAMIDOL 50 ML: 755 INJECTION, SOLUTION INTRAVENOUS at 12:33

## 2022-07-21 RX ADMIN — IOPAMIDOL 100 ML: 755 INJECTION, SOLUTION INTRAVENOUS at 12:34

## 2022-07-21 RX ADMIN — BUDESONIDE 500 MCG: 0.5 INHALANT RESPIRATORY (INHALATION) at 23:57

## 2022-07-21 RX ADMIN — CYCLOBENZAPRINE 5 MG: 10 TABLET, FILM COATED ORAL at 23:23

## 2022-07-21 RX ADMIN — SODIUM CHLORIDE 75 ML/HR: 9 INJECTION, SOLUTION INTRAVENOUS at 20:44

## 2022-07-21 RX ADMIN — Medication 3 MG: at 23:23

## 2022-07-21 RX ADMIN — ATORVASTATIN CALCIUM 80 MG: 40 TABLET, FILM COATED ORAL at 22:14

## 2022-07-21 RX ADMIN — SODIUM CHLORIDE 75 ML/HR: 9 INJECTION, SOLUTION INTRAVENOUS at 16:28

## 2022-07-21 RX ADMIN — TOPIRAMATE 50 MG: 25 TABLET, FILM COATED ORAL at 22:14

## 2022-07-21 NOTE — PROGRESS NOTES
SLP Contact Note    Consult received and appreciated. Patient chart reviewed. CT negative for acute infarct. Pt passed swallow screen. Will await MRI. However, incidental finding noted on CT of \"asymmetric soft tissue fullness in the right tonsillar fossa with masslike  density measuring 2.5 x 1.5 cm. Recommend ENT consultation and direct  visualization to exclude the possibility of oropharyngeal malignancy. \"  Agree with this as it certainly could impact overall swallow function. SLP will follow for continued work-up and complete evaluation as indicated.       Thank you,  Shelvy Shone, M.Ed, [id number] [facility]  Speech-Language Pathologist

## 2022-07-21 NOTE — CONSULTS
ENT    Called re incidental OP mass unrelated to pt's presenting complaint or reason for admission seen on CTA (admitted for symptoms c/w TIA). Reviewed images. Note soft tissue asymmetry, no obstruction of airway. Can follow up as outpatient for further evaluation and management. Pt can call number below to make appt. Will sign off, call w/ any further questions/concerns.      Oziel Ray MD  [facility] and [address] [place] [address]

## 2022-07-21 NOTE — ROUTINE PROCESS
TRANSFER - OUT REPORT:    Verbal report given to Kale Montoya RN (name) on Suzan Sarah  being transferred to [facility](unit) for routine progression of care       Report consisted of patients Situation, Background, Assessment and   Recommendations(SBAR). Information from the following report(s) SBAR, Kardex, ED Summary, MAR, Accordion, and Dual Neuro Assessment was reviewed with the receiving nurse. Lines:   Peripheral IV 07/21/22 Right Antecubital (Active)   Site Assessment Clean, dry, & intact 07/21/22 1134   Phlebitis Assessment 0 07/21/22 1134   Infiltration Assessment 0 07/21/22 1134   Dressing Status Clean, dry, & intact 07/21/22 1134   Dressing Type Transparent 07/21/22 0910   Hub Color/Line Status Pink 07/21/22 0910   Action Taken Blood drawn 07/21/22 0910   Alcohol Cap Used No 07/21/22 0910        Opportunity for questions and clarification was provided.       Patient transported with:   [employer]

## 2022-07-21 NOTE — ED PROVIDER NOTES
This is a 51-year-old female  With a history of asthma and COPD, morbid obesity and fibromyalgia. She also has a history of depression. She presents by EMS after having developed an episode of generalized sharp electrical pain running throughout her body. She had made something to eat this morning and then developed the symptoms. She was afraid she was going to die and and EMS was called. She has had no shortness of breath, fever or chills, nausea or vomiting or other acute symptomatology. She states that she felt fine yesterday and was feeling fine when she got up this morning. All of the symptoms began only after she had made something to eat and was getting ready to sit down to do the same.   No other acute complaints    Past Medical History:   Diagnosis Date    Arthritis     Asthma     Depression     Heart palpitations     Heart palpitations     Hiatal hernia     Hiatal hernia     Insomnia     Joint pain     Morbid obesity with BMI of 40.0-44.9, adult (HCC)     Morbid obesity with BMI of 45.0-49.9, adult (HCC)        Past Surgical History:   Procedure Laterality Date    HX  SECTION      HX CHOLECYSTECTOMY      HX KNEE ARTHROSCOPY      HX RHINOPLASTY           Family History:   Problem Relation Age of Onset    OSTEOARTHRITIS Mother     Hypertension Mother     Stroke Father     Arthritis-rheumatoid Father     Heart Attack Father        Social History     Socioeconomic History    Marital status: SINGLE     Spouse name: Not on file    Number of children: Not on file    Years of education: Not on file    Highest education level: Not on file   Occupational History    Not on file   Tobacco Use    Smoking status: Never    Smokeless tobacco: Never   Vaping Use    Vaping Use: Never used   Substance and Sexual Activity    Alcohol use: Never    Drug use: Never    Sexual activity: Not Currently   Other Topics Concern    Not on file   Social History Narrative    Not on file     Social Determinants of Health Financial Resource Strain: Not on file   Food Insecurity: Not on file   Transportation Needs: Not on file   Physical Activity: Not on file   Stress: Not on file   Social Connections: Not on file   Intimate Partner Violence: Not on file   Housing Stability: Not on file         ALLERGIES: Prednisone, Amoxicillin, and Azithromycin    Review of Systems   Constitutional:  Negative for activity change, appetite change, chills, fatigue and fever. See HPI   HENT:  Negative for ear pain, facial swelling, sore throat and trouble swallowing. Eyes:  Negative for pain, discharge and visual disturbance. Respiratory:  Negative for chest tightness, shortness of breath and wheezing. Cardiovascular:  Negative for chest pain and palpitations. Gastrointestinal:  Negative for abdominal pain, blood in stool, nausea and vomiting. Genitourinary:  Negative for difficulty urinating, flank pain and hematuria. Musculoskeletal:  Negative for arthralgias, joint swelling, myalgias and neck pain. Generalized electrical type pain going through her body. Skin:  Negative for color change and rash. Neurological:  Negative for dizziness, weakness, numbness and headaches. Hematological:  Negative for adenopathy. Does not bruise/bleed easily. Psychiatric/Behavioral:  Negative for behavioral problems, confusion and sleep disturbance. All other systems reviewed and are negative. Vitals:    07/21/22 0855   BP: 109/67   Pulse: 74   Resp: 16   Temp: 97.5 °F (36.4 °C)   SpO2: 99%            Physical Exam  Vitals and nursing note reviewed. Constitutional:       General: She is not in acute distress. Appearance: She is well-developed. She is obese. She is not ill-appearing or diaphoretic. Comments: This is a 28-year-old female who presents with generalized electrical burning and pain in her body that started this morning. Vital signs are as noted. HENT:      Head: Normocephalic and atraumatic. Nose: Nose normal.      Mouth/Throat:      Mouth: Mucous membranes are moist.   Eyes:      General: No scleral icterus. Conjunctiva/sclera: Conjunctivae normal.      Pupils: Pupils are equal, round, and reactive to light. Neck:      Thyroid: No thyromegaly. Vascular: No JVD. Trachea: No tracheal deviation. Comments: No carotid bruits noted. Cardiovascular:      Rate and Rhythm: Normal rate and regular rhythm. Heart sounds: Normal heart sounds. No murmur heard. No friction rub. No gallop. Pulmonary:      Effort: Pulmonary effort is normal. No respiratory distress. Breath sounds: Normal breath sounds. No wheezing or rales. Chest:      Chest wall: No tenderness. Abdominal:      General: Bowel sounds are normal. There is no distension. Palpations: Abdomen is soft. There is no mass. Tenderness: There is no abdominal tenderness. There is no guarding or rebound. Musculoskeletal:         General: No tenderness. Normal range of motion. Cervical back: Normal range of motion and neck supple. Lymphadenopathy:      Cervical: No cervical adenopathy. Skin:     General: Skin is warm and dry. Capillary Refill: Capillary refill takes 2 to 3 seconds. Findings: No erythema or rash. Neurological:      Mental Status: She is alert and oriented to person, place, and time. Cranial Nerves: No cranial nerve deficit. Coordination: Coordination normal.      Deep Tendon Reflexes: Reflexes are normal and symmetric. Psychiatric:         Behavior: Behavior normal.         Thought Content:  Thought content normal.         Judgment: Judgment normal.        MDM     Amount and/or Complexity of Data Reviewed  Clinical lab tests: ordered and reviewed    Risk of Complications, Morbidity, and/or Mortality  Presenting problems: moderate  Diagnostic procedures: moderate  Management options: moderate    Patient Progress  Patient progress: stable         Procedures    This is a 37-year-old female who presents with generalized sharp electrical discomfort passing through her body this morning. She has no other acute complaints. She has never had this previously. She does get treated for fibromyalgia. Patient in no acute distress in the ED. She will be evaluated with labs and then reevaluated. 11:46 AM while awaiting lab results, the patient developed some change in speech and weakness in her left arm and leg. The nurses just apprise me of these changes. The patient does have some difference in speech at this point with some slight slurring. She has a weak  on the left and is unable to hold her left arm up spontaneously. She has weakness in the left leg and is unable to hold that up as well. No sensory deficit noted. A Level One code stroke alert is called. The CT scans are ordered and consult with neuro interventional is altered. Patient is alert and oriented x3. Patient was evaluated by telemetry neuro and also by neuro interventional.  Her symptoms have actually improved while that process was occurring. She ultimately ended up getting up and going into the bathroom on her own accord. Her CT and labs are unremarkable. Telemetry neuro is recommending admission for possible TIA work-up. I discussed this with the patient and she is in agreement. At this time her mental status and physical status is back to the baseline when she presented to the ED. Perfect Serve Consult for Admission  1:24 PM    ED Room Number: ER09/09  Patient Name and age:  Harriet Siegel 49 y.o.  female  Working Diagnosis:   1. TIA (transient ischemic attack)        COVID-19 Suspicion:  no  Sepsis present:  no  Reassessment needed: no  Code Status:  Full Code  Readmission: no  Isolation Requirements:  no  Recommended Level of Care:  telemetry  Department:[facility] Adult ED - 21   Other:         Total critical care time spent exclusive of procedures: 30 minutes

## 2022-07-21 NOTE — PROGRESS NOTES
Neurocritical Care Code Stroke Documentation      Symptoms:   Patient was initially evaluated for having an electric shock throughout her body and then she reported when she called 911 she was standing in the middle of the floor and could not move her body for a minute and 30 seconds, but was able to talk to the 911 responder. Around 11:40 am in the ED today she had an acute onset of left-sided weakness and speech changes with stuttering of speech per RN report, so a code stroke was called. Last Known Well: 11:40 am today    Medical hx:   Past Medical History:   Diagnosis Date    Arthritis     Asthma     Depression     Heart palpitations     Heart palpitations     Hiatal hernia     Hiatal hernia     Insomnia     Joint pain     Morbid obesity with BMI of 40.0-44.9, adult ([facility] [address]     Morbid obesity with BMI of 45.0-49.9, adult ([facility])  Fibromyalgia   COPD         Anticoagulation: None    VAN:   Negative   NIHSS:   1a-LOC:0    1b-Month/Age:0    1c-Open/Close Hand:0    2-Best Gaze:0    3-Visual Fields:0    4-Facial Palsy:0    5a-Left Arm:1    5b-Right Arm:0    6a-Left Le    6b-Right Le    7-Limb Ataxia:0    8-Sensory:0    9-Best Language:0    10-Dysarthria:0    11-Extinction/Inattention:0  TOTAL SCORE:2   Imaging:   CT: IMPRESSION     1. No acute intracranial process. 2. Extensive paranasal sinus disease as above. CTA/CTP:    IMPRESSION  CTA Head:  1. No evidence of significant stenosis or aneurysm. CTA Neck:  1. No evidence of significant stenosis. 2. Asymmetric soft tissue fullness in the right tonsillar fossa with masslike  density measuring 2.5 x 1.5 cm. Recommend ENT consultation and direct  visualization to exclude the possibility of oropharyngeal malignancy. CT Brain Perfusion:  1. No acute abnormality. Plan:   TPA Candidate: NO, I was informed that teleneuro did not find any significant deficits on exam    Mechanical thrombectomy Candidate: NO     Discussed with Dr. Aguiar Session.  Awaiting teleneuro to evaluate. She was noted to have intermittent stuttering, but no aphasia. Arrival time: 1156  Time spent: 20 minutes.      Percell Dancer, NP  Neurocritical Care Nurse Practitioner

## 2022-07-21 NOTE — ED TRIAGE NOTES
Pt arrives via EMS from home c/o generalized weakness, +bilateral arm and leg weakness, denies headache or dizziness, +nausea

## 2022-07-21 NOTE — ED NOTES
Pt being assessed by teleneuro but Dr Barbara Mathis had to assess another patient and will be back shortly, pt unhooked self from bedside monitor and ambulated under her own power to restroom. Teleneuro monitor at bedside awaiting neurologist return.

## 2022-07-21 NOTE — H&P
[address] [facility] [place] [facility] Adult  Hospitalist Group  History and Physical    Date of Service:  7/21/2022  Primary Care Provider: Penelope Ellington NP  Source of information: The patient, Chart review, and Spouse/family member    Chief Complaint: Extremity Weakness      History of Presenting Illness:   Mei Cabrales is a 49 y.o. female who presents with left-sided weakness    History was primarily obtained from the patient, patient reports that she had electric sensation running through her body earlier today, patient reports it came on suddenly, she started having some left facial droop and left-sided weakness, reports that she was fine this morning before the symptoms started, got concerned and decided to come to the hospital, patient denies any other complaints or problems, patient came in as a code stroke and was requested to be admitted to the hospitalist service , while in the ER patient had another episode of slurred speech and left-sided weakness, which resolved spontaneously    The patient denies any headache, blurry vision, sore throat, trouble swallowing, trouble with speech, chest pain, SOB, cough, fever, chills, N/V/D, abd pain, urinary symptoms, constipation, recent travels, sick contacts,  falls, injuries, rashes, contact with COVID-19 diagnosed patients, hematemesis, melena, hemoptysis, hematuria, rashes, denies starting any new medications and denies any other concerns or problems besides as mentioned above. REVIEW OF SYSTEMS:  A comprehensive review of systems was negative except for that written in the History of Present Illness.      Past Medical History:   Diagnosis Date    Arthritis     Asthma     Depression     Heart palpitations     Heart palpitations     Hiatal hernia     Hiatal hernia     Insomnia     Joint pain     Morbid obesity with BMI of 40.0-44.9, adult ([facility] [address]     Morbid obesity with BMI of 45.0-49.9, adult [facility])       Past Surgical History:   Procedure Laterality Date HX  SECTION      HX CHOLECYSTECTOMY      HX KNEE ARTHROSCOPY      HX RHINOPLASTY       Prior to Admission medications    Medication Sig Start Date End Date Taking? Authorizing Provider   cyclobenzaprine (FLEXERIL) 5 mg tablet Take 1 Tablet by mouth nightly. 22   Alexandra Mendez NP   LORazepam (ATIVAN) 0.5 mg tablet  22   Provider, Historical   diclofenac EC (VOLTAREN) 75 mg EC tablet Take 1 Tablet by mouth two (2) times a day. 22   Alexandra Mendez NP   pantoprazole (PROTONIX) 40 mg tablet Take 1 Tablet by mouth daily. 22   Alexandra Mendez NP   fluticasone propionate (FLONASE) 50 mcg/actuation nasal spray SPRAY 2 SPRAYS IN EACH NOSTRIL ONCE DAILY 3/8/22   Alexandra Mendez NP   montelukast (SINGULAIR) 10 mg tablet TAKE ONE TABLET BY MOUTH DAILY 3/8/22   Jess Vuong NP   topiramate (TOPAMAX) 50 mg tablet  22   Provider, Historical   metoprolol succinate (TOPROL-XL) 50 mg XL tablet Take 1.5 Tablets by mouth daily. 21   Felecia Shrestha MD   zolpidem (AMBIEN) 10 mg tablet TAKE ONE TABLET BY MOUTH EVERY NIGHT AT BEDTIME AS NEEDED FOR SLEEP *MAXIMUM OF 1 TABLET DAILY* 21   Jess Vuong NP   budesonide/glycopyr/formoterol (BREZTRI AEROSPHERE IN) Take  by inhalation. Provider, Historical   multivitamin (ONE A DAY) tablet Take 1 Tab by mouth daily. Provider, Historical   fexofenadine-pseudoephedrine (Allegra-D 12 Hour)  mg Tb12 Take 1 Tab by mouth every twelve (12) hours. Provider, Historical   budesonide-formoteroL (Symbicort) 160-4.5 mcg/actuation HFAA Take 2 Puffs by inhalation. Provider, Historical   HYDROcodone-acetaminophen (NORCO) 5-325 mg per tablet Take 2 Tabs by mouth every four (4) hours as needed for Pain. Provider, Historical   albuterol (PROVENTIL HFA, VENTOLIN HFA, PROAIR HFA) 90 mcg/actuation inhaler Take 2 Puffs by inhalation every four (4) hours as needed for Wheezing.     Provider, Historical     Allergies   Allergen Reactions    Prednisone Other (comments)    Amoxicillin Rash    Azithromycin Rash      Family History   Problem Relation Age of Onset    OSTEOARTHRITIS Mother     Hypertension Mother     Stroke Father     Arthritis-rheumatoid Father     Heart Attack Father       Social History:  reports that she has never smoked. She has never used smokeless tobacco. She reports that she does not drink alcohol and does not use drugs. Family and social history were personally reviewed, all pertinent and relevant details are outlined as above. Objective:   Visit Vitals  BP (!) 118/97   Pulse 80   Temp 97.5 °F (36.4 °C)   Resp 13   Ht 5' 5\" (1.651 m)   Wt 130.7 kg (288 lb 2.3 oz)   SpO2 95%   BMI 47.95 kg/m²      O2 Device: None (Room air)    PHYSICAL EXAM:   General: Alert x oriented x 3, awake,   HEENT: PEERL, EOMI, moist mucus membranes  Neck: Supple, no JVD, no meningeal signs  Chest: Clear to auscultation bilaterally   CVS: RRR, S1 S2 heard, no murmurs/rubs/gallops  Abd: Soft, non-tender, non-distended, +bowel sounds   Ext: No clubbing, no cyanosis, no edema  Neuro/Psych: Pleasant mood and affect, CN 2-12 grossly intact, sensory grossly within normal limit, Strength 5/5 in all extremities, DTR 1+ x 4  Cap refill: Brisk, less than 3 seconds  Pulses: 2+, symmetric in all extremities  Skin: Warm, dry, without rashes or lesions    Data Review: All diagnostic labs and studies have been reviewed.     Abnormal Labs Reviewed   METABOLIC PANEL, COMPREHENSIVE - Abnormal; Notable for the following components:       Result Value    Chloride 112 (*)     Glucose 175 (*)     Calcium 8.4 (*)     AST (SGOT) 10 (*)     Albumin 3.0 (*)     A-G Ratio 0.8 (*)     All other components within normal limits   CBC WITH AUTOMATED DIFF - Abnormal; Notable for the following components:    RBC 3.63 (*)     HGB 10.6 (*)     HCT 33.3 (*)     MPV 8.0 (*)     MONOCYTES 14 (*)     All other components within normal limits       All Micro Results Procedure Component Value Units Date/Time    URINE CULTURE HOLD SAMPLE [[id number]] Collected: 07/21/22 1405    Order Status: Completed Specimen: Serum Updated: 07/21/22 1414     Urine culture hold       Urine on hold in Microbiology dept for 2 days. If unpreserved urine is submitted, it cannot be used for addtional testing after 24 hours, recollection will be required. IMAGING:   CTA CODE NEURO HEAD AND NECK W CONT   Final Result   CTA Head:   1. No evidence of significant stenosis or aneurysm. CTA Neck:   1. No evidence of significant stenosis. 2. Asymmetric soft tissue fullness in the right tonsillar fossa with masslike   density measuring 2.5 x 1.5 cm. Recommend ENT consultation and direct   visualization to exclude the possibility of oropharyngeal malignancy. CT Brain Perfusion:   1. No acute abnormality. CT CODE NEURO PERF W CBF   Final Result   CTA Head:   1. No evidence of significant stenosis or aneurysm. CTA Neck:   1. No evidence of significant stenosis. 2. Asymmetric soft tissue fullness in the right tonsillar fossa with masslike   density measuring 2.5 x 1.5 cm. Recommend ENT consultation and direct   visualization to exclude the possibility of oropharyngeal malignancy. CT Brain Perfusion:   1. No acute abnormality. CT CODE NEURO HEAD WO CONTRAST   Final Result      1. No acute intracranial process. 2. Extensive paranasal sinus disease as above.       MRI BRAIN WO CONT    (Results Pending)        ECG/ECHO:    Results for orders placed or performed during the hospital encounter of 07/21/22   EKG, 12 LEAD, INITIAL   Result Value Ref Range    Ventricular Rate 73 BPM    Atrial Rate 73 BPM    P-R Interval 160 ms    QRS Duration 98 ms    Q-T Interval 416 ms    QTC Calculation (Bezet) 458 ms    Calculated P Axis 60 degrees    Calculated R Axis 22 degrees    Calculated T Axis 40 degrees    Diagnosis       Normal sinus rhythm  Cannot rule out Anterior infarct , age undetermined  Borderline ECG  No previous ECGs available  Confirmed by Cecelia Wahl MD. ([id number]) on 7/21/2022 12:24:42 PM          Assessment:   Given the patient's current clinical presentation, there is a high level of concern for decompensation if discharged from the emergency department. Complex decision making was performed, which includes reviewing the patient's available past medical records, laboratory results, and imaging studies. TIA  Tonsillar mass  Anemia  Plan:     Patient will be admitted on a telemetry bed, rule out CVA, MRI of the brain, neurovascular checks, aspirin, statin, neurology consult, PT OT speech consult, telemetry and further intervention per hospital course  ENT consult appreciated, outpatient work-up  Appears to be chronic, continue to monitor      DIET: ADULT DIET Regular; 4 carb choices (60 gm/meal); Low Fat/Low Chol/High Fiber/DU; Low Sodium (2 gm)   ISOLATION PRECAUTIONS: There are currently no Active Isolations  CODE STATUS: Full Code   DVT PROPHYLAXIS: SCDs  FUNCTIONAL STATUS PRIOR TO HOSPITALIZATION: Fully active and ambulatory; able to carry on all self-care without restriction. EARLY MOBILITY ASSESSMENT: Recommend routine ambulation while hospitalized with the assistance of nursing staff  ANTICIPATED DISCHARGE: 24-48 hours. Signed By: Yong Pierre MD     July 21, 2022         Please note that this dictation may have been completed with Dragon, the computer voice recognition software. Quite often unanticipated grammatical, syntax, homophones, and other interpretive errors are inadvertently transcribed by the computer software. Please disregard these errors. Please excuse any errors that have escaped final proofreading.

## 2022-07-22 ENCOUNTER — APPOINTMENT (OUTPATIENT)
Dept: NON INVASIVE DIAGNOSTICS | Age: 57
End: 2022-07-22
Attending: FAMILY MEDICINE
Payer: MEDICAID

## 2022-07-22 VITALS
HEIGHT: 65 IN | DIASTOLIC BLOOD PRESSURE: 80 MMHG | OXYGEN SATURATION: 98 % | TEMPERATURE: 98.4 F | RESPIRATION RATE: 28 BRPM | BODY MASS INDEX: 48.01 KG/M2 | WEIGHT: 288.14 LBS | HEART RATE: 89 BPM | SYSTOLIC BLOOD PRESSURE: 121 MMHG

## 2022-07-22 LAB
ANION GAP SERPL CALC-SCNC: 7 MMOL/L (ref 5–15)
BUN SERPL-MCNC: 12 MG/DL (ref 6–20)
BUN/CREAT SERPL: 19 (ref 12–20)
CALCIUM SERPL-MCNC: 9 MG/DL (ref 8.5–10.1)
CHLORIDE SERPL-SCNC: 110 MMOL/L (ref 97–108)
CHOLEST SERPL-MCNC: 206 MG/DL
CK SERPL-CCNC: 120 U/L (ref 26–192)
CO2 SERPL-SCNC: 25 MMOL/L (ref 21–32)
CREAT SERPL-MCNC: 0.62 MG/DL (ref 0.55–1.02)
CRP SERPL HS-MCNC: 3 MG/L
ECHO AV PEAK GRADIENT: 11 MMHG
ECHO AV PEAK VELOCITY: 1.6 M/S
ECHO AV VELOCITY RATIO: 0.63
ECHO EST RA PRESSURE: 3 MMHG
ECHO LV E' LATERAL VELOCITY: 13 CM/S
ECHO LV E' SEPTAL VELOCITY: 9 CM/S
ECHO LVOT PEAK GRADIENT: 4 MMHG
ECHO LVOT PEAK VELOCITY: 1 M/S
ECHO MV A VELOCITY: 0.94 M/S
ECHO MV E VELOCITY: 1.17 M/S
ECHO MV E/A RATIO: 1.24
ECHO MV E/E' LATERAL: 9
ECHO MV E/E' RATIO (AVERAGED): 11
ECHO MV E/E' SEPTAL: 13
ECHO RIGHT VENTRICULAR SYSTOLIC PRESSURE (RVSP): 17 MMHG
ECHO RV FREE WALL PEAK S': 9 CM/S
ECHO RVOT PEAK GRADIENT: 4 MMHG
ECHO RVOT PEAK VELOCITY: 1 M/S
ECHO TV REGURGITANT MAX VELOCITY: 1.88 M/S
ECHO TV REGURGITANT PEAK GRADIENT: 14 MMHG
ERYTHROCYTE [DISTWIDTH] IN BLOOD BY AUTOMATED COUNT: 13.2 % (ref 11.5–14.5)
ERYTHROCYTE [SEDIMENTATION RATE] IN BLOOD: 22 MM/HR (ref 0–30)
EST. AVERAGE GLUCOSE BLD GHB EST-MCNC: 103 MG/DL
GLUCOSE SERPL-MCNC: 94 MG/DL (ref 65–100)
HBA1C MFR BLD: 5.2 % (ref 4–5.6)
HCT VFR BLD AUTO: 38.8 % (ref 35–47)
HDLC SERPL-MCNC: 85 MG/DL
HDLC SERPL: 2.4 {RATIO} (ref 0–5)
HGB BLD-MCNC: 12.3 G/DL (ref 11.5–16)
LDLC SERPL CALC-MCNC: 105.6 MG/DL (ref 0–100)
MCH RBC QN AUTO: 29.3 PG (ref 26–34)
MCHC RBC AUTO-ENTMCNC: 31.7 G/DL (ref 30–36.5)
MCV RBC AUTO: 92.4 FL (ref 80–99)
NRBC # BLD: 0 K/UL (ref 0–0.01)
NRBC BLD-RTO: 0 PER 100 WBC
PLATELET # BLD AUTO: 324 K/UL (ref 150–400)
PMV BLD AUTO: 8.6 FL (ref 8.9–12.9)
POTASSIUM SERPL-SCNC: 3.6 MMOL/L (ref 3.5–5.1)
RBC # BLD AUTO: 4.2 M/UL (ref 3.8–5.2)
SODIUM SERPL-SCNC: 142 MMOL/L (ref 136–145)
TRIGL SERPL-MCNC: 77 MG/DL (ref ?–150)
TSH SERPL DL<=0.05 MIU/L-ACNC: 4.81 UIU/ML (ref 0.36–3.74)
VLDLC SERPL CALC-MCNC: 15.4 MG/DL
WBC # BLD AUTO: 5.8 K/UL (ref 3.6–11)

## 2022-07-22 PROCEDURE — 94640 AIRWAY INHALATION TREATMENT: CPT

## 2022-07-22 PROCEDURE — 85652 RBC SED RATE AUTOMATED: CPT

## 2022-07-22 PROCEDURE — 83036 HEMOGLOBIN GLYCOSYLATED A1C: CPT

## 2022-07-22 PROCEDURE — 74011250636 HC RX REV CODE- 250/636: Performed by: STUDENT IN AN ORGANIZED HEALTH CARE EDUCATION/TRAINING PROGRAM

## 2022-07-22 PROCEDURE — 86141 C-REACTIVE PROTEIN HS: CPT

## 2022-07-22 PROCEDURE — 74011000250 HC RX REV CODE- 250: Performed by: FAMILY MEDICINE

## 2022-07-22 PROCEDURE — 93306 TTE W/DOPPLER COMPLETE: CPT | Performed by: SPECIALIST

## 2022-07-22 PROCEDURE — 97116 GAIT TRAINING THERAPY: CPT

## 2022-07-22 PROCEDURE — 97161 PT EVAL LOW COMPLEX 20 MIN: CPT

## 2022-07-22 PROCEDURE — 96374 THER/PROPH/DIAG INJ IV PUSH: CPT

## 2022-07-22 PROCEDURE — 96372 THER/PROPH/DIAG INJ SC/IM: CPT

## 2022-07-22 PROCEDURE — 80048 BASIC METABOLIC PNL TOTAL CA: CPT

## 2022-07-22 PROCEDURE — 97535 SELF CARE MNGMENT TRAINING: CPT

## 2022-07-22 PROCEDURE — 74011250637 HC RX REV CODE- 250/637: Performed by: FAMILY MEDICINE

## 2022-07-22 PROCEDURE — 82550 ASSAY OF CK (CPK): CPT

## 2022-07-22 PROCEDURE — 80061 LIPID PANEL: CPT

## 2022-07-22 PROCEDURE — 97165 OT EVAL LOW COMPLEX 30 MIN: CPT

## 2022-07-22 PROCEDURE — G0378 HOSPITAL OBSERVATION PER HR: HCPCS

## 2022-07-22 PROCEDURE — 99205 OFFICE O/P NEW HI 60 MIN: CPT | Performed by: PSYCHIATRY & NEUROLOGY

## 2022-07-22 PROCEDURE — 92610 EVALUATE SWALLOWING FUNCTION: CPT | Performed by: SPEECH-LANGUAGE PATHOLOGIST

## 2022-07-22 PROCEDURE — 36415 COLL VENOUS BLD VENIPUNCTURE: CPT

## 2022-07-22 PROCEDURE — 85027 COMPLETE CBC AUTOMATED: CPT

## 2022-07-22 PROCEDURE — 84443 ASSAY THYROID STIM HORMONE: CPT

## 2022-07-22 RX ORDER — ATORVASTATIN CALCIUM 40 MG/1
40 TABLET, FILM COATED ORAL
Status: DISCONTINUED | OUTPATIENT
Start: 2022-07-22 | End: 2022-07-22 | Stop reason: HOSPADM

## 2022-07-22 RX ORDER — GUAIFENESIN 100 MG/5ML
81 LIQUID (ML) ORAL DAILY
Qty: 30 TABLET | Refills: 0 | Status: SHIPPED | OUTPATIENT
Start: 2022-07-23

## 2022-07-22 RX ORDER — ENOXAPARIN SODIUM 100 MG/ML
30 INJECTION SUBCUTANEOUS EVERY 12 HOURS
Status: DISCONTINUED | OUTPATIENT
Start: 2022-07-22 | End: 2022-07-22 | Stop reason: HOSPADM

## 2022-07-22 RX ORDER — ATORVASTATIN CALCIUM 40 MG/1
40 TABLET, FILM COATED ORAL
Qty: 30 TABLET | Refills: 0 | Status: SHIPPED | OUTPATIENT
Start: 2022-07-22 | End: 2022-08-23 | Stop reason: SDUPTHER

## 2022-07-22 RX ADMIN — ARFORMOTEROL TARTRATE 15 MCG: 15 SOLUTION RESPIRATORY (INHALATION) at 09:26

## 2022-07-22 RX ADMIN — TOPIRAMATE 50 MG: 25 TABLET, FILM COATED ORAL at 08:13

## 2022-07-22 RX ADMIN — LORAZEPAM 0.5 MG: 0.5 TABLET ORAL at 17:47

## 2022-07-22 RX ADMIN — ENOXAPARIN SODIUM 30 MG: 30 INJECTION SUBCUTANEOUS at 17:47

## 2022-07-22 RX ADMIN — ASPIRIN 81 MG CHEWABLE TABLET 81 MG: 81 TABLET CHEWABLE at 08:12

## 2022-07-22 RX ADMIN — THERA TABS 1 TABLET: TAB at 08:13

## 2022-07-22 RX ADMIN — METOPROLOL SUCCINATE 75 MG: 50 TABLET, EXTENDED RELEASE ORAL at 08:13

## 2022-07-22 RX ADMIN — PANTOPRAZOLE SODIUM 40 MG: 40 TABLET, DELAYED RELEASE ORAL at 08:12

## 2022-07-22 RX ADMIN — BUDESONIDE 500 MCG: 0.5 INHALANT RESPIRATORY (INHALATION) at 09:26

## 2022-07-22 RX ADMIN — LORAZEPAM 0.5 MG: 0.5 TABLET ORAL at 08:13

## 2022-07-22 RX ADMIN — FAMOTIDINE 20 MG: 20 TABLET ORAL at 08:13

## 2022-07-22 NOTE — PROGRESS NOTES
Chart reviewed. CM called patient to complete CM assessment. Patient stated she is currently having a test done and asked CM to call her back. CM will follow-up.     YISSEL Zhang/CRM

## 2022-07-22 NOTE — PROGRESS NOTES
Transition of Care: Plan for discharge home with daughter when medically stable. Patient uses a rolling walker and cane to ambulate. Patient stated that she has about 40 stairs to get up to her apartment. The daughter will transport patient home at discharge. Care Management Interventions  PCP Verified by CM: Yes  Mode of Transport at Discharge: Other (see comment) (family/car)  Transition of Care Consult (CM Consult): Discharge Planning  MyChart Signup: No  Discharge Durable Medical Equipment: No  Physical Therapy Consult: Yes  Occupational Therapy Consult: Yes  Speech Therapy Consult: Yes  Support Systems: Child(cyn)  Confirm Follow Up Transport: Family  The Patient and/or Patient Representative was Provided with a Choice of Provider and Agrees with the Discharge Plan?: Yes  Freedom of Choice List was Provided with Basic Dialogue that Supports the Patient's Individualized Plan of Care/Goals, Treatment Preferences and Shares the Quality Data Associated with the Providers?: Yes  Discharge Location  Patient Expects to be Discharged to[de-identified] Home with family assistance    Reason for Admission:  left sided weakness                     RUR Score:    N/A, observation status                 Plan for utilizing home health:    TBD, there are no home care orders at this time. PCP: First and Last name:  Danae Mcnamara NP     Name of Practice:    Are you a current patient: Yes/No: yes   Approximate date of last visit: March 2022   Can you participate in a virtual visit with your PCP:                     Current Advanced Directive/Advance Care Plan: Full Code      Healthcare Decision Maker:   Click here to complete [address] including selection of the Healthcare Decision Maker Relationship (ie \"Primary\")           Primary Decision-Maker: Kenzie Koch, daughter, #[phone]                  Transition of Care Plan:     Home with daughter. Chart reviewed.  CM completed assessment with patient via phone. Patient lives with her daughter in a 3rd floor apartment with approximately 40 stairs to get up to her apartment. Patient stated she uses a cane and rolling walker to ambulate. Patient stated that she is able to complete ADLs mostly independently, but does receive some assistance from her daughter. Patient stated that her daughter assists her with bathing, and does the cooking. Patient has no history of HH or rehab, however stated she has been to outpatient therapy in the past. Patient gets prescriptions filled at [facility] on [address]. Patient stated daughter will transport her home at discharge. Care management will be available if needs arise.     Travis Vanegas, YISSEL/CRM

## 2022-07-22 NOTE — DISCHARGE SUMMARY
Discharge Summary       PATIENT ID: Joann Benjamin  MRN: [id number]   YOB: 1965    DATE OF ADMISSION: 7/21/2022 11:24 AM    DATE OF DISCHARGE: 7/22/22   PRIMARY CARE PROVIDER: Angella Villegas NP     ATTENDING PHYSICIAN: Kathryn Tong MD  DISCHARGING PROVIDER: Kathryn Tong MD    To contact this individual call [phone] and ask the  to page. If unavailable ask to be transferred the Adult Hospitalist Department. CONSULTATIONS: IP CONSULT TO NEUROLOGY  IP CONSULT TO OTOLARYNGOLOGY    PROCEDURES/SURGERIES: * No surgery found *    [address] COURSE:   Joann Benjamin is a 49 y.o. female who presents with left-sided weakness    History was primarily obtained from the patient, patient reports that she had electric sensation running through her body earlier today, patient reports it came on suddenly, she started having some left facial droop and left-sided weakness, reports that she was fine this morning before the symptoms started, got concerned and decided to come to the hospital, patient denies any other complaints or problems, patient came in as a code stroke and was requested to be admitted to the hospitalist service , while in the ER patient had another episode of slurred speech and left-sided weakness, which resolved spontaneously     The patient denies any headache, blurry vision, sore throat, trouble swallowing, trouble with speech, chest pain, SOB, cough, fever, chills, N/V/D, abd pain, urinary symptoms, constipation, recent travels, sick contacts,  falls, injuries, rashes, contact with COVID-19 diagnosed patients, hematemesis, melena, hemoptysis, hematuria, rashes, denies starting any new medications and denies any other concerns or problems besides as mentioned above. TIA  Tonsillar mass  Anemia    Hospital course  Patient had negative imaging. Seen by neurology and cleared. Echocardiogram negative. Continue ASA and statin.  Blood pressure and diet control. Patient had a nonfocal physical exam.  Patient stable and symptoms have mostly resolved. Patient attempted to initially leave AMA as she did not want to stay in extra night and we are still pending echocardiogram results. I spoke to her and told her that I would send her home and follow-up on echocardiogram results but they came in as I was getting ready to discharge her. Echocardiogram negative. Patient reports being safe at home and having full family support. DISCHARGE DIAGNOSES / PLAN:      TIA. Aspirin and statin. Diet. Blood pressure control. Tonsillar mass. Outpatient follow-up with ENT. Information and discharge paperwork. Anemia. Stable. Hyperlipidemia. Stable. PENDING TEST RESULTS:   At the time of discharge the following test results are still pending: none    FOLLOW UP APPOINTMENTS:    Follow-up Information       Follow up With Specialties Details Why Contact Info    Silvestre Velazquez NP Family Medicine Call in 1 week(s) Schedule follow up within 1 week [id number] [phone]               ADDITIONAL CARE RECOMMENDATIONS: none    DIET: Regular Diet    ACTIVITY: Activity as tolerated    WOUND CARE: none    EQUIPMENT needed: none      DISCHARGE MEDICATIONS:  Current Discharge Medication List        START taking these medications    Details   aspirin 81 mg chewable tablet Take 1 Tablet by mouth in the morning. Qty: 30 Tablet, Refills: 0  Start date: 7/23/2022      atorvastatin (LIPITOR) 40 mg tablet Take 1 Tablet by mouth nightly. Qty: 30 Tablet, Refills: 0  Start date: 7/22/2022           CONTINUE these medications which have NOT CHANGED    Details   cyclobenzaprine (FLEXERIL) 5 mg tablet Take 1 Tablet by mouth nightly. Qty: 90 Tablet, Refills: 0      LORazepam (ATIVAN) 0.5 mg tablet       diclofenac EC (VOLTAREN) 75 mg EC tablet Take 1 Tablet by mouth two (2) times a day.   Qty: 30 Tablet, Refills: 1    Associated Diagnoses: Arthralgia, unspecified joint      pantoprazole (PROTONIX) 40 mg tablet Take 1 Tablet by mouth daily. Qty: 90 Tablet, Refills: 1    Associated Diagnoses: Hiatal hernia      fluticasone propionate (FLONASE) 50 mcg/actuation nasal spray SPRAY 2 SPRAYS IN EACH NOSTRIL ONCE DAILY  Qty: 1 Each, Refills: 3      montelukast (SINGULAIR) 10 mg tablet TAKE ONE TABLET BY MOUTH DAILY  Qty: 30 Tablet, Refills: 3      topiramate (TOPAMAX) 50 mg tablet Take 50 mg by mouth two (2) times a day. metoprolol succinate (TOPROL-XL) 50 mg XL tablet Take 1.5 Tablets by mouth daily. Qty: 135 Tablet, Refills: 1      zolpidem (AMBIEN) 10 mg tablet TAKE ONE TABLET BY MOUTH EVERY NIGHT AT BEDTIME AS NEEDED FOR SLEEP *MAXIMUM OF 1 TABLET DAILY*  Qty: 90 Tablet, Refills: 0    Associated Diagnoses: Primary insomnia      budesonide/glycopyr/formoterol (BREZTRI AEROSPHERE IN) Take  by inhalation. multivitamin (ONE A DAY) tablet Take 1 Tab by mouth daily. fexofenadine-pseudoephedrine (ALLEGRA-D)  mg Tb12 Take 1 Tab by mouth every twelve (12) hours. budesonide-formoteroL (SYMBICORT) 160-4.5 mcg/actuation HFAA Take 2 Puffs by inhalation. HYDROcodone-acetaminophen (NORCO) 5-325 mg per tablet Take 2 Tabs by mouth every four (4) hours as needed for Pain. albuterol (PROVENTIL HFA, VENTOLIN HFA, PROAIR HFA) 90 mcg/actuation inhaler Take 2 Puffs by inhalation every four (4) hours as needed for Wheezing. NOTIFY YOUR PHYSICIAN FOR ANY OF THE FOLLOWING:   Fever over 101 degrees for 24 hours. Chest pain, shortness of breath, fever, chills, nausea, vomiting, diarrhea, change in mentation, falling, weakness, bleeding. Severe pain or pain not relieved by medications. Or, any other signs or symptoms that you may have questions about.     DISPOSITION:   X Home With:   OT  PT  HH  RN       Long term SNF/Inpatient Rehab    Independent/assisted living    Hospice    Other:       PATIENT CONDITION AT DISCHARGE:     Functional status    Poor     Deconditioned    X Independent      Cognition    X Lucid     Forgetful     Dementia      Catheters/lines (plus indication)    Gabriel     PICC     PEG    X None      Code status    X Full code     DNR      PHYSICAL EXAMINATION AT DISCHARGE:  Constitutional:  No acute distress, cooperative, pleasant    ENT:  Oral mucosa moist, oropharynx benign. Resp:  CTA bilaterally. No wheezing/rhonchi/rales. No accessory muscle use. CV:  Regular rhythm, normal rate, no murmurs, gallops, rubs    GI:  Soft, non distended, non tender. normoactive bowel sounds, no hepatosplenomegaly    Musculoskeletal:  No edema, warm, 2+ pulses throughout    Neurologic:  Moves all extremities.   AAOx3, CN II-XII reviewed     CHRONIC MEDICAL DIAGNOSES:  Problem List as of 7/22/2022 Date Reviewed: 6/15/2022            Codes Class Noted - Resolved    TIA (transient ischemic attack) ICD-10-CM: G45.9  ICD-9-CM: 435.9  7/21/2022 - Present        Median nerve neuropathy ICD-10-CM: G56.10  ICD-9-CM: 354.1  7/30/2021 - Present        Depression, major, severe recurrence ([facility] [address] ICD-10-CM: F33.2  ICD-9-CM: 296.33  11/25/2020 - Present        Severe obesity ([facility] [address] ICD-10-CM: E66.01  ICD-9-CM: 278.01  10/23/2020 - Present        Fibromyalgia ICD-10-CM: M79.7  ICD-9-CM: 729.1  9/29/2020 - Present           Greater than 30 minutes were spent with the patient on counseling and coordination of care    Signed:   Aleksandra Garcia MD  7/22/2022  6:48 PM

## 2022-07-22 NOTE — PROGRESS NOTES
I have reviewed discharge instructions with the patient. The patient verbalized understanding. Patient transport home with daughter.

## 2022-07-22 NOTE — PROGRESS NOTES
Problem: Falls - Risk of  Goal: *Absence of Falls  Description: Document Glady Fall Risk and appropriate interventions in the flowsheet.   Outcome: Progressing Towards Goal  Note: Fall Risk Interventions:            Medication Interventions: Bed/chair exit alarm, Patient to call before getting OOB         History of Falls Interventions: Bed/chair exit alarm, Door open when patient unattended         Problem: Patient Education: Go to Patient Education Activity  Goal: Patient/Family Education  Outcome: Progressing Towards Goal     Problem: Patient Education: Go to Patient Education Activity  Goal: Patient/Family Education  Outcome: Progressing Towards Goal

## 2022-07-22 NOTE — PROGRESS NOTES
Orders received, chart reviewed and patient evaluated by physical therapy. Pending progression with skilled acute physical therapy, recommend:  Home with family assist and HHPT vs none    Full evaluation to follow.      Sean Lanre, PT, DPT

## 2022-07-22 NOTE — PROGRESS NOTES
Problem: Mobility Impaired (Adult and Pediatric)  Goal: *Acute Goals and Plan of Care (Insert Text)  Description:   FUNCTIONAL STATUS PRIOR TO ADMISSION: Patient was independent and active without use of DME. She owns a RW and rollator;     HOME SUPPORT PRIOR TO ADMISSION: The patient lived with her daughter who provided occasional assist with ADLs/IADLs as needed 2* fibromyalgia pain. Physical Therapy Goals  Initiated 7/22/2022  1. Patient will move from supine to sit and sit to supine  and scoot up and down in bed with modified independence within 7 day(s). 2.  Patient will transfer from bed to chair and chair to bed with modified independence using the least restrictive device within 7 day(s). 3.  Patient will perform sit to stand with modified independence within 7 day(s). 4.  Patient will ambulate with modified independence for 150 feet with the least restrictive device within 7 day(s). 5.  Patient will ascend/descend 8 stairs with handrail(s) with modified independence within 7 day(s). 6.  Patient will improve Lima Balance score by 7 points within 7 days. Outcome: Progressing Towards Goal     PHYSICAL THERAPY EVALUATION- NEURO POPULATION  Patient: Lilly Mallory (55 y.o. female)  Date: 7/22/2022  Primary Diagnosis: TIA (transient ischemic attack) [G45.9]       Precautions:          ASSESSMENT  Based on the objective data described below, the patient presents with slightly impaired functional mobility as compared to baseline level 2* c/o L sided weakness, L paresthesias, impaired balance, and impaired gait following admission for CVA work up. CT and MRI negative for acute process. At baseline, she lived at home and was generally indep with mobility; daughter provided assist if/as needed on \"bad days\" when fibromyalgia pain is greater. Today, she demos inconsistent L sided weakness and impaired coordination, more pronounced with formal testing than observed functionally.  She was able to complete transfers with SBA and no overt difficulty. Gait training initiated in room and hallway with and without RW trial - demos increased lateral sway, slowed and deliberate sydnie, with decr L step length/clearance but no major LOB or knee buckle. Subjectively more confident using RW though quality remains nearly the same. Provided education on home safety, energy conservation, stair negotiation, and BE FAST. Remained up in chair at end of session, NAD. Anticipate that she will be appropriate for discharge home with family assist and HHPT vs none. Will follow for higher level gait/balance progression if remains admitted through the weekend. .     Current Level of Function Impacting Discharge (mobility/balance): CGA for ambulation with and without AD    Functional Outcome Measure: The patient scored Total: 27/56 on the Lima Balance Assessment which is indicative of moderate fall risk. Other factors to consider for discharge: has good family support     Patient will benefit from skilled therapy intervention to address the above noted impairments. PLAN :  Recommendations and Planned Interventions: bed mobility training, transfer training, gait training, therapeutic exercises, neuromuscular re-education, patient and family training/education, and therapeutic activities      Frequency/Duration: Patient will be followed by physical therapy:  3 times a week to address goals. Recommendation for discharge: (in order for the patient to meet his/her long term goals)  Home with family assist and HHPT vs none    This discharge recommendation:  A follow-up discussion with the attending provider and/or case management is planned    IF patient discharges home will need the following DME: patient owns DME required for discharge         SUBJECTIVE:   Patient stated This side is weaker (left).     OBJECTIVE DATA SUMMARY:   HISTORY:    Past Medical History:   Diagnosis Date    Arthritis     Asthma     Depression Heart palpitations     Heart palpitations     Hiatal hernia     Hiatal hernia     Insomnia     Joint pain     Morbid obesity with BMI of 40.0-44.9, adult (HCC)     Morbid obesity with BMI of 45.0-49.9, adult (HCC)      Past Surgical History:   Procedure Laterality Date    HX  SECTION      HX CHOLECYSTECTOMY      HX KNEE ARTHROSCOPY      HX RHINOPLASTY         Personal factors and/or comorbidities impacting plan of care: PMHx    Home Situation  Home Environment: Apartment  # Steps to Enter: 59  Rails to Enter: Yes  Hand Rails : Bilateral  One/Two Story Residence: One story  Living Alone: No  Support Systems: Child(cyn), Other Family Member(s)  Patient Expects to be Discharged to[de-identified] Home  Current DME Used/Available at Home: Angela Ata, straight, Walker, rollator, Walker, rolling  Tub or Shower Type: Tub/Shower combination    EXAMINATION/PRESENTATION/DECISION MAKING:   Critical Behavior:  Neurologic State: Alert  Orientation Level: Oriented X4  Cognition: Follows commands  Safety/Judgement: Awareness of environment, Insight into deficits  Hearing:   Auditory  Auditory Impairment: None  Skin:    Edema:   Range Of Motion:  AROM: Generally decreased, functional  PROM: Within functional limits        Strength:    Strength: Generally decreased, functional (L UE/LE weakness with formal MMT; ?giveway)    Tone & Sensation:   Tone: Normal  Sensation: Impaired (diminished on L side)     Coordination:  Coordination: Generally decreased, functional (slowed and mildly dysmetric L UE with formal FTN)  Vision:   Tracking: Able to track stimulus in all quadrants w/o difficulty  Diplopia: No  Acuity: Within Defined Limits  Corrective Lenses: Glasses  Functional Mobility:  Bed Mobility:     Supine to Sit: Supervision  Scooting: Supervision    Transfers:  Sit to Stand: Stand-by assistance  Stand to Sit: Stand-by assistance  Bed to Chair: Contact guard assistance     Balance:   Sitting: Intact  Sitting - Static: Good (unsupported)  Sitting - Dynamic: Good (unsupported)  Standing: Impaired  Standing - Static: Good  Standing - Dynamic : Good;Constant support    Ambulation/Gait Training:  Distance (ft): 60 Feet (ft) (x2)  Assistive Device: Gait belt (trial with and without RW)  Ambulation - Level of Assistance: Contact guard assistance  Gait Description (WDL): Exceptions to WDL  Gait Abnormalities: Altered arm swing;Decreased step clearance;Shuffling gait;Trunk sway increased  Base of Support: Center of gravity altered; Widened  Stance: Left decreased  Speed/Kelsie: Shuffled; Slow  Step Length: Left shortened  Swing Pattern: Left asymmetrical             Functional Measure  Lima Balance Test:    Sitting to Standing: 3  Standing Unsupported: 3  Sitting with Back Unsupported: 4  Standing to Sitting: 3  Transfers: 3  Standing Unsupported with Eyes Closed: 3  Standing Unsupported with Feet Together: 1  Reach Forward with Outstretched Arm: 2   Object: 0  Turn to Look Over Shoulders: 2  Turn 360 Degrees: 2  Alternate Foot on Step/Stool: 1  Standing Unsupported One Foot in Front: 0  Stand on One Le  Total: 27/56         56=Maximum possible score;   0-20=High fall risk  21-40=Moderate fall risk   41-56=Low fall risk        Physical Therapy Evaluation Charge Determination   History Examination Presentation Decision-Making   MEDIUM  Complexity : 1-2 comorbidities / personal factors will impact the outcome/ POC  MEDIUM Complexity : 3 Standardized tests and measures addressing body structure, function, activity limitation and / or participation in recreation  LOW Complexity : Stable, uncomplicated  MEDIUM Complexity : FOTO score of 26-74      Based on the above components, the patient evaluation is determined to be of the following complexity level: LOW       Activity Tolerance:   Good      After treatment patient left in no apparent distress:   Sitting in chair, Call bell within reach, and Bed / chair alarm activated    COMMUNICATION/EDUCATION:   The patients plan of care was discussed with: Occupational therapist and Registered nurse. Patient was educated regarding her deficit(s) of L weakness as this relates to her diagnosis of r/o CVA. She demonstrated Good understanding as evidenced by nodding. Patient and/or family was verbally educated on the BE FAST acronym for signs/symptoms of CVA and TIA. BE FAST was written on patient's communication board  for visual education and reinforcement. All questions answered with patient indicating good understanding. Fall prevention education was provided and the patient/caregiver indicated understanding., Patient/family have participated as able in goal setting and plan of care. , and Patient/family agree to work toward stated goals and plan of care.     Thank you for this referral.  Barry Monday, PT, DPT   Time Calculation: 18 mins

## 2022-07-22 NOTE — CONSULTS
Neuro consult completed. Dictated note to follow. Pt with spell at home that sounds nothing like a stroke, then had a spell in ED of slurred sp and left A/L weakness. Still has left arm and leg weakness despite normal MRI brain. Exam with non-physiological findings. Echo done result is pending. Stable for d/c if echo is neg. Continue ASA 81mg daily and Lipitor 40mg daily.

## 2022-07-22 NOTE — PROGRESS NOTES
SPEECH PATHOLOGY BEDSIDE SWALLOW EVALUATION/DISCHARGE  Patient: Emelina Bradley (55 y.o. female)  Date: 2022  Primary Diagnosis: TIA (transient ischemic attack) [G45.9]       Precautions:        ASSESSMENT :  Based on the objective data described below, the patient presents with functional oropharyngeal swallow characterized by timely and complete mastication and propulsion as well as suspected functional hyolaryngeal elevation/excursion and suspected timely swallow initiation. No s/s of aspiration noted. Speech/language observed during conversation and patient exhibited fluent speech with no instances of stuttering and no other difficulties noted. Of note, voice became more hoarse with phonation breaks with fatigue as conversation went on. Agree with ENT consult to further assess vocal cord function especially in light of incidental finding on CT and patient report of significant allergies with coughing and easy loss of voice if she does not take medications every day. Skilled acute therapy provided by a speech-language pathologist is not indicated at this time. PLAN :  Recommendations:  -recommend regular/thin liquid diet  -recommend ENT consult as planned post discharge  -recommend discharge from acute SLP services    Discharge Recommendations: To Be Determined     SUBJECTIVE:   Patient stated I have a lot of congestion when I don't take my allergy medications.     OBJECTIVE:     Past Medical History:   Diagnosis Date    Arthritis     Asthma     Depression     Heart palpitations     Heart palpitations     Hiatal hernia     Hiatal hernia     Insomnia     Joint pain     Morbid obesity with BMI of 40.0-44.9, adult ([address]     Morbid obesity with BMI of 45.0-49.9, adult ([address]      Past Surgical History:   Procedure Laterality Date    HX  SECTION      HX CHOLECYSTECTOMY      HX KNEE ARTHROSCOPY      HX RHINOPLASTY       Prior Level of Function/Home Situation: [address] [place] [address] Environment: Apartment  # Steps to Enter: 59  Rails to Enter: Yes  Hand Rails : Bilateral  One/Two Story Residence: One story  Living Alone: No  Support Systems: Child(cyn), Other Family Member(s)  Patient Expects to be Discharged to[de-identified] Home  Current DME Used/Available at Home: [address], [address], straight, Walker, rollator, Walker, rolling  Tub or Shower Type: Tub/Shower combination  Diet prior to admission: regular  Current Diet:  regular/thin liquid   Cognitive and Communication Status:  Neurologic State: Alert  Orientation Level: Oriented X4  Cognition: Follows commands  Perception: Appears intact  Perseveration: No perseveration noted  Safety/Judgement: Awareness of environment  Oral Assessment:  Oral Assessment  Labial: No impairment  Dentition: Natural  Oral Hygiene: moist mucosa  Lingual: No impairment  Mandible: No impairment  P.O. Trials:  Patient Position: upright in chair  Vocal quality prior to P.O.: Fatigue;Hoarse;Phonation breaks (increased hoarseness/phonation breaks with fatigue)  Consistency Presented: Solid; Thin liquid  How Presented: Successive swallows;Straw;Self-fed/presented     Bolus Acceptance: No impairment  Bolus Formation/Control: No impairment     Propulsion: No impairment  Oral Residue: None  Initiation of Swallow: No impairment  Laryngeal Elevation: Functional  Aspiration Signs/Symptoms: None  Pharyngeal Phase Characteristics: No impairment, issues, or problems              Oral Phase Severity: No impairment  Pharyngeal Phase Severity : No impairment  NOMS:   The NOMS functional outcome measure was used to quantify this patient's level of swallowing impairment.   Based on the NOMS, the patient was determined to be at level 7 for swallow function     NOMS Swallowing Levels:  Level 1 (CN): NPO  Level 2 (CM): NPO but takes consistency in therapy  Level 3 (CL): Takes less than 50% of nutrition p.o. and continues with nonoral feedings; and/or safe with mod cues; and/or max diet restriction  Level 4 (CK): Safe swallow but needs mod cues; and/or mod diet restriction; and/or still requires some nonoral feeding/supplements  Level 5 (CJ): Safe swallow with min diet restriction; and/or needs min cues  Level 6 (CI): Independent with p.o.; rare cues; usually self cues; may need to avoid some foods or needs extra time  Level 7 ([address] [place] [address]): Independent for all p.o.  CHARLIE. (2003). National Outcomes Measurement System (NOMS): Adult Speech-Language Pathology User's Guide. Pain:  Pain Scale 1: Visual  Pain Intensity 1: 0     After treatment:   Patient left in no apparent distress sitting up in chair, Call bell within reach, and Nursing notified    COMMUNICATION/EDUCATION:   Patient was educated regarding her functional oropharyngeal swallow as this relates to her diagnosis of TIA. She demonstrated Good understanding as evidenced by verbalization of agreement. The patient's plan of care including recommendations, planned interventions, and recommended diet changes were discussed with: Registered nurse. Thank you for this referral.  Chito Galvez SLP student   Time Calculation: 19 mins         Regarding student involvement in patient care:  A student participated in this treatment session. Per CMS Medicare statements and CHARLIE guidelines I certify that the following was true:  1. I was present and directly observed the entire session. 2. I made all skilled judgments and clinical decisions regarding care. 3. I am the practitioner responsible for assessment, treatment, and documentation.

## 2022-07-22 NOTE — PROGRESS NOTES
Elisabet Gonzalez provided to patient/representative with verbal explanation of the notice. Time allotted for questions regarding the notice. Patient /representative provided a completed copy of the VOON notice. Copy placed on bedside chart.   Daniela Reyes, Care Management Assistant

## 2022-07-22 NOTE — PROGRESS NOTES
[facility] Adult  Hospitalist Group                                                                                          Hospitalist Progress Note  Ankur Strickland MD  Answering service: [phone] or [phone] from in house phone        Date of Service:  2022  NAME:  Sheri Brock  :  1965  MRN:  [id number]      Admission Summary:   Sheri Brock is a 49 y.o. female who presents with left-sided weakness    History was primarily obtained from the patient, patient reports that she had electric sensation running through her body earlier today, patient reports it came on suddenly, she started having some left facial droop and left-sided weakness, reports that she was fine this morning before the symptoms started, got concerned and decided to come to the hospital, patient denies any other complaints or problems, patient came in as a code stroke and was requested to be admitted to the hospitalist service , while in the ER patient had another episode of slurred speech and left-sided weakness, which resolved spontaneously     The patient denies any headache, blurry vision, sore throat, trouble swallowing, trouble with speech, chest pain, SOB, cough, fever, chills, N/V/D, abd pain, urinary symptoms, constipation, recent travels, sick contacts,  falls, injuries, rashes, contact with COVID-19 diagnosed patients, hematemesis, melena, hemoptysis, hematuria, rashes, denies starting any new medications and denies any other concerns or problems besides as mentioned above. Interval history / Subjective:   Patient seen and examined earlier this morning by me for follow up of TIA. The patient reports that she continues to have some weakness in her lower extremity, but that the other symptoms have resolved. I discussed the tonsillar mass and sinusitis with her and stressed outpatient follow up with ENT.      Assessment & Plan:     TIA  Statin  \ASA  Echocardiogram pending  MRI negative  CTs negative    Tonsillar mass  Outpatient follow up with ENT    Anemia  Monitor    Hyperlipidemia   Statin    Code status: Full  Prophylaxis: lovenox  Care Plan discussed with: patient, nurse, subspecialist  Anticipated Disposition: <24 hours     Hospital Problems  Date Reviewed: 6/15/2022            Codes Class Noted POA    TIA (transient ischemic attack) ICD-10-CM: G45.9  ICD-9-CM: 435.9  7/21/2022 Unknown             Review of Systems:   A comprehensive review of systems was negative except for that written in the HPI. Vital Signs:    Last 24hrs VS reviewed since prior progress note. Most recent are:  Visit Vitals  /77   Pulse 82   Temp 98 °F (36.7 °C)   Resp 15   Ht 5' 5\" (1.651 m)   Wt 130.7 kg (288 lb 2.3 oz)   SpO2 98%   BMI 47.95 kg/m²       No intake or output data in the 24 hours ending 07/22/22 1713     Physical Examination:     I had a face to face encounter with this patient and independently examined them on 7/22/2022 as outlined below:          Constitutional:  No acute distress, cooperative, pleasant    ENT:  Oral mucosa moist, oropharynx benign. Resp:  CTA bilaterally. No wheezing/rhonchi/rales. No accessory muscle use. CV:  Regular rhythm, normal rate, no murmurs, gallops, rubs    GI:  Soft, non distended, non tender. normoactive bowel sounds, no hepatosplenomegaly     Musculoskeletal:  No edema, warm, 2+ pulses throughout    Neurologic:  Moves all extremities.   AAOx3, CN II-XII reviewed            Data Review:    Review and/or order of clinical lab test  Review and/or order of tests in the radiology section of CPT  Review and/or order of tests in the medicine section of CPT      Labs:     Recent Labs     07/22/22  0618 07/21/22  0912   WBC 5.8 5.5   HGB 12.3 10.6*   HCT 38.8 33.3*    264     Recent Labs     07/22/22  0618 07/21/22  0912    141   K 3.6 3.5   * 112*   CO2 25 23   BUN 12 12   CREA 0.62 0.79   GLU 94 175*   CA 9.0 8.4*     Recent Labs 07/21/22 0912   ALT 12   AP 80   TBILI 0.5   TP 6.6   ALB 3.0*   GLOB 3.6     Recent Labs     07/21/22 0912   INR 1.0   PTP 10.6      No results for input(s): FE, TIBC, PSAT, FERR in the last 72 hours. No results found for: FOL, RBCF   No results for input(s): PH, PCO2, PO2 in the last 72 hours.   Recent Labs     07/22/22  0618 07/21/22  1627 07/21/22 0912    112 106     Lab Results   Component Value Date/Time    Cholesterol, total 206 (H) 07/22/2022 06:18 AM    HDL Cholesterol 85 07/22/2022 06:18 AM    LDL, calculated 105.6 (H) 07/22/2022 06:18 AM    Triglyceride 77 07/22/2022 06:18 AM    CHOL/HDL Ratio 2.4 07/22/2022 06:18 AM     No results found for: GLUCPOC  Lab Results   Component Value Date/Time    Color YELLOW/STRAW 07/21/2022 02:08 PM    Appearance CLEAR 07/21/2022 02:08 PM    Specific gravity 1.022 07/21/2022 02:08 PM    pH (UA) 7.0 07/21/2022 02:08 PM    Protein Negative 07/21/2022 02:08 PM    Glucose Negative 07/21/2022 02:08 PM    Ketone Negative 07/21/2022 02:08 PM    Bilirubin Negative 07/21/2022 02:08 PM    Urobilinogen 0.2 07/21/2022 02:08 PM    Nitrites Negative 07/21/2022 02:08 PM    Leukocyte Esterase Negative 07/21/2022 02:08 PM         Medications Reviewed:     Current Facility-Administered Medications   Medication Dose Route Frequency    LORazepam (ATIVAN) tablet 0.5 mg  0.5 mg Oral BID    metoprolol succinate (TOPROL-XL) XL tablet 75 mg  75 mg Oral DAILY    therapeutic multivitamin (THERAGRAN) tablet 1 Tablet  1 Tablet Oral DAILY    pantoprazole (PROTONIX) tablet 40 mg  40 mg Oral DAILY    acetaminophen (TYLENOL) tablet 650 mg  650 mg Oral Q4H PRN    Or    acetaminophen (TYLENOL) solution 650 mg  650 mg Per NG tube Q4H PRN    Or    acetaminophen (TYLENOL) suppository 650 mg  650 mg Rectal Q4H PRN    aspirin chewable tablet 81 mg  81 mg Oral DAILY    atorvastatin (LIPITOR) tablet 80 mg  80 mg Oral QHS    famotidine (PEPCID) tablet 20 mg  20 mg Oral Q12H    topiramate (TOPAMAX) tablet 50 mg  50 mg Oral BID    arformoteroL (BROVANA) neb solution 15 mcg  15 mcg Nebulization BID RT    And    budesonide (PULMICORT) 500 mcg/2 ml nebulizer suspension  500 mcg Nebulization BID RT    melatonin tablet 3 mg  3 mg Oral QHS PRN    cyclobenzaprine (FLEXERIL) tablet 5 mg  5 mg Oral TID PRN    montelukast (SINGULAIR) chewable tablet 10 mg  10 mg Oral DAILY     ______________________________________________________________________  EXPECTED LENGTH OF STAY: - - -  ACTUAL LENGTH OF STAY:          0                 Reji Quinn MD

## 2022-07-22 NOTE — PROGRESS NOTES
Pharmacist Review and Automatic Dose Adjustment of Prophylactic Enoxaparin    *Review reason for admission/hospital problem list*    The reviewing pharmacist has made an adjustment to the ordered enoxaparin dose or converted to UFH per the approved [facility]  protocol and table as identified below. Valeri Tang is a 49 y.o. female. No lab exists for component: CREATININE    Estimated Creatinine Clearance: 125.5 mL/min (by C-G formula based on SCr of 0.62 mg/dL).     Height:   Ht Readings from Last 1 Encounters:   07/22/22 165.1 cm (65\")     Weight:  Wt Readings from Last 1 Encounters:   07/22/22 130.7 kg (288 lb 2.3 oz)               Plan: Based upon the patient's weight and renal function, the ordered enoxaparin dose of 40mg q24 has been changed/converted to 30 mg q 12 h       Thank you,  KARENA Valente

## 2022-07-22 NOTE — PROGRESS NOTES
Problem: Self Care Deficits Care Plan (Adult)  Goal: *Acute Goals and Plan of Care (Insert Text)  Description: FUNCTIONAL STATUS PRIOR TO ADMISSION: Patient was independent and active without use of DME. Patient received occasional assist from daughter for basic and instrumental ADLs. HOME SUPPORT: The patient lived with daughter and grandchild and required occasional assist for ADLs. Occupational Therapy Goals  Initiated 7/22/2022   1. Patient will perform grooming standing with modified independence within 7 day(s). 2.  Patient will perform bathing with modified independence within 7 day(s). 3.  Patient will perform upper body dressing and lower body dressing with modified independence within 7 day(s). 4.  Patient will perform toilet transfers with modified independence within 7 day(s). 5.  Patient will perform all aspects of toileting with modified independence within 7 day(s). 6.  Patient will participate in upper extremity therapeutic exercise/activities with modified independence within 7 day(s). 7.  Patient will utilize energy conservation techniques during functional activities with verbal cues within 7 day(s). Outcome: Progressing Towards Goal     OCCUPATIONAL THERAPY EVALUATION  Patient: Harriet Siegel (55 y.o. female)  Date: 7/22/2022  Primary Diagnosis: TIA (transient ischemic attack) [G45.9]       Precautions:        ASSESSMENT  Based on the objective data described below, the patient presents with decreased independence with self care and functional mobility d/t pt report of decreased L sided weakness, coordination, strength and L visual tracking s/p admission for TIA, MRI and CT negative. Pt reports baseline occasional assist with ADLs from daughter and independent functional mobility. Pt received supine and agreeable to therapy. Pt completed bed mobility with supervision, functional mobility with CGA and ADLs with SBA-CGA.  Pt presented with inconsistent performance with dominant L hand. Pt presented functional deficits with objective measures of L sided decreased ROM , strength, and coordination however, with functional activities, little to no deficits noted. Pt reports increased difficulty with visual tracking to L, however no deficits noted in tracking assessment. Pt completed grooming standing at sink, toileting and LE dressing with SBA-CGA. Education provided on BE FAST, pt good carryover with verbal understanding. Discussed HHOT pt, declined. Will continue to follow during acute hospitalization. D/c recommendation is home with assist and no further OT needs at this time. Current Level of Function Impacting Discharge (ADLs/self-care): functional mobility with CGA and ADLs with SBA-CGA    Functional Outcome Measure: The patient scored 75/100 on the Barthel Index which is indicative of minimally independent with self care tasks . Other factors to consider for discharge: PLOF, PMH     Patient will benefit from skilled therapy intervention to address the above noted impairments. PLAN :  Recommendations and Planned Interventions: self care training, functional mobility training, therapeutic exercise, balance training, visual/perceptual training, therapeutic activities, cognitive retraining, endurance activities, neuromuscular re-education, patient education, home safety training, and family training/education    Frequency/Duration: Patient will be followed by occupational therapy 2 times a week to address goals. Recommendation for discharge: (in order for the patient to meet his/her long term goals)  No skilled occupational therapy/ follow up rehabilitation needs identified at this time.     This discharge recommendation:  Has been made in collaboration with the attending provider and/or case management    IF patient discharges home will need the following DME: patient owns DME required for discharge       SUBJECTIVE:   Patient stated I know what I need to do to take care of myself better.     OBJECTIVE DATA SUMMARY:   HISTORY:   Past Medical History:   Diagnosis Date    Arthritis     Asthma     Depression     Heart palpitations     Heart palpitations     Hiatal hernia     Hiatal hernia     Insomnia     Joint pain     Morbid obesity with BMI of 40.0-44.9, adult (HCC)     Morbid obesity with BMI of 45.0-49.9, adult (HCC)      Past Surgical History:   Procedure Laterality Date    HX  SECTION      HX CHOLECYSTECTOMY      HX KNEE ARTHROSCOPY      HX RHINOPLASTY       Expanded or extensive additional review of patient history:     Home Situation  Home Environment: Apartment  # Steps to Enter: 59  Rails to Enter: Yes  Hand Rails : Bilateral  One/Two Story Residence: One story  Living Alone: No  Support Systems: Child(cyn), Other Family Member(s)  Patient Expects to be Discharged to[de-identified] Home  Current DME Used/Available at Home: [address], [place], straight, Walker, rollator, Walker, rolling  Tub or Shower Type: Tub/Shower combination    Hand dominance: Left    EXAMINATION OF PERFORMANCE DEFICITS:  Cognitive/Behavioral Status:  Neurologic State: Alert  Orientation Level: Oriented X4  Cognition: Follows commands  Perception: Appears intact  Perseveration: No perseveration noted  Safety/Judgement: Awareness of environment    Skin: see nursing note    Edema: none noted    Hearing:   Auditory  Auditory Impairment: None    Vision/Perceptual:    Tracking: Able to track stimulus in all quadrants w/o difficulty (pt reports increased difficulty with L tracking however no consistent deficit noted)                      Acuity: Within Defined Limits    Corrective Lenses: Glasses    Range of Motion:    AROM: Generally decreased, functional (objective defict LUE>LLE)  PROM: Within functional limits                      Strength:    Strength: Generally decreased, functional (deficts L side> R; none noted during functional tasks)                Coordination:  Coordination: Generally decreased, functional (Objective assessmet: deficts LUE>LLE; functionally no deficits noted)  Fine Motor Skills-Upper: Left Intact; Right Intact    Gross Motor Skills-Upper: Left Intact; Right Intact    Tone & Sensation:    Tone: Normal  Sensation: Impaired (pt reports slight decrease in L side vs R, inconsistent report)                      Balance:  Sitting: Intact  Sitting - Static: Good (unsupported)  Sitting - Dynamic: Good (unsupported)  Standing: Impaired; Without support  Standing - Static: Good  Standing - Dynamic : Good;Constant support    Functional Mobility and Transfers for ADLs:  Bed Mobility:  Supine to Sit: Supervision  Scooting: Supervision    Transfers:  Sit to Stand: Contact guard assistance  Stand to Sit: Contact guard assistance  Bed to Chair: Contact guard assistance  Bathroom Mobility: Contact guard assistance  Toilet Transfer : Contact guard assistance    ADL Assessment:  Feeding: Independent    Oral Facial Hygiene/Grooming: Stand-by assistance    Bathing: Contact guard assistance         Upper Body Dressing: Supervision    Lower Body Dressing: Contact guard assistance    Toileting: Contact guard assistance                ADL Intervention and task modifications:       Grooming  Grooming Assistance: Stand-by assistance  Position Performed: Standing  Washing Hands: Stand-by assistance  Brushing Teeth: Stand-by assistance  Cues: Visual cues provided;Verbal cues provided; Tactile cues provided                        Lower Body Dressing Assistance  Dressing Assistance: Contact guard assistance  Pants With Elastic Waist: Contact guard assistance (simulated)  Socks: Supervision  Leg Crossed Method Used: Yes (for RLE)  Position Performed: Bending forward method;Seated edge of bed;Standing  Cues: Tactile cues provided;Verbal cues provided;Visual cues provided    Toileting  Toileting Assistance: Contact guard assistance  Bladder Hygiene: Independent  Clothing Management: Contact guard assistance  Cues: Verbal cues provided;Visual cues provided; Tactile cues provided    Cognitive Retraining  Safety/Judgement: Awareness of environment    Functional Measure:    Barthel Index:  Bathin  Bladder: 10  Bowels: 10  Groomin  Dressing: 10  Feeding: 10  Mobility: 10  Stairs: 0  Toilet Use: 10  Transfer (Bed to Chair and Back): 10  Total: 75/100      The Barthel ADL Index: Guidelines  1. The index should be used as a record of what a patient does, not as a record of what a patient could do. 2. The main aim is to establish degree of independence from any help, physical or verbal, however minor and for whatever reason. 3. The need for supervision renders the patient not independent. 4. A patient's performance should be established using the best available evidence. Asking the patient, friends/relatives and nurses are the usual sources, but direct observation and common sense are also important. However direct testing is not needed. 5. Usually the patient's performance over the preceding 24-48 hours is important, but occasionally longer periods will be relevant. 6. Middle categories imply that the patient supplies over 50 per cent of the effort. 7. Use of aids to be independent is allowed. Score Interpretation (from [address] [address])    Independent   60-79 Minimally independent   40-59 Partially dependent   20-39 Very dependent   <20 Totally dependent     -Rohit Cohen., Barthel, D.W. (1965). Functional evaluation: the Barthel Index. [facility] ([address] (1997). The Barthel activities of daily living index: self-reporting versus actual performance in the old (> or = 75 years). Journal of [address] [place] [address] 45(7), [facility], AaronAaronAaron, Tanika Justice., [place] Daniella. (1999). Measuring the change in disability after inpatient rehabilitation; comparison of the responsiveness of the Barthel Index and Functional [place] Measure.  Journal of Neurology, Neurosurgery, and Psychiatry, 66(4), [phone]. IVANIA Marquez, DAVE Ledezma, & Mercedes Balderas M.A. (2004) Assessment of post-stroke quality of life in cost-effectiveness studies: The usefulness of the Barthel Index and the EuroQoL-5D. Quality of Life Research, 15, 500-17           Occupational Therapy Evaluation Charge Determination   History Examination Decision-Making   LOW Complexity : Brief history review  MEDIUM Complexity : 3-5 performance deficits relating to physical, cognitive , or psychosocial skils that result in activity limitations and / or participation restrictions LOW Complexity : No comorbidities that affect functional and no verbal or physical assistance needed to complete eval tasks       Based on the above components, the patient evaluation is determined to be of the following complexity level: LOW   Pain Rating:  None reported    Activity Tolerance:   Good    After treatment patient left in no apparent distress:    Sitting in chair, Heels elevated for pressure relief, Call bell within reach, and Bed / chair alarm activated    COMMUNICATION/EDUCATION:   The patients plan of care was discussed with: Physical therapist and Registered nurse. Patient was educated regarding her deficit(s) as stated above as this relates to her diagnosis of TIA. She demonstrated Good understanding as evidenced by verbal understanding. Patient and/or family was verbally educated on the BE FAST acronym for signs/symptoms of CVA and TIA. BE FAST was written on patient's communication board  for visual education and reinforcement. All questions answered with patient indicating good understanding. Patient/family have participated as able in goal setting and plan of care. This patients plan of care is appropriate for delegation to [facility]. Thank you for this referral.  SEBLE Hart  Regarding student involvement in patient care:  A student participated in this treatment session.  Per CMS Medicare statements and AOTA guidelines I certify that the following was true:  1. I was present and directly observed the entire session. 2. I made all skilled judgments and clinical decisions regarding care. 3. I am the practitioner responsible for assessment, treatment, and documentation.     Time Calculation: 38 mins

## 2022-07-23 NOTE — CONSULTS
[address]    Name:  Corrine Lujan  MR#:  [id number]  :  1965  ACCOUNT #:  [de-identified]  DATE OF SERVICE:  2022      NEUROLOGY CONSULTATION    HISTORY OF PRESENT ILLNESS:  This is a 51-year-old left-handed female, who was admitted on 2022 after complaining of a sharp electric pain through her whole body. The history in the chart is quite different from the history that the patient tells me. According to the ED notes, she had change in speech and left arm and leg weakness while in the emergency department, and therefore, a Code Stroke was called. The patient tells me yesterday she was eating and noticed tension behind her eyes and then a pressure, she started to become anxious and worried about this and then all of a sudden had that sharp electrical tingling throughout her body. She was afraid that she was going to die and so she called 911. She recalls while talking to 911 that she just fell onto the sofa, she felt like her body was stuck. She could not walk at all, could not move, just stuck in one position. However, she was able to get up and go unlock the door for EMS. The patient does not mention any speech changes. No left-sided deficits. Even when specifically asked if she has any numbness, weakness, or speech changes, she never mentioned these. She has undergone CT of the head, which was unremarkable. CTA of the head and neck and CTP were unremarkable except for an incidental oropharyngeal mass with ENT recommending outpatient follow up. MRI of the brain is negative. Hemoglobin A1c is 5.2, .6. She was not on any antiplatelet agents or blood thinners at home. The patient does add that she was stuttering in the emergency department. The patient then reports ongoing left arm and leg weakness. PAST MEDICAL HISTORY:  1. COPD. 2.  Morbid obesity. 3.  Fibromyalgia. 4.  Depression. 5.  Hiatal hernia. 6.  Insomnia.   7. Quality 226: Preventive Care And Screening: Tobacco Use: Screening And Cessation Intervention: Patient screened for tobacco use and is an ex/non-smoker . 8.  Cholecystectomy. 9.  Knee surgery. 10.  Rhinoplasty. REVIEW OF SYSTEMS:  As per past medical history or HPI, otherwise reviewed and negative. MEDICATIONS AT HOME:  1. Topamax 50 mg b.i.d. for mood. 2.  Flexeril. 3.  Ativan. 4.  Voltaren. 5.  Protonix. 6.  Flonase. 7.  Singulair. 8.  Metoprolol. 9.  Ambien. 10.  ___  11. Multivitamin. 12.  Allegra-D. 13.  Symbicort. 14.  Norco.  15.  Albuterol. Here, she was started on aspirin 81 mg a day and Lipitor 80 mg a day. ALLERGIES:  ALLERGY TO PREDNISONE, AMOXICILLIN, AND AZITHROMYCIN. SOCIAL HISTORY:  She lives in [facility] with her daughter. She is not working. She is applying for disability for her fibromyalgia. No tobacco, alcohol, or drug use. FAMILY HISTORY:  Mom with CHF, osteoarthritis, hypertension, stroke, and MI with stenting. Father with MI, rheumatoid arthritis, and stroke. PHYSICAL EXAMINATION:  VITAL SIGNS:  Blood pressure 123/77, pulse 69, respiratory rate 13, temperature is 98.2, and saturating 98% on room air. BMI 47.9. GENERAL:  She is a well-nourished, well-developed, morbidly obese female, lying in bed, in no distress. HEART:  Her heart has a regular rate and rhythm without murmurs. CAROTIDS: 2+, no bruits. EXTREMITIES:  Warm. No edema. She has 2+ radial pulses. NEUROLOGICAL EXAMINATION:  Mental Status:  Alert and oriented. Speech:  Not dysarthric. Language:  Intact. Attention, memory, and fund of knowledge appropriate. Cranial Nerve Exam:  She has no facial asymmetry or ptosis. Extraocular eye movements are intact without diplopia or nystagmus. Visual fields are full. Pupils equally round and reactive. Tongue midline. Palate elevated symmetrically. Trapezius and sternocleidomastoid are 5/5. Strength, sensation, and hearing intact. Motor Exam:  The patient has a poor effort on the left with clear non-physiological findings including a positive Trammell's.   She demonstrates Detail Level: Detailed Quality 131: Pain Assessment And Follow-Up: Pain assessment using a standardized tool is documented as negative, no follow-up plan required inability to dorsiflex her left foot, however, when putting her foot back under the covers, she is able dorsiflex her foot. She has no pronator drift. No tremor. Sensory Exam:  She reported decreased to light touch and pinprick on the left. Coordination is intact to finger-to-nose, rapid alternating movements with heel-to-shin was intact on the right. With the left foot, she was able to raise her leg up, though she could not do this earlier, did not put it on top of her other leg, but ran it down the side of her other leg using her heel again demonstrating dorsiflexion. Reflexes were symmetric. Toes downgoing. Gait:  Not assessed at this time. LABORATORY DATA:  Studies and Reports: In addition to that mentioned above, hemoglobin is 10.6 and TSH is 4.81.    ASSESSMENT AND PLAN:  This is a 61-year-old left-handed female, presenting on 07/21/2022 with complaints of an the electric shock type feeling to her body that occurred after she developed tension or pressure behind her eyes which made her anxious and she felt like she was going to die and so she called 911. Describing herself feeling as if her whole body was stuck and she could not move, but was able to get up and unlock the door for EMS to get in. She continues to have left-sided arm and leg weakness, though never reported this in her initial history. It was noted in an ED note that she developed slurred speech and left-sided deficits. Exam with clear non-physiological features and imaging studies are unremarkable. I have low suspicion for any concerning neurological etiology for her complaints. Her echo has been done, but the result is still pending. If this is unremarkable, she would be stable for discharge from a neuro standpoint. I would continue her on her 81 mg aspirin and decrease the Lipitor to 40 mg a day given the LDL is only 105 and she has no other stroke risk factors.   This was discussed with the hospitalist caring for the patient.         Svetlana Moncada MD      [id number]  D:  07/23/2022 14:20  T:  07/23/2022 15:54  JOB #:  [id number]

## 2022-08-02 ENCOUNTER — TELEPHONE (OUTPATIENT)
Dept: FAMILY MEDICINE CLINIC | Age: 57
End: 2022-08-02

## 2022-08-02 NOTE — TELEPHONE ENCOUNTER
----- Message from [facility] KAMLESH Maza sent at 7/29/2022  8:22 AM EDT -----  Subject: Hospital Follow Up    QUESTIONS  What hospital was the Patient Discharged from? [facility]   Date of Discharge? 2022-07-22  Discharge Location? Home  Reason for hospitalization as patient stated? The pt stated she was   admitted into the hospital on 7/21/22 because she had two TIA's and was   informed that she needed a follow up within a week. What question does the patient have, if applicable?   ---------------------------------------------------------------------------  --------------  CALL BACK INFO  What is the best way for the office to contact you? OK to leave message on   voicemail  Preferred Call Back Phone Number? [phone]  ---------------------------------------------------------------------------  --------------  SCRIPT ANSWERS  Relationship to Patient? Self  (Patient requests to see provider urgently. )? No  (Has the patient been discharged from the hospital within 2 business days   AND does not have a Telephone Encounter  Follow Up From [address] [place] [address]   documented in [address]?)? Yes  Do you have any questions for your primary care provider that need to be   answered prior to your appointment? (Use RN Triage if question pertains to   anything on the red flag list)? No  (Patient needs follow up visit after hospital discharge) Book first   available appointment within 7 days OF DISCHARGE, if no appt, proceed to   book the next available time slot within 14 days OF DISCHARGE AND Send   Message to Provider. [facility] Follow Up appointment cannot be booked   beyond 14 Days and should result in a Message to Provider. ?  Yes

## 2022-08-02 NOTE — TELEPHONE ENCOUNTER
Spoke with pt she is scheduled to see VIOLETA Mendez on 8/5/22 @ 9:45 am for her TIA from [facility].

## 2022-08-05 ENCOUNTER — OFFICE VISIT (OUTPATIENT)
Dept: FAMILY MEDICINE CLINIC | Age: 57
End: 2022-08-05
Payer: MEDICAID

## 2022-08-05 VITALS
BODY MASS INDEX: 46.32 KG/M2 | HEART RATE: 88 BPM | DIASTOLIC BLOOD PRESSURE: 60 MMHG | SYSTOLIC BLOOD PRESSURE: 120 MMHG | HEIGHT: 65 IN | TEMPERATURE: 98 F | WEIGHT: 278 LBS | RESPIRATION RATE: 16 BRPM | OXYGEN SATURATION: 99 %

## 2022-08-05 DIAGNOSIS — Z13.220 SCREENING, LIPID: ICD-10-CM

## 2022-08-05 DIAGNOSIS — E03.9 ACQUIRED HYPOTHYROIDISM: ICD-10-CM

## 2022-08-05 DIAGNOSIS — Z76.89 ENCOUNTER FOR SUPPORT AND COORDINATION OF TRANSITION OF CARE: Primary | ICD-10-CM

## 2022-08-05 DIAGNOSIS — R79.89 ELEVATED TSH: ICD-10-CM

## 2022-08-05 DIAGNOSIS — R10.30 LOWER ABDOMINAL PAIN: ICD-10-CM

## 2022-08-05 DIAGNOSIS — R10.13 EPIGASTRIC PAIN: ICD-10-CM

## 2022-08-05 DIAGNOSIS — G45.9 TIA (TRANSIENT ISCHEMIC ATTACK): ICD-10-CM

## 2022-08-05 DIAGNOSIS — R60.0 PEDAL EDEMA: ICD-10-CM

## 2022-08-05 LAB
ALBUMIN SERPL-MCNC: 4 G/DL (ref 3.5–5)
ALBUMIN/GLOB SERPL: 1.1 {RATIO} (ref 1.1–2.2)
ALP SERPL-CCNC: 94 U/L (ref 45–117)
ALT SERPL-CCNC: 21 U/L (ref 12–78)
ANION GAP SERPL CALC-SCNC: 7 MMOL/L (ref 5–15)
AST SERPL-CCNC: 20 U/L (ref 15–37)
BILIRUB SERPL-MCNC: 0.7 MG/DL (ref 0.2–1)
BUN SERPL-MCNC: 13 MG/DL (ref 6–20)
BUN/CREAT SERPL: 17 (ref 12–20)
CALCIUM SERPL-MCNC: 9.4 MG/DL (ref 8.5–10.1)
CHLORIDE SERPL-SCNC: 109 MMOL/L (ref 97–108)
CHOLEST SERPL-MCNC: 145 MG/DL
CO2 SERPL-SCNC: 25 MMOL/L (ref 21–32)
CREAT SERPL-MCNC: 0.76 MG/DL (ref 0.55–1.02)
GLOBULIN SER CALC-MCNC: 3.6 G/DL (ref 2–4)
GLUCOSE SERPL-MCNC: 99 MG/DL (ref 65–100)
HDLC SERPL-MCNC: 83 MG/DL
HDLC SERPL: 1.7 {RATIO} (ref 0–5)
LDLC SERPL CALC-MCNC: 51.2 MG/DL (ref 0–100)
POTASSIUM SERPL-SCNC: 4.2 MMOL/L (ref 3.5–5.1)
PROT SERPL-MCNC: 7.6 G/DL (ref 6.4–8.2)
SODIUM SERPL-SCNC: 141 MMOL/L (ref 136–145)
T4 FREE SERPL-MCNC: 1 NG/DL (ref 0.8–1.5)
TRIGL SERPL-MCNC: 54 MG/DL (ref ?–150)
TSH SERPL DL<=0.05 MIU/L-ACNC: 1.98 UIU/ML (ref 0.36–3.74)
VLDLC SERPL CALC-MCNC: 10.8 MG/DL

## 2022-08-05 PROCEDURE — 99213 OFFICE O/P EST LOW 20 MIN: CPT | Performed by: NURSE PRACTITIONER

## 2022-08-05 RX ORDER — BUMETANIDE 2 MG/1
2 TABLET ORAL DAILY
Qty: 90 TABLET | Refills: 1 | Status: SHIPPED | OUTPATIENT
Start: 2022-08-05

## 2022-08-05 RX ORDER — AA/PROT/LYSINE/METHIO/VIT C/B6 50-12.5 MG
TABLET ORAL
COMMUNITY

## 2022-08-05 RX ORDER — CHOLECALCIFEROL (VITAMIN D3) 50 MCG
CAPSULE ORAL
COMMUNITY

## 2022-08-05 NOTE — PROGRESS NOTES
Chief Complaint   Patient presents with    Hospital Follow Up     1. \"Have you been to the ER, urgent care clinic since your last visit? Hospitalized since your last visit? \" YES TIA x3     2. \"Have you seen or consulted any other health care providers outside of the [address] [place] [address] since your last visit? \"  Neurology and ENT    3. For patients aged 39-70: Has the patient had a colonoscopy / FIT/ Cologuard? Has gap no       If the patient is female:    4. For patients aged 41-77: Has the patient had a mammogram within the past 2 years? Has gap  due now - declined       5. For patients aged 21-65: Has the patient had a pap smear?    Has gap - no longer required  Had Partial Hyst

## 2022-08-09 LAB — H PYLORI IGM SER-ACNC: <9 UNITS (ref 0–8.9)

## 2022-08-10 LAB — H PYLORI IGG SER IA-ACNC: 0.21 INDEX VALUE (ref 0–0.79)

## 2022-08-23 RX ORDER — ALBUTEROL SULFATE 90 UG/1
2 AEROSOL, METERED RESPIRATORY (INHALATION)
Qty: 18 G | Refills: 3 | Status: SHIPPED | OUTPATIENT
Start: 2022-08-23

## 2022-08-23 NOTE — TELEPHONE ENCOUNTER
Last Visit: 8/5/22 VIOLETA Mendez  Next Appointment: None  Previous Refill Encounter(s): 3/8/22 1 + 3    Requested Prescriptions     Pending Prescriptions Disp Refills    albuterol (PROVENTIL HFA, VENTOLIN HFA, PROAIR HFA) 90 mcg/actuation inhaler 18 g 3     Sig: Take 2 Puffs by inhalation every four (4) hours as needed for Wheezing. For [address] [facility] in place:   Recommendation Provided To:    Intervention Detail: New Rx: 1, reason: Patient Preference  Gap Closed?:   Intervention Accepted By:   Time Spent (min): 5

## 2022-08-23 NOTE — TELEPHONE ENCOUNTER
Patient call stating she needs new rx for lipitor as the hospital physician gave 30 d/s after TIA. Thanks, Saba Mazariegos    Last Visit: 8/5/22 NP Vanessa  Next Appointment: None  Previous Refill Encounter(s): 7/22/22 30    Requested Prescriptions     Pending Prescriptions Disp Refills    atorvastatin (LIPITOR) 40 mg tablet 90 Tablet 1     Sig: Take 1 Tablet by mouth nightly. For [address] [facility] in place:   Recommendation Provided To:    Intervention Detail: New Rx: 1, reason: Patient Preference  Gap Closed?:   Intervention Accepted By:   Time Spent (min): 5

## 2022-08-24 RX ORDER — ATORVASTATIN CALCIUM 40 MG/1
40 TABLET, FILM COATED ORAL
Qty: 90 TABLET | Refills: 1 | Status: SHIPPED | OUTPATIENT
Start: 2022-08-24

## 2022-09-06 RX ORDER — FLUTICASONE PROPIONATE 50 MCG
SPRAY, SUSPENSION (ML) NASAL
Qty: 1 EACH | Refills: 0 | Status: SHIPPED | OUTPATIENT
Start: 2022-09-06 | End: 2022-10-21 | Stop reason: SDUPTHER

## 2022-09-06 NOTE — TELEPHONE ENCOUNTER
Last visit 08/05/2022 NP [address]   Next appointment Nothing scheduled   Previous refill encounter(s)   03/08/2022 Flonase #1 with 3 refills.      For Pharmacy Admin Tracking Only    Intervention Detail: New Rx: 1, reason: Patient Preference  Time Spent (min): 5      Requested Prescriptions     Pending Prescriptions Disp Refills    fluticasone propionate (FLONASE) 50 mcg/actuation nasal spray 1 Each 0     Sig: SPRAY 2 SPRAYS IN EACH NOSTRIL ONCE DAILY

## 2022-09-13 RX ORDER — MONTELUKAST SODIUM 10 MG/1
10 TABLET ORAL DAILY
Qty: 90 TABLET | Refills: 0 | Status: SHIPPED | OUTPATIENT
Start: 2022-09-13

## 2022-09-13 NOTE — TELEPHONE ENCOUNTER
Last visit 08/05/2022 NP [address]   Next appointment Nothing scheduled   Previous refill encounter(s)   03/08/2022 Singulair #30 with 3 refills. For Pharmacy Admin Tracking Only    Intervention Detail: New Rx: 1, reason: Patient Preference  Time Spent (min): 5      Requested Prescriptions     Pending Prescriptions Disp Refills    montelukast (SINGULAIR) 10 mg tablet 90 Tablet 0     Sig: Take 1 Tablet by mouth daily.

## 2022-09-16 NOTE — TELEPHONE ENCOUNTER
Last Visit: 8/5/22 VIOLETA Mendez  Next Appointment: None  Previous Refill Encounter(s): 6/17/22 90    Requested Prescriptions     Pending Prescriptions Disp Refills    cyclobenzaprine (FLEXERIL) 5 mg tablet 90 Tablet 0     Sig: Take 1 Tablet by mouth nightly. For [address] [facility] in place:   Recommendation Provided To:    Intervention Detail: New Rx: 1, reason: Patient Preference  Gap Closed?:   Intervention Accepted By:   Time Spent (min): 5

## 2022-09-17 DIAGNOSIS — I10 BENIGN ESSENTIAL HTN: ICD-10-CM

## 2022-09-17 DIAGNOSIS — I20.0 UNSTABLE ANGINA (HCC): ICD-10-CM

## 2022-09-17 DIAGNOSIS — R00.2 HEART PALPITATIONS: Primary | ICD-10-CM

## 2022-09-18 RX ORDER — CYCLOBENZAPRINE HCL 5 MG
5 TABLET ORAL
Qty: 90 TABLET | Refills: 0 | Status: SHIPPED | OUTPATIENT
Start: 2022-09-18

## 2022-09-20 RX ORDER — METOPROLOL SUCCINATE 50 MG/1
TABLET, EXTENDED RELEASE ORAL
Qty: 45 TABLET | Refills: 0 | Status: SHIPPED | OUTPATIENT
Start: 2022-09-20

## 2022-10-14 DIAGNOSIS — F51.01 PRIMARY INSOMNIA: ICD-10-CM

## 2022-10-14 NOTE — TELEPHONE ENCOUNTER
Patient call stating she needs refill of zolpidem. Check . Benito columba    Last Visit: 8/5/22 VIOLETA Mendez  Next Appointment: 10/19/22 VIOLETA Mendez  Previous Refill Encounter(s): 11/23/21 90    Requested Prescriptions     Pending Prescriptions Disp Refills    zolpidem (AMBIEN) 10 mg tablet 90 Tablet 0     Sig: TAKE ONE TABLET BY MOUTH EVERY NIGHT AT BEDTIME AS NEEDED FOR SLEEP *MAXIMUM OF 1 TABLET DAILY*       For Pharmacy Admin Tracking Only    CPA in place:   Recommendation Provided To:    Intervention Detail: New Rx: 1, reason: Patient Preference  Gap Closed?:   Intervention Accepted By:   Time Spent (min): 10

## 2022-10-15 RX ORDER — ZOLPIDEM TARTRATE 10 MG/1
TABLET ORAL
Qty: 90 TABLET | Refills: 0 | Status: SHIPPED | OUTPATIENT
Start: 2022-10-15

## 2022-10-19 ENCOUNTER — OFFICE VISIT (OUTPATIENT)
Dept: FAMILY MEDICINE CLINIC | Age: 57
End: 2022-10-19
Payer: MEDICAID

## 2022-10-19 VITALS
DIASTOLIC BLOOD PRESSURE: 80 MMHG | BODY MASS INDEX: 46.15 KG/M2 | OXYGEN SATURATION: 100 % | HEIGHT: 65 IN | SYSTOLIC BLOOD PRESSURE: 123 MMHG | TEMPERATURE: 97.9 F | HEART RATE: 81 BPM | WEIGHT: 277 LBS | RESPIRATION RATE: 16 BRPM

## 2022-10-19 DIAGNOSIS — R21 RASH: ICD-10-CM

## 2022-10-19 DIAGNOSIS — L65.9 HAIR LOSS: Primary | ICD-10-CM

## 2022-10-19 PROCEDURE — 99213 OFFICE O/P EST LOW 20 MIN: CPT | Performed by: NURSE PRACTITIONER

## 2022-10-19 RX ORDER — BUTALBITAL, ACETAMINOPHEN AND CAFFEINE 50; 325; 40 MG/1; MG/1; MG/1
2 TABLET ORAL
COMMUNITY
Start: 2021-11-11

## 2022-10-19 RX ORDER — TRIAMCINOLONE ACETONIDE 1 MG/G
CREAM TOPICAL 2 TIMES DAILY
Qty: 80 G | Refills: 0 | Status: SHIPPED | OUTPATIENT
Start: 2022-10-19

## 2022-10-19 RX ORDER — FEXOFENADINE HCL 60 MG
60 TABLET ORAL
COMMUNITY

## 2022-10-19 NOTE — PROGRESS NOTES
[facility] Note  Subjective:      Husam Lin is a 56 y.o. female who presents for hair loss and started this Summar. She also reports rash on her back and thighs after taking fish oil two months ago, it itches at times. She has history of Morbid obesity, arthritis, anxiety, depression and insomnia. Past Medical History:   Diagnosis Date    Arthritis     Asthma     Depression     Heart palpitations     Heart palpitations     Hiatal hernia     Hiatal hernia     Insomnia     Joint pain     Morbid obesity with BMI of 40.0-44.9, adult ([facility] [address]     Morbid obesity with BMI of 45.0-49.9, adult ([facility] [address]      Past Surgical History:   Procedure Laterality Date    HX  SECTION      HX CHOLECYSTECTOMY      HX KNEE ARTHROSCOPY      HX RHINOPLASTY       Current Outpatient Medications   Medication Sig Dispense Refill    butalbital-acetaminophen-caffeine (FIORICET, ESGIC) -40 mg per tablet 2 Tablets. fexofenadine (ALLEGRA) 60 mg tablet 60 mg.      triamcinolone acetonide (KENALOG) 0.1 % topical cream Apply  to affected area two (2) times a day. use thin layer 80 g 0    zolpidem (AMBIEN) 10 mg tablet TAKE ONE TABLET BY MOUTH EVERY NIGHT AT BEDTIME AS NEEDED FOR SLEEP *MAXIMUM OF 1 TABLET DAILY* 90 Tablet 0    metoprolol succinate (TOPROL-XL) 50 mg XL tablet TAKE ONE AND ONE-HALF TABLETS BY MOUTH DAILY. 45 Tablet 0    cyclobenzaprine (FLEXERIL) 5 mg tablet Take 1 Tablet by mouth nightly. 90 Tablet 0    montelukast (SINGULAIR) 10 mg tablet Take 1 Tablet by mouth daily. 90 Tablet 0    fluticasone propionate (FLONASE) 50 mcg/actuation nasal spray SPRAY 2 SPRAYS IN EACH NOSTRIL ONCE DAILY 1 Each 0    atorvastatin (LIPITOR) 40 mg tablet Take 1 Tablet by mouth nightly. 90 Tablet 1    albuterol (PROVENTIL HFA, VENTOLIN HFA, PROAIR HFA) 90 mcg/actuation inhaler Take 2 Puffs by inhalation every four (4) hours as needed for Wheezing. 18 g 3    coenzyme q10 10 mg cap Take  by mouth. bumetanide (BUMEX) 2 mg tablet Take 1 Tablet by mouth in the morning. 90 Tablet 1    aspirin 81 mg chewable tablet Take 1 Tablet by mouth in the morning. 30 Tablet 0    pantoprazole (PROTONIX) 40 mg tablet Take 1 Tablet by mouth daily. 90 Tablet 1    topiramate (TOPAMAX) 50 mg tablet Take 50 mg by mouth two (2) times a day. budesonide/glycopyr/formoterol (BREZTRI AEROSPHERE IN) Take  by inhalation. multivitamin (ONE A DAY) tablet Take 1 Tab by mouth daily. fexofenadine-pseudoephedrine (ALLEGRA-D)  mg Tb12 Take 1 Tab by mouth every twelve (12) hours. budesonide-formoteroL (SYMBICORT) 160-4.5 mcg/actuation HFAA Take 2 Puffs by inhalation. HYDROcodone-acetaminophen (NORCO) 5-325 mg per tablet Take 2 Tabs by mouth every four (4) hours as needed for Pain. omega 3-dha-epa-fish oil (Fish OiL) 100-160-1,000 mg cap Take  by mouth. (Patient not taking: Reported on 10/19/2022)      LORazepam (ATIVAN) 0.5 mg tablet  (Patient not taking: Reported on 10/19/2022)       Allergies   Allergen Reactions    Prednisone Other (comments)    Amoxicillin Rash    Azithromycin Rash       ROS:   Complete review of systems was reviewed with pertinent information listed in HPI. Review of Systems   Constitutional: Negative. HENT: Negative. Respiratory: Negative. Cardiovascular: Negative. Gastrointestinal: Negative. Genitourinary: Negative. Musculoskeletal: Negative. Skin:  Positive for itching and rash. Objective:   Visit Vitals  /80 (BP 1 Location: Right arm, BP Patient Position: Sitting, BP Cuff Size: Adult)   Pulse 81   Temp 97.9 °F (36.6 °C) (Oral)   Resp 16   Ht 5' 5\" (1.651 m)   Wt 277 lb (125.6 kg)   SpO2 100%   BMI 46.10 kg/m²       Vitals and Nurse Documentation reviewed. Physical Exam  Constitutional:       Appearance: Normal appearance. She is obese.    HENT:      Mouth/Throat:      Mouth: Mucous membranes are moist.   Cardiovascular:      Rate and Rhythm: Normal rate and regular rhythm. Pulses: Normal pulses. Heart sounds: Normal heart sounds. No murmur heard. Pulmonary:      Breath sounds: Normal breath sounds. Abdominal:      General: Bowel sounds are normal.      Palpations: Abdomen is soft. Musculoskeletal:      Cervical back: Normal range of motion and neck supple. Skin:     Comments: Macular discrete rash on her lower back and upper thighs    Neurological:      Mental Status: She is alert. Psychiatric:         Mood and Affect: Mood normal.       Assessment/Plan:     Diagnoses and all orders for this visit:    1. Hair loss  -     REFERRAL TO DERMATOLOGY    2. Rash  -     triamcinolone acetonide (KENALOG) 0.1 % topical cream; Apply  to affected area two (2) times a day. use thin layer          Pt expressed understanding with the diagnosis and plan        Discussed expected course/resolution/complications of diagnosis in detail with patient. Medication risks/benefits/costs/interactions/alternatives discussed with patient. Pt was given an after visit summary which includes diagnoses, current medications & vitals.   Pt expressed understanding with the diagnosis and plan

## 2022-10-19 NOTE — PROGRESS NOTES
Chief Complaint   Patient presents with    Rash     All over body and hair is falling out. 1. \"Have you been to the ER, urgent care clinic since your last visit? Hospitalized since your last visit? \" Yes Smita Doctor 7/2022 Strokes Symptoms     2. \"Have you seen or consulted any other health care providers outside of the [address] [place] [address] since your last visit? \" Yes 7/2022       3. For patients aged 39-70: Has the patient had a colonoscopy / FIT/ Cologuard? No      If the patient is female:    4. For patients aged 41-77: Has the patient had a mammogram within the past 2 years? Yes - no Care Gap present      5. For patients aged 21-65: Has the patient had a pap smear?  NA - based on age or sex         3 most recent PHQ Screens 10/19/2022   PHQ Not Done -   Little interest or pleasure in doing things Several days   Feeling down, depressed, irritable, or hopeless Several days   Total Score PHQ 2 2   Trouble falling or staying asleep, or sleeping too much -   Feeling tired or having little energy -   Poor appetite, weight loss, or overeating -   Feeling bad about yourself - or that you are a failure or have let yourself or your family down -   Trouble concentrating on things such as school, work, reading, or watching TV -   Moving or speaking so slowly that other people could have noticed; or the opposite being so fidgety that others notice -   Thoughts of being better off dead, or hurting yourself in some way -   PHQ 9 Score -   How difficult have these problems made it for you to do your work, take care of your home and get along with others -       Health Maintenance Due   Topic Date Due    DTaP/Tdap/Td series (1 - Tdap) Never done    Cervical cancer screen  Never done    Colorectal Cancer Screening Combo  Never done    Shingrix Vaccine Age 50> (1 of 2) Never done    COVID-19 Vaccine (4 - Booster for Moderna series) 03/30/2022    Flu Vaccine (1) 08/01/2022

## 2022-10-21 RX ORDER — FLUTICASONE PROPIONATE 50 MCG
SPRAY, SUSPENSION (ML) NASAL
Qty: 1 EACH | Refills: 0 | Status: SHIPPED | OUTPATIENT
Start: 2022-10-21

## 2022-10-21 NOTE — TELEPHONE ENCOUNTER
Pharmacy sent fax requesting a refill on patients fluticasone spray. Requested Prescriptions     Pending Prescriptions Disp Refills    fluticasone propionate (FLONASE) 50 mcg/actuation nasal spray 1 Each 0     Sig: SPRAY 2 SPRAYS IN EACH NOSTRIL ONCE DAILY       For Pharmacy Admin Tracking Only    CPA in place:   Recommendation Provided To:    Intervention Detail: New Rx: 1, reason: Patient Preference  Gap Closed?:   Intervention Accepted By:   Time Spent (min): 5

## 2022-11-07 ENCOUNTER — TRANSCRIBE ORDER (OUTPATIENT)
Dept: GENERAL RADIOLOGY | Age: 57
End: 2022-11-07

## 2022-11-07 ENCOUNTER — HOSPITAL ENCOUNTER (OUTPATIENT)
Dept: GENERAL RADIOLOGY | Age: 57
Discharge: HOME | End: 2022-11-07
Payer: MEDICAID

## 2022-11-07 DIAGNOSIS — M47.816 LUMBAR SPONDYLOSIS: ICD-10-CM

## 2022-11-07 DIAGNOSIS — M47.816 LUMBAR SPONDYLOSIS: Primary | ICD-10-CM

## 2022-11-07 PROCEDURE — 72110 X-RAY EXAM L-2 SPINE 4/>VWS: CPT

## 2022-11-12 DIAGNOSIS — I10 BENIGN ESSENTIAL HTN: ICD-10-CM

## 2022-11-12 DIAGNOSIS — R00.2 HEART PALPITATIONS: ICD-10-CM

## 2022-11-12 DIAGNOSIS — I20.0 UNSTABLE ANGINA (HCC): ICD-10-CM

## 2022-11-16 RX ORDER — METOPROLOL SUCCINATE 50 MG/1
TABLET, EXTENDED RELEASE ORAL
Qty: 45 TABLET | Refills: 0 | OUTPATIENT
Start: 2022-11-16

## 2022-11-16 NOTE — TELEPHONE ENCOUNTER
Per last refill, patient needs appt for further refills.     LOV: 11/19/2021    Future Appointments   Date Time Provider Hermes Neil   12/13/2022  9:30 AM Donnie Casanova DO [facility]       Requested Prescriptions     Refused Prescriptions Disp Refills    metoprolol succinate (TOPROL-XL) 50 mg XL tablet [Pharmacy Med Name: METOPROLOL SUCC ER 50MG TAB, UU] 45 Tablet 0     Sig: TAKE 1 AND 1/2 TABLETS BY MOUTH DAILY (MUST MAKE APPOINTMENT FOR FURTHER REFILLS)     Refused By: Manav Soares     Reason for Refusal: Appt required, please call patient

## 2022-12-12 ENCOUNTER — TRANSCRIBE ORDER (OUTPATIENT)
Dept: GENERAL RADIOLOGY | Age: 57
End: 2022-12-12

## 2022-12-12 ENCOUNTER — HOSPITAL ENCOUNTER (OUTPATIENT)
Dept: GENERAL RADIOLOGY | Age: 57
Discharge: HOME | End: 2022-12-12
Attending: PAIN MEDICINE
Payer: MEDICAID

## 2022-12-12 DIAGNOSIS — M47.812 CERVICAL SPONDYLOSIS: ICD-10-CM

## 2022-12-12 DIAGNOSIS — M47.812 CERVICAL SPONDYLOSIS: Primary | ICD-10-CM

## 2022-12-12 PROCEDURE — 72040 X-RAY EXAM NECK SPINE 2-3 VW: CPT

## 2022-12-16 RX ORDER — MONTELUKAST SODIUM 10 MG/1
10 TABLET ORAL DAILY
Qty: 90 TABLET | Refills: 0 | Status: SHIPPED | OUTPATIENT
Start: 2022-12-16

## 2022-12-16 NOTE — TELEPHONE ENCOUNTER
Pt left a voice message requesting a refill. [phone]    Last visit 10/19/2022 VIOLETA Contreras   Next appointment Nothing scheduled   Previous refill encounter(s)   09/13/2022 Singulair #90     For Pharmacy Admin Tracking Only    Intervention Detail: New Rx: 1, reason: Patient Preference  Time Spent (min): 5      Requested Prescriptions     Pending Prescriptions Disp Refills    montelukast (SINGULAIR) 10 mg tablet 90 Tablet 0     Sig: Take 1 Tablet by mouth daily.

## 2022-12-19 RX ORDER — CYCLOBENZAPRINE HCL 5 MG
5 TABLET ORAL
Qty: 90 TABLET | Refills: 0 | Status: SHIPPED | OUTPATIENT
Start: 2022-12-19

## 2022-12-19 NOTE — TELEPHONE ENCOUNTER
Last Visit: 10/19/22 [facility]  Next Appointment: None  Previous Refill Encounter(s): 9/18/22 90    Requested Prescriptions     Pending Prescriptions Disp Refills    cyclobenzaprine (FLEXERIL) 5 mg tablet 90 Tablet 0     Sig: Take 1 Tablet by mouth nightly. For [facility] in place:   Recommendation Provided To:    Intervention Detail: New Rx: 1, reason: Patient Preference  Gap Closed?:   Intervention Accepted By:   Time Spent (min): 5

## 2023-01-10 RX ORDER — FLUTICASONE PROPIONATE 50 MCG
SPRAY, SUSPENSION (ML) NASAL
Qty: 1 EACH | Refills: 5 | Status: SHIPPED | OUTPATIENT
Start: 2023-01-10

## 2023-01-16 DIAGNOSIS — F51.01 PRIMARY INSOMNIA: ICD-10-CM

## 2023-01-17 RX ORDER — ZOLPIDEM TARTRATE 10 MG/1
TABLET ORAL
Qty: 90 TABLET | Refills: 0 | Status: SHIPPED | OUTPATIENT
Start: 2023-01-17

## 2023-06-06 ENCOUNTER — OFFICE VISIT (OUTPATIENT)
Dept: PRIMARY CARE CLINIC | Facility: CLINIC | Age: 58
End: 2023-06-06
Payer: MEDICARE

## 2023-06-06 VITALS
TEMPERATURE: 98.5 F | HEIGHT: 65 IN | HEART RATE: 86 BPM | OXYGEN SATURATION: 100 % | BODY MASS INDEX: 38.12 KG/M2 | SYSTOLIC BLOOD PRESSURE: 119 MMHG | RESPIRATION RATE: 17 BRPM | WEIGHT: 228.8 LBS | DIASTOLIC BLOOD PRESSURE: 75 MMHG

## 2023-06-06 DIAGNOSIS — Z12.11 SCREEN FOR COLON CANCER: Primary | ICD-10-CM

## 2023-06-06 DIAGNOSIS — M79.7 FIBROMYALGIA: ICD-10-CM

## 2023-06-06 DIAGNOSIS — Z86.73 HISTORY OF TIA (TRANSIENT ISCHEMIC ATTACK): ICD-10-CM

## 2023-06-06 DIAGNOSIS — B35.4 TINEA CORPORIS: ICD-10-CM

## 2023-06-06 DIAGNOSIS — F51.01 PRIMARY INSOMNIA: ICD-10-CM

## 2023-06-06 PROBLEM — F33.2 DEPRESSION, MAJOR, SEVERE RECURRENCE (HCC): Status: RESOLVED | Noted: 2020-11-25 | Resolved: 2023-06-06

## 2023-06-06 PROCEDURE — 99214 OFFICE O/P EST MOD 30 MIN: CPT | Performed by: FAMILY MEDICINE

## 2023-06-06 PROCEDURE — 3017F COLORECTAL CA SCREEN DOC REV: CPT | Performed by: FAMILY MEDICINE

## 2023-06-06 PROCEDURE — 4004F PT TOBACCO SCREEN RCVD TLK: CPT | Performed by: FAMILY MEDICINE

## 2023-06-06 PROCEDURE — G8427 DOCREV CUR MEDS BY ELIG CLIN: HCPCS | Performed by: FAMILY MEDICINE

## 2023-06-06 PROCEDURE — G8417 CALC BMI ABV UP PARAM F/U: HCPCS | Performed by: FAMILY MEDICINE

## 2023-06-06 RX ORDER — MONTELUKAST SODIUM 10 MG/1
10 TABLET ORAL DAILY
Qty: 90 TABLET | Refills: 1 | Status: SHIPPED | OUTPATIENT
Start: 2023-06-06

## 2023-06-06 RX ORDER — KETOCONAZOLE 20 MG/G
CREAM TOPICAL
Qty: 30 G | Refills: 1 | Status: SHIPPED | OUTPATIENT
Start: 2023-06-06

## 2023-06-06 RX ORDER — ZOLPIDEM TARTRATE 10 MG/1
10 TABLET ORAL NIGHTLY PRN
Qty: 90 TABLET | Refills: 1 | Status: SHIPPED | OUTPATIENT
Start: 2023-06-06 | End: 2023-12-03

## 2023-06-06 SDOH — ECONOMIC STABILITY: FOOD INSECURITY: WITHIN THE PAST 12 MONTHS, YOU WORRIED THAT YOUR FOOD WOULD RUN OUT BEFORE YOU GOT MONEY TO BUY MORE.: PATIENT DECLINED

## 2023-06-06 SDOH — ECONOMIC STABILITY: FOOD INSECURITY: WITHIN THE PAST 12 MONTHS, THE FOOD YOU BOUGHT JUST DIDN'T LAST AND YOU DIDN'T HAVE MONEY TO GET MORE.: PATIENT DECLINED

## 2023-06-06 SDOH — ECONOMIC STABILITY: INCOME INSECURITY: HOW HARD IS IT FOR YOU TO PAY FOR THE VERY BASICS LIKE FOOD, HOUSING, MEDICAL CARE, AND HEATING?: PATIENT DECLINED

## 2023-06-06 SDOH — ECONOMIC STABILITY: HOUSING INSECURITY
IN THE LAST 12 MONTHS, WAS THERE A TIME WHEN YOU DID NOT HAVE A STEADY PLACE TO SLEEP OR SLEPT IN A SHELTER (INCLUDING NOW)?: PATIENT REFUSED

## 2023-06-06 ASSESSMENT — PATIENT HEALTH QUESTIONNAIRE - PHQ9
2. FEELING DOWN, DEPRESSED OR HOPELESS: 0
SUM OF ALL RESPONSES TO PHQ QUESTIONS 1-9: 0
SUM OF ALL RESPONSES TO PHQ QUESTIONS 1-9: 0
1. LITTLE INTEREST OR PLEASURE IN DOING THINGS: 0
SUM OF ALL RESPONSES TO PHQ QUESTIONS 1-9: 0
SUM OF ALL RESPONSES TO PHQ9 QUESTIONS 1 & 2: 0
SUM OF ALL RESPONSES TO PHQ QUESTIONS 1-9: 0

## 2023-06-06 NOTE — PROGRESS NOTES
1. \"Have you been to the ER, urgent care clinic since your last visit? Hospitalized since your last visit? \" Dina Bernal. Bowel blockage. 2. \"Have you seen or consulted any other health care providers outside of the [address] [place] [address] since your last visit? \"  [facility]    3. For patients aged 39-70: Has the patient had a colonoscopy / FIT/ Cologuard? No, wants a referral      If the patient is female:    4. For patients aged 41-77: Has the patient had a mammogram within the past 2 years? Yes - no Care Gap present      5. For patients aged 21-65: Has the patient had a pap smear? No, Don't need it anymore.
Right lower leg: No edema. Left lower leg: No edema. Skin:     General: Skin is warm and dry. Comments: Hyperpigmented dry macular patches of skin with some scaling noted, scattered on upper trunk. No tenderness or drainage. Neurological:      General: No focal deficit present. Mental Status: She is alert and oriented to person, place, and time. Psychiatric:         Mood and Affect: Mood normal.         Behavior: Behavior normal.         Thought Content: Thought content normal.        Assessment / Sunny Dejesus was seen today for new patient. Diagnoses and all orders for this visit:    Screen for colon cancer  -     [facility] - Ronny Lackey MD, [facility] ([address])    Primary insomnia : we have discussed ambein 10mg is higher than recommended dose for females. We have reviewed potential side effects and risks associated with chronic ambien use. She voices understanding of these concerns and wishes to continue [facility] use. -     zolpidem (AMBIEN) 10 MG tablet; Take 1 tablet by mouth nightly as needed for Sleep for up to 180 days. Max Daily Amount: 10 mg    History of TIA (transient ischemic attack) : cont asa, statin. Fibromyalgia    Tinea corporis    Other orders  -     montelukast (SINGULAIR) 10 MG tablet; Take 1 tablet by mouth daily  -     ketoconazole (NIZORAL) 2 % cream; Apply topically daily. Obtain medical records    I have discussed the diagnosis with the patient and the intended plan as seen in the above orders. I have discussed medication side effects and warnings with the patient as well.     Jean Estrella,

## 2023-06-08 ENCOUNTER — TELEPHONE (OUTPATIENT)
Dept: PRIMARY CARE CLINIC | Facility: CLINIC | Age: 58
End: 2023-06-08

## 2023-06-08 RX ORDER — CYCLOBENZAPRINE HCL 5 MG
5 TABLET ORAL NIGHTLY
Qty: 30 TABLET | Refills: 3 | Status: SHIPPED | OUTPATIENT
Start: 2023-06-08

## 2023-06-16 ENCOUNTER — TELEPHONE (OUTPATIENT)
Dept: PRIMARY CARE CLINIC | Facility: CLINIC | Age: 58
End: 2023-06-16

## 2023-06-20 ENCOUNTER — TELEPHONE (OUTPATIENT)
Dept: PRIMARY CARE CLINIC | Facility: CLINIC | Age: 58
End: 2023-06-20

## 2023-06-20 RX ORDER — KETOCONAZOLE 20 MG/G
CREAM TOPICAL
Qty: 30 G | Refills: 1 | Status: SHIPPED | OUTPATIENT
Start: 2023-06-20

## 2023-06-20 NOTE — TELEPHONE ENCOUNTER
Patient called needing the form for a shower chair faxed over to the surgeon's office in regards to having knee surgery to LM#[phone] to the Attn: Dena Baron who is the nurse. If you have any questions please reach out to the patient.

## 2023-06-20 NOTE — TELEPHONE ENCOUNTER
Patient needs a shower chair due to knee surgery last week.  The rash on her back that she had in past has come back and needs a cream.

## 2023-06-20 NOTE — TELEPHONE ENCOUNTER
LVM for patient that shower chair needs to come from surgeon. Advised the cream for rash was sent to pharmacy.

## 2023-07-27 ENCOUNTER — HOSPITAL ENCOUNTER (OUTPATIENT)
Facility: HOSPITAL | Age: 58
Setting detail: RECURRING SERIES
Discharge: HOME | End: 2023-07-30
Payer: MEDICARE

## 2023-07-27 PROCEDURE — 97161 PT EVAL LOW COMPLEX 20 MIN: CPT

## 2023-07-27 PROCEDURE — 97110 THERAPEUTIC EXERCISES: CPT

## 2023-07-27 NOTE — THERAPY EVALUATION
daily chores  3) Pt will be I in final HEP    Frequency / Duration: Patient to be seen 1-2 times per week for 8-10 weeks    Patient/ Caregiver education and instruction: Diagnosis, prognosis, self care, activity modification, and exercises [x]  Plan of care has been reviewed with PTA    Certification Period: 7/27/23- 10/20/23      Jamey Li, PT       7/27/2023       8:43 AM        ===================================================================  I certify that the above Therapy Services are being furnished while the patient is under my care. I agree with the treatment plan and certify that this therapy is necessary. Physician's Signature:_________________________   DATE:_________   TIME:________                           Violeta Johnson MD    ** Signature, Date and Time must be completed for valid certification **  Please sign and fax to [phone].   Thank you

## 2023-08-01 ENCOUNTER — HOSPITAL ENCOUNTER (OUTPATIENT)
Facility: HOSPITAL | Age: 58
Setting detail: RECURRING SERIES
Discharge: HOME | End: 2023-08-04
Payer: MEDICARE

## 2023-08-01 PROCEDURE — 97016 VASOPNEUMATIC DEVICE THERAPY: CPT

## 2023-08-01 PROCEDURE — 97110 THERAPEUTIC EXERCISES: CPT

## 2023-08-01 PROCEDURE — 97140 MANUAL THERAPY 1/> REGIONS: CPT

## 2023-08-01 NOTE — PROGRESS NOTES
/ OT services to modify and progress therapeutic interventions, analyze and address functional mobility deficits, analyze and address ROM deficits, analyze and address strength deficits, analyze and address soft tissue restrictions, analyze and cue for proper movement patterns, and instruct in home and community integration to address functional deficits and attain remaining goals.     Progress toward goals / Updated goals:  []  See Progress Note/Recertification    NT      PLAN  Yes  Continue plan of care  Re-Cert Due: 78/66/87  []  Upgrade activities as tolerated  []  Discharge due to :  []  Other:      Wilbert Fontenot, LINDY       8/1/2023       9:03 AM

## 2023-08-03 ENCOUNTER — APPOINTMENT (OUTPATIENT)
Facility: HOSPITAL | Age: 58
End: 2023-08-03
Payer: MEDICARE

## 2023-08-08 ENCOUNTER — HOSPITAL ENCOUNTER (OUTPATIENT)
Facility: HOSPITAL | Age: 58
Setting detail: RECURRING SERIES
Discharge: HOME | End: 2023-08-11
Payer: MEDICARE

## 2023-08-08 PROCEDURE — 97140 MANUAL THERAPY 1/> REGIONS: CPT

## 2023-08-08 PROCEDURE — 97016 VASOPNEUMATIC DEVICE THERAPY: CPT

## 2023-08-08 PROCEDURE — 97110 THERAPEUTIC EXERCISES: CPT

## 2023-08-08 NOTE — PROGRESS NOTES
PHYSICAL THERAPY - MEDICARE DAILY TREATMENT NOTE (updated 3/23)      Date: 2023          Patient Name:  Sussy Blanchard :  1965   Medical   Diagnosis:  Left knee pain [M25.562] Treatment Diagnosis:  M25.562  LEFT KNEE PAIN    Referral Source:  Rufus Cogan, MD Insurance:   Payor: Keely Larry / Plan: [address] [place] [address] / Product Type: *No Product type* /                     Patient  verified yes     Visit #   Current  / Total 2 N/a   Time   In / Out 8:00 AM 8:40 AM   Total Treatment Time 40   Total Timed Codes 30   1:1 Treatment Time 30      [facility] Totals Reminder:  bill using total billable   min of TIMED therapeutic procedures and modalities. 8-22 min = 1 unit; 23-37 min = 2 units; 38-52 min = 3 units; 53-67 min = 4 units; 68-82 min = 5 units            SUBJECTIVE    Pain Level (0-10 scale): 5-6/10    Any medication changes, allergies to medications, adverse drug reactions, diagnosis change, or new procedure performed?: [x] No    [] Yes (see summary sheet for update)  Medications: Verified on Patient Summary List    Subjective functional status/changes:     Patient reports her L knee is doing well, working on her exercises at home. States she feels her walking has improved however she is limited by her Asthma right now. She has been in a flare the past week, f/u with pulmonology who started her on steroids and she will be starting allergy shots soon. OBJECTIVE      Therapeutic Procedures: Tx Min Billable or 1:1 Min (if diff from Tx Min) Procedure, Rationale, Specifics   15  90719 Therapeutic Exercise (timed):  increase ROM, strength, coordination, balance, and proprioception to improve patient's ability to progress to PLOF and address remaining functional goals.  (see flow sheet as applicable)     Details if applicable:            Details if applicable:     15  07053 Manual Therapy (timed):  decrease pain, increase ROM, and increase tissue extensibility to improve patient's ability to

## 2023-08-10 ENCOUNTER — APPOINTMENT (OUTPATIENT)
Facility: HOSPITAL | Age: 58
End: 2023-08-10
Payer: MEDICARE

## 2023-08-15 ENCOUNTER — HOSPITAL ENCOUNTER (OUTPATIENT)
Facility: HOSPITAL | Age: 58
Setting detail: RECURRING SERIES
Discharge: HOME | End: 2023-08-18
Payer: MEDICARE

## 2023-08-15 PROCEDURE — 97140 MANUAL THERAPY 1/> REGIONS: CPT

## 2023-08-15 PROCEDURE — 97110 THERAPEUTIC EXERCISES: CPT

## 2023-08-15 NOTE — PROGRESS NOTES
PHYSICAL THERAPY - MEDICARE DAILY TREATMENT NOTE (updated 3/23)      Date: 8/15/2023          Patient Name:  Joyce Kan :  1965   Medical   Diagnosis:  Left knee pain [M25.562] Treatment Diagnosis:  M25.562  LEFT KNEE PAIN    Referral Source:  Burke Mejia MD Insurance:   Payor: Leeann Phillip / Plan: [address] [place] [address] / Product Type: *No Product type* /                     Patient  verified yes     Visit #   Current  / Total 4 N/a   Time   In / Out 7:55 AM 8:45 A   Total Treatment Time 50   Total Timed Codes 30   1:1 Treatment Time 30      [facility] Totals Reminder:  bill using total billable   min of TIMED therapeutic procedures and modalities. 8-22 min = 1 unit; 23-37 min = 2 units; 38-52 min = 3 units; 53-67 min = 4 units; 68-82 min = 5 units            SUBJECTIVE    Pain Level (0-10 scale): 4-5/10 \"I'm doing okay\"    Any medication changes, allergies to medications, adverse drug reactions, diagnosis change, or new procedure performed?: [x] No    [] Yes (see summary sheet for update)  Medications: Verified on Patient Summary List    Subjective functional status/changes:     Patient reports that she took an asthma pill before PT this AM. Her knee is doing good; she has been a little sore after PT visits. OBJECTIVE      Therapeutic Procedures: Tx Min Billable or 1:1 Min (if diff from Tx Min) Procedure, Rationale, Specifics   35 15 [id number] Therapeutic Exercise (timed):  increase ROM, strength, coordination, balance, and proprioception to improve patient's ability to progress to PLOF and address remaining functional goals. (see flow sheet as applicable)     Details if applicable:     15 15 42161 Manual Therapy (timed):  decrease pain, increase ROM, and increase tissue extensibility to improve patient's ability to progress to PLOF and address remaining functional goals.   The manual therapy interventions were performed at a separate and distinct time from the therapeutic activities interventions

## 2023-08-17 ENCOUNTER — APPOINTMENT (OUTPATIENT)
Facility: HOSPITAL | Age: 58
End: 2023-08-17
Payer: MEDICARE

## 2023-08-22 ENCOUNTER — HOSPITAL ENCOUNTER (OUTPATIENT)
Facility: HOSPITAL | Age: 58
Setting detail: RECURRING SERIES
Discharge: HOME | End: 2023-08-25
Payer: MEDICARE

## 2023-08-22 PROCEDURE — 97110 THERAPEUTIC EXERCISES: CPT

## 2023-08-22 PROCEDURE — 97016 VASOPNEUMATIC DEVICE THERAPY: CPT

## 2023-08-22 PROCEDURE — 97140 MANUAL THERAPY 1/> REGIONS: CPT

## 2023-08-22 NOTE — PROGRESS NOTES
home and community integration to address functional deficits and attain remaining goals.     Progress toward goals / Updated goals:  []  See Progress Note/Recertification    NT      PLAN  Yes  Continue plan of care  Re-Cert Due: 25/10/34  []  Upgrade activities as tolerated  []  Discharge due to :  []  Other:      Stewart Lowe, LINDY       8/22/2023       9:07 AM

## 2023-08-24 ENCOUNTER — APPOINTMENT (OUTPATIENT)
Facility: HOSPITAL | Age: 58
End: 2023-08-24
Payer: MEDICARE

## 2023-10-13 RX ORDER — CYCLOBENZAPRINE HCL 5 MG
5 TABLET ORAL
Qty: 30 TABLET | Refills: 3 | Status: SHIPPED | OUTPATIENT
Start: 2023-10-13

## 2023-10-13 NOTE — TELEPHONE ENCOUNTER
PCP: Michelle Arrieta DO    Last Visit 6/6/2023   No future appointments.     Requested Prescriptions     Pending Prescriptions Disp Refills    cyclobenzaprine (FLEXERIL) 5 MG tablet [Pharmacy Med Name: CYCLOBENZAPRINE 5 MG TABLET] 30 tablet 3     Sig: TAKE ONE TABLET BY MOUTH ONCE NIGHTLY         Other Comments: Last Refill   06/08/23

## 2023-11-24 RX ORDER — MONTELUKAST SODIUM 10 MG/1
10 TABLET ORAL DAILY
Qty: 90 TABLET | Refills: 1 | Status: SHIPPED | OUTPATIENT
Start: 2023-11-24

## 2023-12-11 DIAGNOSIS — F51.01 PRIMARY INSOMNIA: ICD-10-CM

## 2023-12-12 RX ORDER — ZOLPIDEM TARTRATE 10 MG/1
TABLET ORAL
Qty: 30 TABLET | OUTPATIENT
Start: 2023-12-12

## 2023-12-13 ENCOUNTER — TELEPHONE (OUTPATIENT)
Dept: PRIMARY CARE CLINIC | Facility: CLINIC | Age: 58
End: 2023-12-13

## 2023-12-13 DIAGNOSIS — F51.01 PRIMARY INSOMNIA: ICD-10-CM

## 2023-12-13 NOTE — TELEPHONE ENCOUNTER
Patient calling want to know why her Zolpidem was refused. I told her it maybe be because she was only since once and needs an appointment. Patient already had an appointment scheduled for Monday December 18th for follow. I told the patient I would seen a message back to Dr. [place] see is she can give her short supply of medication until her appointment because she is not sleeping.

## 2023-12-14 RX ORDER — ZOLPIDEM TARTRATE 10 MG/1
10 TABLET ORAL NIGHTLY PRN
Qty: 30 TABLET | Refills: 0 | Status: SHIPPED | OUTPATIENT
Start: 2023-12-14 | End: 2024-06-11

## 2023-12-18 ENCOUNTER — TELEPHONE (OUTPATIENT)
Dept: PRIMARY CARE CLINIC | Facility: CLINIC | Age: 58
End: 2023-12-18

## 2023-12-18 PROBLEM — F51.01 PRIMARY INSOMNIA: Status: ACTIVE | Noted: 2023-12-18

## 2023-12-18 PROBLEM — D64.9 NORMOCYTIC NORMOCHROMIC ANEMIA: Status: ACTIVE | Noted: 2023-12-18

## 2023-12-18 NOTE — TELEPHONE ENCOUNTER
Patient calling because she forgot during her appointment that she is having shoulder pain to her elbows and then from her elbows to her hands. She want to know what Dr. Kamara would like to do. I told the patient I would seen a message back and I did tell her to scheduled an appointment just incase Dr. Kamara would want her to come back in for that. Please call patient if needed.

## 2024-01-09 ENCOUNTER — HOSPITAL ENCOUNTER (OUTPATIENT)
Facility: HOSPITAL | Age: 59
Discharge: HOME | End: 2024-01-12
Attending: PAIN MEDICINE
Payer: MEDICARE

## 2024-01-09 ENCOUNTER — TRANSCRIBE ORDERS (OUTPATIENT)
Facility: HOSPITAL | Age: 59
End: 2024-01-09

## 2024-01-09 DIAGNOSIS — M47.812 CERVICAL SPONDYLOSIS WITHOUT MYELOPATHY: Primary | ICD-10-CM

## 2024-01-09 DIAGNOSIS — M47.812 CERVICAL SPONDYLOSIS WITHOUT MYELOPATHY: ICD-10-CM

## 2024-01-09 PROCEDURE — 72040 X-RAY EXAM NECK SPINE 2-3 VW: CPT

## 2024-02-08 ENCOUNTER — HOSPITAL ENCOUNTER (OUTPATIENT)
Facility: HOSPITAL | Age: 59
Discharge: HOME | End: 2024-02-08
Attending: PAIN MEDICINE
Payer: MEDICARE

## 2024-02-08 DIAGNOSIS — M54.12 RADICULOPATHY OF CERVICAL SPINE: ICD-10-CM

## 2024-02-08 PROCEDURE — 72141 MRI NECK SPINE W/O DYE: CPT

## 2024-02-16 RX ORDER — BUMETANIDE 2 MG/1
2 TABLET ORAL DAILY
Qty: 30 TABLET | Refills: 3 | Status: SHIPPED | OUTPATIENT
Start: 2024-02-16

## 2024-02-16 NOTE — TELEPHONE ENCOUNTER
PCP: Maria Ines Kamara DO    Last Visit 12/18/2023   No future appointments.    Requested Prescriptions     Pending Prescriptions Disp Refills    bumetanide (BUMEX) 2 MG tablet 30 tablet      Sig: Take 1 tablet by mouth daily         Other Comments: Last Refill   08/05/22

## 2024-02-16 NOTE — TELEPHONE ENCOUNTER
Patient said she needs a refill     Bumetanide 2 mg     Patient said she is totally out of medicine

## 2024-03-06 NOTE — TELEPHONE ENCOUNTER
PCP: Maria Ines Kamara DO    Last Visit 12/18/2023   No future appointments.    Requested Prescriptions     Pending Prescriptions Disp Refills    bumetanide (BUMEX) 2 MG tablet 30 tablet 3     Sig: Take 1 tablet by mouth daily    cyclobenzaprine (FLEXERIL) 5 MG tablet 30 tablet 3     Sig: Take 1 tablet by mouth 2 times daily as needed for Muscle spasms    montelukast (SINGULAIR) 10 MG tablet 90 tablet 1     Sig: Take 1 tablet by mouth daily    fluticasone (FLONASE) 50 MCG/ACT nasal spray 16 g          Other Comments: Last Refill

## 2024-03-06 NOTE — TELEPHONE ENCOUNTER
PCP: Maria Ines Kamara DO    Last Visit 12/18/2023   No future appointments.    Requested Prescriptions     Pending Prescriptions Disp Refills    bumetanide (BUMEX) 2 MG tablet 30 tablet 3     Sig: Take 1 tablet by mouth daily         Other Comments: Last Refill

## 2024-03-07 RX ORDER — CYCLOBENZAPRINE HCL 5 MG
5 TABLET ORAL 2 TIMES DAILY PRN
Qty: 30 TABLET | Refills: 3 | Status: SHIPPED | OUTPATIENT
Start: 2024-03-07

## 2024-03-07 RX ORDER — FLUTICASONE PROPIONATE 50 MCG
1 SPRAY, SUSPENSION (ML) NASAL DAILY
Qty: 16 G | Refills: 5 | Status: SHIPPED | OUTPATIENT
Start: 2024-03-07

## 2024-03-07 RX ORDER — BUMETANIDE 2 MG/1
2 TABLET ORAL DAILY
Qty: 90 TABLET | Refills: 1 | Status: SHIPPED | OUTPATIENT
Start: 2024-03-07

## 2024-03-07 RX ORDER — MONTELUKAST SODIUM 10 MG/1
10 TABLET ORAL DAILY
Qty: 90 TABLET | Refills: 1 | Status: SHIPPED | OUTPATIENT
Start: 2024-03-07

## 2024-03-08 RX ORDER — MONTELUKAST SODIUM 10 MG/1
10 TABLET ORAL DAILY
Qty: 90 TABLET | Refills: 1 | OUTPATIENT
Start: 2024-03-08

## 2024-03-08 RX ORDER — FLUTICASONE PROPIONATE 50 MCG
1 SPRAY, SUSPENSION (ML) NASAL DAILY
Qty: 16 G | Refills: 5 | OUTPATIENT
Start: 2024-03-08

## 2024-03-08 RX ORDER — GABAPENTIN 100 MG/1
100 CAPSULE ORAL 2 TIMES DAILY
Qty: 90 CAPSULE | OUTPATIENT
Start: 2024-03-08

## 2024-03-08 RX ORDER — CYCLOBENZAPRINE HCL 5 MG
5 TABLET ORAL 2 TIMES DAILY PRN
Qty: 30 TABLET | Refills: 3 | OUTPATIENT
Start: 2024-03-08

## 2024-03-08 RX ORDER — BUMETANIDE 2 MG/1
2 TABLET ORAL DAILY
Qty: 90 TABLET | Refills: 1 | OUTPATIENT
Start: 2024-03-08

## 2024-03-08 NOTE — TELEPHONE ENCOUNTER
PCP: Maria Ines Kamara DO    Last Visit 2023   No future appointments.    Requested Prescriptions     Pending Prescriptions Disp Refills    gabapentin (NEURONTIN) 100 MG capsule 90 capsule      Sig: Take 1 capsule by mouth 2 times daily. Max Daily Amount: 200 mg    bumetanide (BUMEX) 2 MG tablet 90 tablet 1     Sig: Take 1 tablet by mouth daily    cyclobenzaprine (FLEXERIL) 5 MG tablet 30 tablet 3     Sig: Take 1 tablet by mouth 2 times daily as needed for Muscle spasms    fluticasone (FLONASE) 50 MCG/ACT nasal spray 16 g 5     Si spray by Nasal route daily    montelukast (SINGULAIR) 10 MG tablet 90 tablet 1     Sig: Take 1 tablet by mouth daily         Other Comments: Last Refill

## 2024-03-08 NOTE — TELEPHONE ENCOUNTER
Patient calling because she can't have her medication filled at [facility] any more because of insurance. She needs she medication bumetanide, cyclobenzuprine, montelukast, fluticasone, and gabepentin sent to the [facility] - [facility] [address] [phone] - F [phone] [[id number]]

## 2024-05-06 ENCOUNTER — COMMUNITY OUTREACH (OUTPATIENT)
Dept: PRIMARY CARE CLINIC | Facility: CLINIC | Age: 59
End: 2024-05-06

## 2024-05-06 NOTE — PROGRESS NOTES
Patient's HM shows they are overdue for Colorectal Screening and Mammogram.   Care Everywhere and  files searched.  No results to attach to order nor HM updated.      Abstracted 3/19/18 Hep C & HIV results.

## 2024-05-10 DIAGNOSIS — F51.01 PRIMARY INSOMNIA: ICD-10-CM

## 2024-05-10 RX ORDER — ZOLPIDEM TARTRATE 10 MG/1
10 TABLET ORAL NIGHTLY PRN
Qty: 90 TABLET | Refills: 0 | Status: SHIPPED | OUTPATIENT
Start: 2024-05-10 | End: 2024-11-06

## 2024-05-10 NOTE — TELEPHONE ENCOUNTER
Patient has appt with Dr Kamara on June 24th and will run out of her ambien before that appointment.  She will need about 10 days of pills to get her to her appointment.

## 2024-06-12 ENCOUNTER — TELEPHONE (OUTPATIENT)
Dept: PRIMARY CARE CLINIC | Facility: CLINIC | Age: 59
End: 2024-06-12

## 2024-06-12 DIAGNOSIS — F51.01 PRIMARY INSOMNIA: ICD-10-CM

## 2024-06-12 RX ORDER — ZOLPIDEM TARTRATE 10 MG/1
10 TABLET ORAL NIGHTLY PRN
Qty: 30 TABLET | Refills: 0 | Status: SHIPPED | OUTPATIENT
Start: 2024-06-12 | End: 2024-12-09

## 2024-06-12 NOTE — TELEPHONE ENCOUNTER
Pt made aware that refill was sent to the mail delivery pharmacy on 05/10/2024, pt states that she did not receive the medication and would like the refill send to the [facility] pharmacy on [address] And would like to remove mail delivery from profile.

## 2024-06-24 ENCOUNTER — OFFICE VISIT (OUTPATIENT)
Dept: PRIMARY CARE CLINIC | Facility: CLINIC | Age: 59
End: 2024-06-24
Payer: MEDICARE

## 2024-06-24 VITALS
OXYGEN SATURATION: 100 % | WEIGHT: 246 LBS | DIASTOLIC BLOOD PRESSURE: 72 MMHG | BODY MASS INDEX: 40.98 KG/M2 | SYSTOLIC BLOOD PRESSURE: 100 MMHG | HEART RATE: 101 BPM | TEMPERATURE: 97.1 F | RESPIRATION RATE: 16 BRPM | HEIGHT: 65 IN

## 2024-06-24 DIAGNOSIS — Z86.73 HISTORY OF TIA (TRANSIENT ISCHEMIC ATTACK): ICD-10-CM

## 2024-06-24 DIAGNOSIS — Z13.1 SCREENING FOR DIABETES MELLITUS: ICD-10-CM

## 2024-06-24 DIAGNOSIS — E66.01 CLASS 3 SEVERE OBESITY DUE TO EXCESS CALORIES WITHOUT SERIOUS COMORBIDITY WITH BODY MASS INDEX (BMI) OF 40.0 TO 44.9 IN ADULT (HCC): ICD-10-CM

## 2024-06-24 DIAGNOSIS — D64.9 NORMOCYTIC NORMOCHROMIC ANEMIA: ICD-10-CM

## 2024-06-24 DIAGNOSIS — M79.7 FIBROMYALGIA: ICD-10-CM

## 2024-06-24 DIAGNOSIS — Z00.00 MEDICARE ANNUAL WELLNESS VISIT, SUBSEQUENT: Primary | ICD-10-CM

## 2024-06-24 DIAGNOSIS — Z12.31 SCREENING MAMMOGRAM FOR BREAST CANCER: ICD-10-CM

## 2024-06-24 DIAGNOSIS — Z12.11 ENCOUNTER FOR SCREENING COLONOSCOPY: ICD-10-CM

## 2024-06-24 DIAGNOSIS — F51.01 PRIMARY INSOMNIA: ICD-10-CM

## 2024-06-24 PROCEDURE — G0439 PPPS, SUBSEQ VISIT: HCPCS | Performed by: FAMILY MEDICINE

## 2024-06-24 PROCEDURE — 3017F COLORECTAL CA SCREEN DOC REV: CPT | Performed by: FAMILY MEDICINE

## 2024-06-24 RX ORDER — CYCLOBENZAPRINE HCL 5 MG
5 TABLET ORAL 2 TIMES DAILY PRN
Qty: 90 TABLET | Refills: 1 | Status: SHIPPED | OUTPATIENT
Start: 2024-06-24

## 2024-06-24 RX ORDER — ZOLPIDEM TARTRATE 10 MG/1
10 TABLET ORAL NIGHTLY PRN
Qty: 30 TABLET | Refills: 5 | Status: SHIPPED | OUTPATIENT
Start: 2024-06-24 | End: 2024-12-21

## 2024-06-24 RX ORDER — MONTELUKAST SODIUM 10 MG/1
10 TABLET ORAL DAILY
Qty: 90 TABLET | Refills: 1 | Status: SHIPPED | OUTPATIENT
Start: 2024-06-24

## 2024-06-24 RX ORDER — ATORVASTATIN CALCIUM 40 MG/1
40 TABLET, FILM COATED ORAL NIGHTLY
Qty: 90 TABLET | Refills: 1 | Status: SHIPPED | OUTPATIENT
Start: 2024-06-24

## 2024-06-24 RX ORDER — BUDESONIDE AND FORMOTEROL FUMARATE DIHYDRATE 160; 4.5 UG/1; UG/1
2 AEROSOL RESPIRATORY (INHALATION) 2 TIMES DAILY
Qty: 30.6 G | Refills: 5 | Status: SHIPPED | OUTPATIENT
Start: 2024-06-24

## 2024-06-24 RX ORDER — BUMETANIDE 2 MG/1
2 TABLET ORAL DAILY
Qty: 90 TABLET | Refills: 1 | Status: SHIPPED | OUTPATIENT
Start: 2024-06-24

## 2024-06-24 SDOH — ECONOMIC STABILITY: FOOD INSECURITY: WITHIN THE PAST 12 MONTHS, THE FOOD YOU BOUGHT JUST DIDN'T LAST AND YOU DIDN'T HAVE MONEY TO GET MORE.: NEVER TRUE

## 2024-06-24 SDOH — ECONOMIC STABILITY: FOOD INSECURITY: WITHIN THE PAST 12 MONTHS, YOU WORRIED THAT YOUR FOOD WOULD RUN OUT BEFORE YOU GOT MONEY TO BUY MORE.: NEVER TRUE

## 2024-06-24 SDOH — ECONOMIC STABILITY: INCOME INSECURITY: HOW HARD IS IT FOR YOU TO PAY FOR THE VERY BASICS LIKE FOOD, HOUSING, MEDICAL CARE, AND HEATING?: NOT VERY HARD

## 2024-06-24 SDOH — ECONOMIC STABILITY: HOUSING INSECURITY
IN THE LAST 12 MONTHS, WAS THERE A TIME WHEN YOU DID NOT HAVE A STEADY PLACE TO SLEEP OR SLEPT IN A SHELTER (INCLUDING NOW)?: NO

## 2024-06-24 ASSESSMENT — LIFESTYLE VARIABLES
HOW MANY STANDARD DRINKS CONTAINING ALCOHOL DO YOU HAVE ON A TYPICAL DAY: 1 OR 2
HOW OFTEN DO YOU HAVE A DRINK CONTAINING ALCOHOL: 2-4 TIMES A MONTH

## 2024-06-24 ASSESSMENT — PATIENT HEALTH QUESTIONNAIRE - PHQ9
SUM OF ALL RESPONSES TO PHQ QUESTIONS 1-9: 0
SUM OF ALL RESPONSES TO PHQ9 QUESTIONS 1 & 2: 0
SUM OF ALL RESPONSES TO PHQ QUESTIONS 1-9: 0
1. LITTLE INTEREST OR PLEASURE IN DOING THINGS: NOT AT ALL
2. FEELING DOWN, DEPRESSED OR HOPELESS: NOT AT ALL

## 2024-06-24 NOTE — PROGRESS NOTES
HPI     Chief Complaint   Patient presents with    Medicare AW     She is a 58 y.o. female who presents to establish care.    Establishing Care    Pmhx : asthma, hx TIA x 3, FM, depression and anxiety, insomnia.       Hx TIA x 3. On ASA and atorvastatin.      Insomnia, on ambien 10mg, stable. Tolerates this well. Has tried lower doses which are ineffective.      Asthma. Has recently been uncontrolled. Requiring inhaler daily.  She follows with pulm and has upcoming appnt. On antihistamine and singulair.     Chronic pain. Follows with pain management. Stable on current regimen.     Pap performed by her gyn, UTD.     Hx SBO 01/2023, requiring partial SBO.     Required recent surgery : component separation, small bowel resection with primary reanastomosis, appendectomy, MARNI, on 5/13/24.    She has recovered well.   Bm have been normal.   She is eating a regular diet.  She's planning to start aquatic exercises at the [facility].     No acute complaints.     Denies recent fall injuries.     - Chronic medical problems:  Past Medical History:   Diagnosis Date    Arthritis     Asthma     Depression     Heart palpitations     Heart palpitations     Hiatal hernia     Hiatal hernia     Insomnia     Joint pain     Morbid obesity with BMI of 40.0-44.9, adult (HCC)     Morbid obesity with BMI of 45.0-49.9, adult (HCC)      - Current medications:   Current Outpatient Medications   Medication Sig    bumetanide (BUMEX) 2 MG tablet Take 1 tablet by mouth daily    cyclobenzaprine (FLEXERIL) 5 MG tablet Take 1 tablet by mouth 2 times daily as needed for Muscle spasms    montelukast (SINGULAIR) 10 MG tablet Take 1 tablet by mouth daily    zolpidem (AMBIEN) 10 MG tablet Take 1 tablet by mouth nightly as needed for Sleep for up to 180 days. Max Daily Amount: 10 mg    atorvastatin (LIPITOR) 40 MG tablet Take 1 tablet by mouth nightly    budesonide-formoterol (SYMBICORT) 160-4.5 MCG/ACT AERO Inhale 2 puffs into the lungs 2 times daily

## 2024-06-24 NOTE — PROGRESS NOTES
\"Have you been to the ER, urgent care clinic since your last visit?  Hospitalized since your last visit?\"    YES - When: approximately 1 months ago.  Where and Why: emergency surgery at [facility].    “Have you seen or consulted any other health care providers outside of [facility] since your last visit?”    YES - When: approximately 1 months ago.  Where and Why: [facility].    Have you had a mammogram?”   YES - Where: 2021 Nurse/CMA to request most recent records if not in the chart    Date of last Mammogram: 9/16/2021         “Have you had a colorectal cancer screening such as a colonoscopy/FIT/Cologuard?    NO    No colonoscopy on file  No cologuard on file  No FIT/FOBT on file   No flexible sigmoidoscopy on file         Click Here for Release of Records Request

## 2024-11-01 NOTE — PROGRESS NOTES
[facility]   [address], [address]  [place] [address]  P: [phone]  F: [phone]    SUBJECTIVE     HPI:     Helen Velazco is a 59 y.o. female who is seen in the clinic for   Chief Complaint   Patient presents with    New Patient     Patient wants to get her weight and balance under control. Patient has fallen 3x.    Discuss Medications     AMBIEN        The patient presents to the office today to follow-up on chronic conditions. She is fasting today.    Hyperlipidemia, unspecified hyperlipidemia type  She stopped taking atorvastatin a few months ago as she does not eat a lot of cholesterol rich foods. She has a history of TIA, which she attributes to eating sodium heavy foods prior to the events.    Primary insomnia  Has been on zolpidem 10 mg since 2015, but feels it is no longer working for her. She has difficulty falling asleep and only sleeps for about 3-4 hours. Has not tried any other prescriptions for sleep.     Fibromyalgia  Followed by Dr. Mar. Currently on vicodin and celebrex. Notes her pain limits her physical activity.    Class 3 severe obesity due to excess calories without serious comorbidity with body mass index (BMI) of 40.0 to 44.9 in adult  She goes to the gym twice weekly. She does not eat a lot. She has been gaining weight despite her lifestyle odification. She is interested in weight loss medication, especially Wegovy. Has not seen a dietician.    Multiple falls  She has had 3 falls in the past year. She attributes this to her left knee giving out on her. She hears loud pops in her left knee, which are audible to other people. S/p knee replacement about 1 year ago. Would like to follow-up with ortho for this.     Health screenings:   Eye exam Done 1/2024  Dental exam Done 6/2024  Mammogram Done 2023, will contact with info  Colon cancer screening Scheduled 3/2025  PAP smear Not applicable - hysterectomy, cervix removed  COVID vaccine Done 9/2024    Tdap Done

## 2024-11-06 ENCOUNTER — TELEPHONE (OUTPATIENT)
Dept: PRIMARY CARE CLINIC | Facility: CLINIC | Age: 59
End: 2024-11-06

## 2024-11-06 ENCOUNTER — OFFICE VISIT (OUTPATIENT)
Dept: PRIMARY CARE CLINIC | Facility: CLINIC | Age: 59
End: 2024-11-06

## 2024-11-06 VITALS
DIASTOLIC BLOOD PRESSURE: 77 MMHG | SYSTOLIC BLOOD PRESSURE: 128 MMHG | HEIGHT: 65 IN | TEMPERATURE: 97.6 F | HEART RATE: 83 BPM | WEIGHT: 259.4 LBS | OXYGEN SATURATION: 100 % | BODY MASS INDEX: 43.22 KG/M2 | RESPIRATION RATE: 16 BRPM

## 2024-11-06 DIAGNOSIS — R73.09 ELEVATED GLUCOSE: ICD-10-CM

## 2024-11-06 DIAGNOSIS — E66.01 CLASS 3 SEVERE OBESITY DUE TO EXCESS CALORIES WITHOUT SERIOUS COMORBIDITY WITH BODY MASS INDEX (BMI) OF 40.0 TO 44.9 IN ADULT: ICD-10-CM

## 2024-11-06 DIAGNOSIS — Z86.73 HISTORY OF STROKE: ICD-10-CM

## 2024-11-06 DIAGNOSIS — Z96.653 HISTORY OF BILATERAL KNEE REPLACEMENT: ICD-10-CM

## 2024-11-06 DIAGNOSIS — E78.5 HYPERLIPIDEMIA, UNSPECIFIED HYPERLIPIDEMIA TYPE: ICD-10-CM

## 2024-11-06 DIAGNOSIS — R29.6 MULTIPLE FALLS: ICD-10-CM

## 2024-11-06 DIAGNOSIS — M79.7 FIBROMYALGIA: ICD-10-CM

## 2024-11-06 DIAGNOSIS — E66.813 CLASS 3 SEVERE OBESITY DUE TO EXCESS CALORIES WITHOUT SERIOUS COMORBIDITY WITH BODY MASS INDEX (BMI) OF 40.0 TO 44.9 IN ADULT: ICD-10-CM

## 2024-11-06 DIAGNOSIS — E78.5 HYPERLIPIDEMIA, UNSPECIFIED HYPERLIPIDEMIA TYPE: Primary | ICD-10-CM

## 2024-11-06 DIAGNOSIS — F51.01 PRIMARY INSOMNIA: ICD-10-CM

## 2024-11-06 DIAGNOSIS — G89.29 CHRONIC PAIN OF LEFT KNEE: ICD-10-CM

## 2024-11-06 DIAGNOSIS — M25.562 CHRONIC PAIN OF LEFT KNEE: ICD-10-CM

## 2024-11-06 LAB
ALBUMIN SERPL-MCNC: 4.5 G/DL (ref 3.5–5)
ALBUMIN/GLOB SERPL: 1.2 (ref 1.1–2.2)
ALP SERPL-CCNC: 89 U/L (ref 45–117)
ALT SERPL-CCNC: 20 U/L (ref 12–78)
ANION GAP SERPL CALC-SCNC: 6 MMOL/L (ref 2–12)
AST SERPL-CCNC: 21 U/L (ref 15–37)
BASOPHILS # BLD: 0.1 K/UL (ref 0–0.1)
BASOPHILS NFR BLD: 1 % (ref 0–1)
BILIRUB SERPL-MCNC: 1 MG/DL (ref 0.2–1)
BUN SERPL-MCNC: 15 MG/DL (ref 6–20)
BUN/CREAT SERPL: 20 (ref 12–20)
CALCIUM SERPL-MCNC: 9.7 MG/DL (ref 8.5–10.1)
CHLORIDE SERPL-SCNC: 108 MMOL/L (ref 97–108)
CHOLEST SERPL-MCNC: 221 MG/DL
CO2 SERPL-SCNC: 27 MMOL/L (ref 21–32)
CREAT SERPL-MCNC: 0.74 MG/DL (ref 0.55–1.02)
DIFFERENTIAL METHOD BLD: ABNORMAL
EOSINOPHIL # BLD: 0.2 K/UL (ref 0–0.4)
EOSINOPHIL NFR BLD: 3 % (ref 0–7)
ERYTHROCYTE [DISTWIDTH] IN BLOOD BY AUTOMATED COUNT: 12.8 % (ref 11.5–14.5)
EST. AVERAGE GLUCOSE BLD GHB EST-MCNC: 108 MG/DL
GLOBULIN SER CALC-MCNC: 3.7 G/DL (ref 2–4)
GLUCOSE SERPL-MCNC: 105 MG/DL (ref 65–100)
HBA1C MFR BLD: 5.4 % (ref 4–5.6)
HCT VFR BLD AUTO: 39.3 % (ref 35–47)
HDLC SERPL-MCNC: 100 MG/DL
HDLC SERPL: 2.2 (ref 0–5)
HGB BLD-MCNC: 12.1 G/DL (ref 11.5–16)
IMM GRANULOCYTES # BLD AUTO: 0 K/UL (ref 0–0.04)
IMM GRANULOCYTES NFR BLD AUTO: 0 % (ref 0–0.5)
LDLC SERPL CALC-MCNC: 106.4 MG/DL (ref 0–100)
LYMPHOCYTES # BLD: 2 K/UL (ref 0.8–3.5)
LYMPHOCYTES NFR BLD: 39 % (ref 12–49)
MCH RBC QN AUTO: 27.9 PG (ref 26–34)
MCHC RBC AUTO-ENTMCNC: 30.8 G/DL (ref 30–36.5)
MCV RBC AUTO: 90.6 FL (ref 80–99)
MONOCYTES # BLD: 0.5 K/UL (ref 0–1)
MONOCYTES NFR BLD: 11 % (ref 5–13)
NEUTS SEG # BLD: 2.3 K/UL (ref 1.8–8)
NEUTS SEG NFR BLD: 46 % (ref 32–75)
NRBC # BLD: 0 K/UL (ref 0–0.01)
NRBC BLD-RTO: 0 PER 100 WBC
PLATELET # BLD AUTO: 336 K/UL (ref 150–400)
PMV BLD AUTO: 8.8 FL (ref 8.9–12.9)
POTASSIUM SERPL-SCNC: 4.1 MMOL/L (ref 3.5–5.1)
PROT SERPL-MCNC: 8.2 G/DL (ref 6.4–8.2)
RBC # BLD AUTO: 4.34 M/UL (ref 3.8–5.2)
SODIUM SERPL-SCNC: 141 MMOL/L (ref 136–145)
TRIGL SERPL-MCNC: 73 MG/DL
VLDLC SERPL CALC-MCNC: 14.6 MG/DL
WBC # BLD AUTO: 5.1 K/UL (ref 3.6–11)

## 2024-11-06 RX ORDER — TRAZODONE HYDROCHLORIDE 50 MG/1
50 TABLET, FILM COATED ORAL NIGHTLY PRN
Qty: 90 TABLET | Refills: 1 | Status: SHIPPED | OUTPATIENT
Start: 2024-11-06

## 2024-11-06 SDOH — ECONOMIC STABILITY: INCOME INSECURITY: HOW HARD IS IT FOR YOU TO PAY FOR THE VERY BASICS LIKE FOOD, HOUSING, MEDICAL CARE, AND HEATING?: NOT HARD AT ALL

## 2024-11-06 SDOH — ECONOMIC STABILITY: FOOD INSECURITY: WITHIN THE PAST 12 MONTHS, YOU WORRIED THAT YOUR FOOD WOULD RUN OUT BEFORE YOU GOT MONEY TO BUY MORE.: NEVER TRUE

## 2024-11-06 SDOH — ECONOMIC STABILITY: FOOD INSECURITY: WITHIN THE PAST 12 MONTHS, THE FOOD YOU BOUGHT JUST DIDN'T LAST AND YOU DIDN'T HAVE MONEY TO GET MORE.: NEVER TRUE

## 2024-11-06 ASSESSMENT — ENCOUNTER SYMPTOMS
VOMITING: 0
ABDOMINAL PAIN: 0
WHEEZING: 0
BLOOD IN STOOL: 0
DIARRHEA: 0
NAUSEA: 0
CONSTIPATION: 0
SHORTNESS OF BREATH: 0
COUGH: 0

## 2024-11-06 ASSESSMENT — PATIENT HEALTH QUESTIONNAIRE - PHQ9
SUM OF ALL RESPONSES TO PHQ QUESTIONS 1-9: 2
SUM OF ALL RESPONSES TO PHQ9 QUESTIONS 1 & 2: 2
SUM OF ALL RESPONSES TO PHQ QUESTIONS 1-9: 2
SUM OF ALL RESPONSES TO PHQ QUESTIONS 1-9: 2
1. LITTLE INTEREST OR PLEASURE IN DOING THINGS: SEVERAL DAYS
SUM OF ALL RESPONSES TO PHQ QUESTIONS 1-9: 0
SUM OF ALL RESPONSES TO PHQ QUESTIONS 1-9: 2
SUM OF ALL RESPONSES TO PHQ QUESTIONS 1-9: 0
2. FEELING DOWN, DEPRESSED OR HOPELESS: SEVERAL DAYS
SUM OF ALL RESPONSES TO PHQ QUESTIONS 1-9: 0
2. FEELING DOWN, DEPRESSED OR HOPELESS: NOT AT ALL
1. LITTLE INTEREST OR PLEASURE IN DOING THINGS: NOT AT ALL
SUM OF ALL RESPONSES TO PHQ QUESTIONS 1-9: 0
SUM OF ALL RESPONSES TO PHQ9 QUESTIONS 1 & 2: 0

## 2024-11-06 NOTE — TELEPHONE ENCOUNTER
Pt is calling wanting to let Joselin know where and when she got her mammogram done. She got her mammogram done last year at   [facility]

## 2024-11-06 NOTE — TELEPHONE ENCOUNTER
Called patient to let her know the mammogram place she went to is now out of business and I would need a fax number or a phone number to contact them to get her records. Left [facility] due to patient not answering.

## 2024-11-06 NOTE — PROGRESS NOTES
Health Decision Maker has been checked with the patient   Primary Decision Maker: Hector Velazco - [phone]         Chief Complaint   Patient presents with    New Patient     Patient wants to get her weight and balance under control. Patient has fallen 3x.    Discuss Medications     AMBIEN       \"Have you been to the ER, urgent care clinic since your last visit?  Hospitalized since your last visit?\"    YES, WHEN THE PATIENT FELL AND WENT TO [facility]    “Have you seen or consulted any other health care providers outside of [facility] since your last visit?”    YES        Have you had a mammogram?”   YES, patient states she had one this year but doesn't remember where she went to.    Date of last Mammogram: 9/16/2021         “Have you had a colorectal cancer screening such as a colonoscopy/FIT/Cologuard?    Has never had one.            Click Here for Release of Records Request

## 2024-11-07 ENCOUNTER — TRANSCRIBE ORDERS (OUTPATIENT)
Facility: HOSPITAL | Age: 59
End: 2024-11-07

## 2024-11-07 ENCOUNTER — HOSPITAL ENCOUNTER (OUTPATIENT)
Facility: HOSPITAL | Age: 59
Discharge: HOME | End: 2024-11-10
Attending: PAIN MEDICINE
Payer: MEDICARE

## 2024-11-07 DIAGNOSIS — M17.10 LOCALIZED OSTEOARTHROSIS, LOWER LEG: ICD-10-CM

## 2024-11-07 DIAGNOSIS — M17.10 LOCALIZED OSTEOARTHROSIS, LOWER LEG: Primary | ICD-10-CM

## 2024-11-07 DIAGNOSIS — M17.0: ICD-10-CM

## 2024-11-07 DIAGNOSIS — M25.519: ICD-10-CM

## 2024-11-07 LAB — TSH SERPL DL<=0.05 MIU/L-ACNC: 1.35 UIU/ML (ref 0.45–4.5)

## 2024-11-07 PROCEDURE — 73030 X-RAY EXAM OF SHOULDER: CPT

## 2024-11-07 PROCEDURE — 73560 X-RAY EXAM OF KNEE 1 OR 2: CPT

## 2024-11-11 ENCOUNTER — TELEPHONE (OUTPATIENT)
Age: 59
End: 2024-11-11

## 2024-11-12 ENCOUNTER — TELEPHONE (OUTPATIENT)
Dept: PRIMARY CARE CLINIC | Facility: CLINIC | Age: 59
End: 2024-11-12

## 2024-11-12 NOTE — TELEPHONE ENCOUNTER
Pt called wanting to let Joselin know that she will be going back to her old sleep medication which is zolpidem (AMBIEN) 10 MG tablet  She's going back to her old sleep medication because the new one, traZODone (DESYREL) 50 MG tablet, is worsening her depression and she doesn't want to go through that since she feels her depression has been steady low and the new medication is triggering it. She just wanted to let Joselin know so when it's time to get a refill she'll want a refill on zolpidem (AMBIEN) 10 MG tablet

## 2024-12-19 ENCOUNTER — TELEPHONE (OUTPATIENT)
Age: 59
End: 2024-12-19

## 2024-12-19 NOTE — TELEPHONE ENCOUNTER
Called patient regarding referral to our [facility]. Patient did not answer so I left a vmail with our contact information.

## 2025-01-02 ENCOUNTER — TELEPHONE (OUTPATIENT)
Dept: PRIMARY CARE CLINIC | Facility: CLINIC | Age: 60
End: 2025-01-02

## 2025-01-02 DIAGNOSIS — F51.01 PRIMARY INSOMNIA: ICD-10-CM

## 2025-01-02 RX ORDER — ZOLPIDEM TARTRATE 10 MG/1
10 TABLET ORAL NIGHTLY PRN
Qty: 30 TABLET | Refills: 5 | Status: SHIPPED | OUTPATIENT
Start: 2025-01-02 | End: 2025-07-01

## 2025-01-02 NOTE — TELEPHONE ENCOUNTER
Patient tried trazodone as an alternative but did not tolerate well. She did advise that she would like to resume zolpidem.  PDMP reviewed. Last Zolpidem 10 mg fill was on 12/5/2024 for 30 tabs (30 days). Refill given today.

## 2025-01-29 RX ORDER — MONTELUKAST SODIUM 10 MG/1
10 TABLET ORAL DAILY
Qty: 90 TABLET | Refills: 3 | Status: SHIPPED | OUTPATIENT
Start: 2025-01-29

## 2025-01-29 NOTE — TELEPHONE ENCOUNTER
Pt is requesting refill on-  montelukast (SINGULAIR) 10 MG tablet    And would like for it to be sent to -  [facility] [address] [place] [address] - P [phone] - F [phone]    She said she had spoke with someone last week to request a refill but unfortunately I informed her that a message was never sent. She said she is out of her meds and needs it asap please.

## 2025-02-04 SDOH — ECONOMIC STABILITY: FOOD INSECURITY: WITHIN THE PAST 12 MONTHS, THE FOOD YOU BOUGHT JUST DIDN'T LAST AND YOU DIDN'T HAVE MONEY TO GET MORE.: OFTEN TRUE

## 2025-02-04 SDOH — ECONOMIC STABILITY: TRANSPORTATION INSECURITY
IN THE PAST 12 MONTHS, HAS THE LACK OF TRANSPORTATION KEPT YOU FROM MEDICAL APPOINTMENTS OR FROM GETTING MEDICATIONS?: NO

## 2025-02-04 SDOH — ECONOMIC STABILITY: FOOD INSECURITY: WITHIN THE PAST 12 MONTHS, YOU WORRIED THAT YOUR FOOD WOULD RUN OUT BEFORE YOU GOT MONEY TO BUY MORE.: SOMETIMES TRUE

## 2025-02-04 SDOH — ECONOMIC STABILITY: INCOME INSECURITY: IN THE LAST 12 MONTHS, WAS THERE A TIME WHEN YOU WERE NOT ABLE TO PAY THE MORTGAGE OR RENT ON TIME?: PATIENT DECLINED

## 2025-02-04 SDOH — ECONOMIC STABILITY: TRANSPORTATION INSECURITY
IN THE PAST 12 MONTHS, HAS LACK OF TRANSPORTATION KEPT YOU FROM MEETINGS, WORK, OR FROM GETTING THINGS NEEDED FOR DAILY LIVING?: PATIENT DECLINED

## 2025-02-05 ENCOUNTER — OFFICE VISIT (OUTPATIENT)
Dept: PRIMARY CARE CLINIC | Facility: CLINIC | Age: 60
End: 2025-02-05
Payer: MEDICARE

## 2025-02-05 VITALS
DIASTOLIC BLOOD PRESSURE: 74 MMHG | RESPIRATION RATE: 17 BRPM | TEMPERATURE: 97.3 F | HEART RATE: 84 BPM | OXYGEN SATURATION: 99 % | HEIGHT: 65 IN | WEIGHT: 266.8 LBS | BODY MASS INDEX: 44.45 KG/M2 | SYSTOLIC BLOOD PRESSURE: 136 MMHG

## 2025-02-05 DIAGNOSIS — F51.01 PRIMARY INSOMNIA: Primary | ICD-10-CM

## 2025-02-05 PROCEDURE — 99212 OFFICE O/P EST SF 10 MIN: CPT

## 2025-02-05 RX ORDER — FLUTICASONE PROPIONATE 50 MCG
1 SPRAY, SUSPENSION (ML) NASAL DAILY
Qty: 16 G | Refills: 5 | Status: SHIPPED | OUTPATIENT
Start: 2025-02-05

## 2025-02-05 RX ORDER — ATORVASTATIN CALCIUM 40 MG/1
40 TABLET, FILM COATED ORAL NIGHTLY
Qty: 90 TABLET | Refills: 1 | Status: SHIPPED | OUTPATIENT
Start: 2025-02-05

## 2025-02-05 RX ORDER — CELECOXIB 200 MG/1
200 CAPSULE ORAL 2 TIMES DAILY
COMMUNITY

## 2025-02-05 SDOH — ECONOMIC STABILITY: FOOD INSECURITY: WITHIN THE PAST 12 MONTHS, YOU WORRIED THAT YOUR FOOD WOULD RUN OUT BEFORE YOU GOT MONEY TO BUY MORE.: NEVER TRUE

## 2025-02-05 SDOH — ECONOMIC STABILITY: FOOD INSECURITY: WITHIN THE PAST 12 MONTHS, THE FOOD YOU BOUGHT JUST DIDN'T LAST AND YOU DIDN'T HAVE MONEY TO GET MORE.: NEVER TRUE

## 2025-02-05 ASSESSMENT — PATIENT HEALTH QUESTIONNAIRE - PHQ9
SUM OF ALL RESPONSES TO PHQ QUESTIONS 1-9: 2
SUM OF ALL RESPONSES TO PHQ9 QUESTIONS 1 & 2: 2
SUM OF ALL RESPONSES TO PHQ QUESTIONS 1-9: 2
1. LITTLE INTEREST OR PLEASURE IN DOING THINGS: SEVERAL DAYS
SUM OF ALL RESPONSES TO PHQ QUESTIONS 1-9: 2
SUM OF ALL RESPONSES TO PHQ QUESTIONS 1-9: 2
2. FEELING DOWN, DEPRESSED OR HOPELESS: SEVERAL DAYS

## 2025-02-05 ASSESSMENT — ENCOUNTER SYMPTOMS
VOMITING: 0
NAUSEA: 0
BLOOD IN STOOL: 0
ABDOMINAL PAIN: 1
SHORTNESS OF BREATH: 0
COUGH: 0
DIARRHEA: 0
CONSTIPATION: 0
WHEEZING: 0

## 2025-02-05 NOTE — PROGRESS NOTES
[facility]   [address], [address]  [place] [address]  P: [phone]  F: [phone]    SUBJECTIVE     HPI:     Helen Velazco is a 59 y.o. female who is seen in the clinic for   Chief Complaint   Patient presents with    Discuss Medications     Ambien and feels like it's not working and wants a stronger dosage and is dealing with dry eyes.        The patient presents to the office today to follow-up on insomnia    Primary insomnia  Previously tried trazodone 50 mg, but this did not help with insomnia. Currently on Ambien 10 mg, which she reports is not helping her to fall asleep fast enough. Ambien used to help her fall asleep immediately. Does feel like she is sometimes sleep walking, but she is awake. Notes that she waits an hour or so and makes food.      Patient Active Problem List   Diagnosis    Fibromyalgia    Severe obesity    Median nerve neuropathy    TIA (transient ischemic attack)    Normocytic normochromic anemia    Primary insomnia    History of stroke        Past Medical History:   Diagnosis Date    Anxiety 2022    During Pandemic time    Arthritis     Asthma     Chronic back pain 1999    Depression     Fibromyalgia 2017    GERD (gastroesophageal reflux disease) Now    Heart palpitations     Heart palpitations     Hiatal hernia     Hiatal hernia     Insomnia     Joint pain     Morbid obesity with BMI of 40.0-44.9, adult     Morbid obesity with BMI of 45.0-49.9, adult     Neuropathy 2021    Osteoarthritis 2000     Current Outpatient Medications   Medication Sig Dispense Refill    celecoxib (CELEBREX) 200 MG capsule Take 1 capsule by mouth 2 times daily      fluticasone (FLONASE) 50 MCG/ACT nasal spray 1 spray by Nasal route daily 16 g 5    atorvastatin (LIPITOR) 40 MG tablet Take 1 tablet by mouth nightly 90 tablet 1    montelukast (SINGULAIR) 10 MG tablet Take 1 tablet by mouth daily 90 tablet 3    zolpidem (AMBIEN) 10 MG tablet Take 1 tablet by mouth nightly as needed for

## 2025-02-05 NOTE — PROGRESS NOTES
Health Decision Maker has been checked with the patient   Primary Decision Maker: Hector Velazco - Caitlyn - [phone]         Chief Complaint   Patient presents with    Discuss Medications     Ambien and feels like it's not working and wants a stronger dosage and is dealing with dry eyes.       \"Have you been to the ER, urgent care clinic since your last visit?  Hospitalized since your last visit?\"    no    “Have you seen or consulted any other health care providers outside of [facility] since your last visit?”    no     Have you had a mammogram?”   yes    Date of last Mammogram: 9/16/2021         “Have you had a colorectal cancer screening such as a colonoscopy/FIT/Cologuard?    Scheduled for next month      Click Here for Release of Records Request

## 2025-02-14 ENCOUNTER — TELEPHONE (OUTPATIENT)
Dept: PRIMARY CARE CLINIC | Facility: CLINIC | Age: 60
End: 2025-02-14

## 2025-02-14 NOTE — TELEPHONE ENCOUNTER
Patient states yesterday she got really dizzy and almost passed out after she blew her nose. It was like she was experiencing vertigo, per PT. I let the patient know she should go to the ER and then follow up with us.

## 2025-02-14 NOTE — TELEPHONE ENCOUNTER
Patient stopped in and asked to speak to Glynn.  She left, asked to receive a phone call from her.

## 2025-02-16 ASSESSMENT — SLEEP AND FATIGUE QUESTIONNAIRES
HOW LIKELY ARE YOU TO NOD OFF OR FALL ASLEEP WHILE SITTING INACTIVE IN A PUBLIC PLACE: WOULD NEVER DOZE
HOW LIKELY ARE YOU TO NOD OFF OR FALL ASLEEP WHILE SITTING AND TALKING TO SOMEONE: WOULD NEVER DOZE
DO YOU HAVE DIFFICULTY VISITING YOUR FAMILY OR FRIENDS IN THEIR HOME BECAUSE YOU BECOME SLEEPY OR TIRED: NO
DO YOU HAVE DIFFICULTY OPERATING A MOTOR VEHICLE FOR SHORT DISTANCES (LESS THAN 100 MILES) BECAUSE YOU BECOME SLEEPY: NO
NUMBER OF TIMES YOU WAKE PER NIGHT: 4
HOW LIKELY ARE YOU TO NOD OFF OR FALL ASLEEP WHILE LYING DOWN TO REST IN THE AFTERNOON WHEN CIRCUMSTANCES PERMIT: WOULD NEVER DOZE
HOW LIKELY ARE YOU TO NOD OFF OR FALL ASLEEP IN A CAR, WHILE STOPPED FOR A FEW MINUTES IN TRAFFIC: WOULD NEVER DOZE
HOW LIKELY ARE YOU TO NOD OFF OR FALL ASLEEP IN A CAR, WHILE STOPPED FOR A FEW MINUTES IN TRAFFIC: WOULD NEVER DOZE
DO YOU WORK SHIFTS: NO
DO YOU HAVE PROBLEMS WITH FREQUENT AWAKENINGS AT NIGHT: YES
DO YOU GET TOO LITTLE SLEEP AT NIGHT: YES
DO YOU HAVE DIFFICULTY CONCENTRATING ON THE THINGS YOU DO BECAUSE YOU ARE SLEEPY OR TIRED: NO
ESS TOTAL SCORE: 0
DO YOU HAVE DIFFICULTY OPERATING A MOTOR VEHICLE FOR LONG DISTANCES (GREATER THAN 100 MILES) BECAUSE YOU BECOME SLEEPY: NO
HOW LIKELY ARE YOU TO NOD OFF OR FALL ASLEEP WHEN YOU ARE A PASSENGER IN A CAR FOR AN HOUR WITHOUT A BREAK: WOULD NEVER DOZE
HOW LIKELY ARE YOU TO NOD OFF OR FALL ASLEEP WHILE SITTING AND READING: WOULD NEVER DOZE
HAS YOUR MOOD BEEN AFFECTED BECAUSE YOU ARE SLEEPY OR TIRED: YES, LITTLE
AVERAGE NUMBER OF SLEEP HOURS: 5
HOW LIKELY ARE YOU TO NOD OFF OR FALL ASLEEP WHILE SITTING AND TALKING TO SOMEONE: WOULD NEVER DOZE
AVERAGE NUMBER OF SLEEP HOURS: 5
DO YOU HAVE DIFFICULTY WATCHING A MOVIE OR VIDEO BECAUSE YOU BECOME SLEEPY OR TIRED: NO
HOW LIKELY ARE YOU TO NOD OFF OR FALL ASLEEP WHILE SITTING QUIETLY AFTER LUNCH WITHOUT ALCOHOL: WOULD NEVER DOZE
HOW LIKELY ARE YOU TO NOD OFF OR FALL ASLEEP WHILE SITTING INACTIVE IN A PUBLIC PLACE: WOULD NEVER DOZE
ARE YOU BOTHERED BY WAKING UP TOO EARLY AND NOT BEING ABLE TO GET BACK TO SLEEP: YES
DO YOU TAKE NAPS: NO
DO YOU GENERALLY HAVE DIFFICULTY REMEMBERING THINGS BECAUSE YOU ARE SLEEPY OR TIRED: NO
SELECT ANY OF THE FOLLOWING BEHAVIORS OBSERVED WHILE YOU ARE ASLEEP: SLEEPWALKING
HOW LIKELY ARE YOU TO NOD OFF OR FALL ASLEEP WHILE WATCHING TV: WOULD NEVER DOZE
ARE YOU BOTHERED BY WAKING UP TOO EARLY AND NOT BEING ABLE TO GET BACK TO SLEEP: YES
SELECT ANY OF THE FOLLOWING BEHAVIORS OBSERVED WHILE PATIENT ASLEEP: SLEEPWALKING
HOW LIKELY ARE YOU TO NOD OFF OR FALL ASLEEP WHILE LYING DOWN TO REST IN THE AFTERNOON WHEN CIRCUMSTANCES PERMIT: WOULD NEVER DOZE
DO YOU HAVE DIFFICULTY BEING AS ACTIVE AS YOU WANT TO BE IN THE MORNING BECAUSE YOU ARE SLEEPY OR TIRED: YES, LITTLE
FOSQ SCORE: 18.5
HOW LIKELY ARE YOU TO NOD OFF OR FALL ASLEEP WHEN YOU ARE A PASSENGER IN A CAR FOR AN HOUR WITHOUT A BREAK: WOULD NEVER DOZE
HOW LIKELY ARE YOU TO NOD OFF OR FALL ASLEEP WHILE SITTING AND READING: WOULD NEVER DOZE
HAS YOUR RELATIONSHIP WITH FAMILY, FRIENDS OR WORK COLLEAGUES BEEN AFFECTED BECAUSE YOU ARE SLEEPY OR TIRED: NO
DO YOU HAVE DIFFICULTY BEING AS ACTIVE AS YOU WANT TO BE IN THE EVENING BECAUSE YOU ARE SLEEPY OR TIRED: YES, LITTLE
HOW LIKELY ARE YOU TO NOD OFF OR FALL ASLEEP WHILE WATCHING TV: WOULD NEVER DOZE
DO YOU GET TOO LITTLE SLEEP AT NIGHT: YES
WHAT TIME DO YOU USUALLY GO TO BED: 21:00
HOW LIKELY ARE YOU TO NOD OFF OR FALL ASLEEP WHILE SITTING QUIETLY AFTER LUNCH WITHOUT ALCOHOL: WOULD NEVER DOZE

## 2025-02-18 ENCOUNTER — TELEPHONE (OUTPATIENT)
Age: 60
End: 2025-02-18

## 2025-02-18 RX ORDER — LORAZEPAM 0.5 MG/1
0.5 TABLET ORAL EVERY 6 HOURS PRN
COMMUNITY

## 2025-02-18 RX ORDER — OXYCODONE HYDROCHLORIDE 5 MG/1
5 TABLET ORAL 2 TIMES DAILY
COMMUNITY
Start: 2024-05-15

## 2025-02-18 RX ORDER — MULTIVITAMIN WITH IRON
100 TABLET ORAL DAILY
COMMUNITY

## 2025-02-18 RX ORDER — DEXAMETHASONE SODIUM PHOSPHATE 4 MG/ML
INJECTION, SOLUTION INTRA-ARTICULAR; INTRALESIONAL; INTRAMUSCULAR; INTRAVENOUS; SOFT TISSUE
COMMUNITY
Start: 2024-05-13 | End: 2025-02-19

## 2025-02-18 RX ORDER — FEXOFENADINE HCL AND PSEUDOEPHEDRINE HCI 60; 120 MG/1; MG/1
TABLET, EXTENDED RELEASE ORAL
COMMUNITY
End: 2025-02-19

## 2025-02-18 RX ORDER — DICLOFENAC SODIUM 100 MG/1
TABLET, EXTENDED RELEASE ORAL
COMMUNITY
End: 2025-02-19

## 2025-02-18 RX ORDER — AMOXICILLIN 500 MG
CAPSULE ORAL
COMMUNITY
End: 2025-02-19

## 2025-02-18 RX ORDER — VIT C/B6/B5/MAGNESIUM/HERB 173 50-5-6-5MG
500 CAPSULE ORAL
COMMUNITY
Start: 2024-05-09 | End: 2025-02-19

## 2025-02-18 RX ORDER — METRONIDAZOLE 500 MG/1
TABLET ORAL
COMMUNITY
End: 2025-02-19

## 2025-02-18 RX ORDER — PANTOPRAZOLE SODIUM 40 MG/1
TABLET, DELAYED RELEASE ORAL
COMMUNITY
End: 2025-02-19

## 2025-02-18 RX ORDER — CHOLECALCIFEROL (VITAMIN D3) 50 MCG
TABLET ORAL
COMMUNITY
End: 2025-02-19

## 2025-02-18 RX ORDER — IBUPROFEN 200 MG
TABLET ORAL
COMMUNITY
End: 2025-02-19

## 2025-02-18 RX ORDER — CIPROFLOXACIN 500 MG/1
TABLET, FILM COATED ORAL
COMMUNITY
End: 2025-02-19

## 2025-02-18 RX ORDER — TOPIRAMATE 50 MG/1
TABLET, FILM COATED ORAL
COMMUNITY
End: 2025-02-19

## 2025-02-18 RX ORDER — B-COMPLEX WITH VITAMIN C
1 TABLET ORAL DAILY
COMMUNITY

## 2025-02-18 RX ORDER — HYOSCYAMINE SULFATE 0.12 MG/1
TABLET SUBLINGUAL
COMMUNITY
End: 2025-02-19

## 2025-02-18 RX ORDER — DICLOFENAC SODIUM 75 MG/1
TABLET, DELAYED RELEASE ORAL
COMMUNITY
End: 2025-02-19

## 2025-02-18 RX ORDER — DULOXETIN HYDROCHLORIDE 60 MG/1
CAPSULE, DELAYED RELEASE ORAL
COMMUNITY
End: 2025-02-19

## 2025-02-18 RX ORDER — ONDANSETRON 4 MG/1
4 TABLET, FILM COATED ORAL
COMMUNITY
Start: 2024-05-15 | End: 2025-02-19

## 2025-02-18 RX ORDER — MULTIVITAMIN WITH IRON
TABLET ORAL
COMMUNITY
End: 2025-02-19

## 2025-02-18 RX ORDER — B-COMPLEX WITH VITAMIN C
TABLET ORAL
COMMUNITY
End: 2025-02-19

## 2025-02-18 RX ORDER — ONDANSETRON 4 MG/1
TABLET, ORALLY DISINTEGRATING ORAL
COMMUNITY
End: 2025-02-19

## 2025-02-18 NOTE — TELEPHONE ENCOUNTER
Called patient unable to leave a voice message due to voice mailbox is full.  To inform patient the appointment on 2/19/25 at 8:50 a.m  has been changed to a virtual appointment due to the upcoming weather forecast for snow.

## 2025-02-19 ENCOUNTER — TELEMEDICINE (OUTPATIENT)
Age: 60
End: 2025-02-19
Payer: MEDICARE

## 2025-02-19 DIAGNOSIS — G47.00 INSOMNIA, UNSPECIFIED TYPE: Primary | ICD-10-CM

## 2025-02-19 DIAGNOSIS — G47.33 OBSTRUCTIVE SLEEP APNEA (ADULT) (PEDIATRIC): ICD-10-CM

## 2025-02-19 PROCEDURE — 99204 OFFICE O/P NEW MOD 45 MIN: CPT | Performed by: INTERNAL MEDICINE

## 2025-02-19 NOTE — PROGRESS NOTES
Helen Velazco, was evaluated through a synchronous (real-time) audio-video encounter. The patient (or guardian if applicable) is aware that this is a billable service, which includes applicable co-pays. This Virtual Visit was conducted with patient's (and/or legal guardian's) consent. Patient identification was verified, and a caregiver was present when appropriate.   The patient was located at Home: [address]  [place] [address]  Provider was located at Home (Appt Dept State): [place]  Confirm you are appropriately licensed, registered, or certified to deliver care in the state where the patient is located as indicated above. If you are not or unsure, please re-schedule the visit: Yes, I confirm.      Total time spent for this encounter: Not billed by time    --Magalie Smith MD on 2/19/2025 at 3:58 PM    An electronic signature was used to authenticate this note.         Patient called and identity confirmed with 2 patient identifers     Helen Velazco is a 59 y.o. female who was seen by synchronous (real-time) audio-video technology on 2/19/2025.           --Magalie Smith MD on 2/19/2025 at 3:58 PM                                 [address]  [place] [address]  Tel.  [phone]  Fax. [phone] [address]  [place] [address]  Tel.  [phone]  Fax. [phone] [address] [place] [address]  Tel.  [phone]  Fax. [phone]         Subjective:             Helen Velazco is an 59 y.o. female  referred for evaluation for a sleep disorder.       She complains of difficulty falling asleep and staying asleep associated with she has been on Ambien for years and feels it is not working as well anymore. She is tired.  She used to sleep all night. She will find food in her room that she does not remember preparing.  Symptoms began 10 years ago, gradually worsening since that time. She usually can fall asleep in 60 minutes.  Family or house

## 2025-03-04 ENCOUNTER — TELEPHONE (OUTPATIENT)
Age: 60
End: 2025-03-04

## 2025-05-30 ENCOUNTER — TELEPHONE (OUTPATIENT)
Dept: PHARMACY | Facility: CLINIC | Age: 60
End: 2025-05-30

## 2025-05-30 NOTE — TELEPHONE ENCOUNTER
[facility]ARM[facility]: ADHERENCE REVIEW  Identified care gap per [employer] [facility] with [facility]: Statin adherence    Last Visit:  2025  Next Visit: NA    Patient also appears to be prescribed:No Others in the metric at this time  Medicare Advantage - MRMA ACO    ASSESSMENT  STATIN ADHERENCE    Insurance Records claims through 25 (Prior Year PDC = 75% - FAILED; YTD PDC = FIRST FILL; Potential Fail Date: 2025):     ATORVASTATIN TAB 40MG last filled on 2025 for 90 / 90 day supply. Next refill due: 2025  Sig:TAKE 1 TABLET BY MOUTH NIGHTLY     Prescriber: TIMI KIRK   [x] [facility] [] Outside Provider     Per Medication List/ Patient profile/ Care everywhere (Epic):  1 refills remaining.    [] Crital fill [] CURRENT refill [] FUTURE refill [] RX CHANGE    Lab Results   Component Value Date/Time    CHOL 221 2024 08:50 AM    TRIG 73 2024 08:50 AM     2024 08:50 AM    .4 2024 08:50 AM    LDL 51.2 2022 10:36 AM    VLDL 14.6 2024 08:50 AM    ALT 20 2024 08:50 AM    AST 21 2024 08:50 AM     The 10-year ASCVD risk score (Aparna PHIPPS, et al., 2019) is: 3.2%     PLAN  The following are interventions that have been identified:   Patient OVERDUE refilling ATORVASTATIN TAB 40 MG and active on home medication list.   Outreach:  Pharmacy:  [facility] will fill 90 day supply of ATORVASTATIN TAB 40MG today 25 .  Patient:  [facility] patient.  Attempting to reach patient to review.  Left message asking for return call.  ATORVASTATIN TAB 40MG  Ready at [facility]  Eligible for 100 day supply      Wendy PeaceD, BCACP  Population Health Pharmacist  [facility]   Department, toll free: [phone] Option 1    For Pharmacy Admin Tracking Only    Program: [facility] [employer]  CPA in place:  No  Recommendation Provided To: Pharmacy: 1  Intervention Detail: Adherence Monitorin  Intervention Accepted

## 2025-06-28 DIAGNOSIS — F51.01 PRIMARY INSOMNIA: ICD-10-CM

## 2025-06-30 DIAGNOSIS — F51.01 PRIMARY INSOMNIA: ICD-10-CM

## 2025-06-30 RX ORDER — ZOLPIDEM TARTRATE 10 MG/1
10 TABLET ORAL NIGHTLY PRN
Qty: 30 TABLET | Refills: 0 | Status: SHIPPED | OUTPATIENT
Start: 2025-06-30 | End: 2025-07-03 | Stop reason: SDUPTHER

## 2025-06-30 NOTE — ASSESSMENT & PLAN NOTE
Improved with techniques provided by sleep medicine. Not interested in decreasing Ambien. PDMP reviewed and appropriate. Last Zolpidem fill was 5/27/25 for 30 tabs (30 days). Refilled today.    Orders:    zolpidem (AMBIEN) 10 MG tablet; Take 1 tablet by mouth nightly as needed for Sleep for up to 180 days. Max Daily Amount: 10 mg

## 2025-06-30 NOTE — PROGRESS NOTES
Helen Velazco, was evaluated through a synchronous (real-time) audio-video encounter. The patient (or guardian if applicable) is aware that this is a billable service, which includes applicable co-pays. This Virtual Visit was conducted with patient's (and/or legal guardian's) consent. Patient identification was verified, and a caregiver was present when appropriate.   The patient was located at Home: [address]  [place] [address]  Provider was located at Facility (Appt Dept): [id number] [place] [address] [address]  [place] [address]  Confirm you are appropriately licensed, registered, or certified to deliver care in the state where the patient is located as indicated above. If you are not or unsure, please re-schedule the visit: Yes, I confirm.     Helen Velazco (:  1965) is a Established patient, presenting virtually for evaluation of the following:      Below is the assessment and plan developed based on review of pertinent history, physical exam, labs, studies, and medications.     Assessment & Plan  Hyperlipidemia, unspecified hyperlipidemia type   Continue atorvastatin. Will check fasting labs.    Orders:    Lipid Panel; Future    Comprehensive Metabolic Panel; Future    atorvastatin (LIPITOR) 40 MG tablet; Take 1 tablet by mouth nightly    Primary insomnia   Improved with techniques provided by sleep medicine. Not interested in decreasing Ambien. PDMP reviewed and appropriate. Last Zolpidem fill was 25 for 30 tabs (30 days). Refilled today.    Orders:    zolpidem (AMBIEN) 10 MG tablet; Take 1 tablet by mouth nightly as needed for Sleep for up to 180 days. Max Daily Amount: 10 mg    Encounter for screening mammogram for malignant neoplasm of breast   Mammogram ordered.    Orders:    [facility] ALEXX DIGITAL SCREEN BILATERAL; Future      Return in about 5 months (around 12/3/2025), or if symptoms worsen or fail to improve, for medicare wellness, follow-up with labs.       Subjective

## 2025-07-02 RX ORDER — ZOLPIDEM TARTRATE 10 MG/1
TABLET ORAL
Qty: 30 TABLET | Refills: 0 | OUTPATIENT
Start: 2025-07-02

## 2025-07-03 ENCOUNTER — TELEMEDICINE (OUTPATIENT)
Dept: PRIMARY CARE CLINIC | Facility: CLINIC | Age: 60
End: 2025-07-03
Payer: MEDICARE

## 2025-07-03 DIAGNOSIS — F51.01 PRIMARY INSOMNIA: ICD-10-CM

## 2025-07-03 DIAGNOSIS — E78.5 HYPERLIPIDEMIA, UNSPECIFIED HYPERLIPIDEMIA TYPE: Primary | ICD-10-CM

## 2025-07-03 DIAGNOSIS — Z12.31 ENCOUNTER FOR SCREENING MAMMOGRAM FOR MALIGNANT NEOPLASM OF BREAST: ICD-10-CM

## 2025-07-03 PROCEDURE — 99213 OFFICE O/P EST LOW 20 MIN: CPT

## 2025-07-03 RX ORDER — ZOLPIDEM TARTRATE 10 MG/1
10 TABLET ORAL NIGHTLY PRN
Qty: 30 TABLET | Refills: 4 | Status: SHIPPED | OUTPATIENT
Start: 2025-07-03 | End: 2025-12-30

## 2025-07-03 RX ORDER — MONTELUKAST SODIUM 10 MG/1
10 TABLET ORAL DAILY
Qty: 90 TABLET | Refills: 3 | Status: SHIPPED | OUTPATIENT
Start: 2025-07-03

## 2025-07-03 RX ORDER — FLUTICASONE PROPIONATE 50 MCG
1 SPRAY, SUSPENSION (ML) NASAL DAILY
Qty: 16 G | Refills: 5 | Status: SHIPPED | OUTPATIENT
Start: 2025-07-03

## 2025-07-03 RX ORDER — FEXOFENADINE HCL AND PSEUDOEPHEDRINE HCL 180; 240 MG/1; MG/1
1 TABLET, EXTENDED RELEASE ORAL DAILY
COMMUNITY

## 2025-07-03 RX ORDER — ATORVASTATIN CALCIUM 40 MG/1
40 TABLET, FILM COATED ORAL NIGHTLY
Qty: 90 TABLET | Refills: 1 | Status: SHIPPED | OUTPATIENT
Start: 2025-07-03

## 2025-07-03 SDOH — ECONOMIC STABILITY: FOOD INSECURITY: WITHIN THE PAST 12 MONTHS, YOU WORRIED THAT YOUR FOOD WOULD RUN OUT BEFORE YOU GOT MONEY TO BUY MORE.: NEVER TRUE

## 2025-07-03 SDOH — ECONOMIC STABILITY: FOOD INSECURITY: WITHIN THE PAST 12 MONTHS, THE FOOD YOU BOUGHT JUST DIDN'T LAST AND YOU DIDN'T HAVE MONEY TO GET MORE.: NEVER TRUE

## 2025-07-03 ASSESSMENT — PATIENT HEALTH QUESTIONNAIRE - PHQ9
SUM OF ALL RESPONSES TO PHQ QUESTIONS 1-9: 0
2. FEELING DOWN, DEPRESSED OR HOPELESS: NOT AT ALL
SUM OF ALL RESPONSES TO PHQ QUESTIONS 1-9: 0
1. LITTLE INTEREST OR PLEASURE IN DOING THINGS: NOT AT ALL

## 2025-07-03 ASSESSMENT — ENCOUNTER SYMPTOMS
CONSTIPATION: 0
ABDOMINAL PAIN: 0
COUGH: 0
SHORTNESS OF BREATH: 0
NAUSEA: 0
DIARRHEA: 0
VOMITING: 0
BLOOD IN STOOL: 0
WHEEZING: 0

## 2025-07-03 NOTE — PROGRESS NOTES
Have you been to the ER, urgent care clinic since your last visit?  Hospitalized since your last visit?   no    Have you seen or consulted any other health care providers outside our system since your last visit?   no    Have you had a mammogram?”   yes    Date of last Mammogram: 9/16/2021       “Have you had a colorectal cancer screening such as a colonoscopy/FIT/Cologuard?    september    No colonoscopy on file  No cologuard on file  No FIT/FOBT on file   No flexible sigmoidoscopy on file

## 2025-07-31 ENCOUNTER — HOSPITAL ENCOUNTER (OUTPATIENT)
Facility: HOSPITAL | Age: 60
Discharge: HOME | End: 2025-07-31
Payer: MEDICARE

## 2025-07-31 DIAGNOSIS — Z12.31 ENCOUNTER FOR SCREENING MAMMOGRAM FOR MALIGNANT NEOPLASM OF BREAST: ICD-10-CM

## 2025-07-31 DIAGNOSIS — E78.5 HYPERLIPIDEMIA, UNSPECIFIED HYPERLIPIDEMIA TYPE: ICD-10-CM

## 2025-07-31 PROCEDURE — 77063 BREAST TOMOSYNTHESIS BI: CPT

## 2025-08-01 ENCOUNTER — PATIENT MESSAGE (OUTPATIENT)
Dept: PRIMARY CARE CLINIC | Facility: CLINIC | Age: 60
End: 2025-08-01

## 2025-08-01 DIAGNOSIS — E78.5 HYPERLIPIDEMIA, UNSPECIFIED HYPERLIPIDEMIA TYPE: Primary | ICD-10-CM

## 2025-08-01 LAB
ALBUMIN SERPL-MCNC: 4 G/DL (ref 3.5–5.2)
ALBUMIN/GLOB SERPL: 1.2 (ref 1.1–2.2)
ALP SERPL-CCNC: 81 U/L (ref 35–104)
ALT SERPL-CCNC: 15 U/L (ref 10–50)
ANION GAP SERPL CALC-SCNC: 10 MMOL/L (ref 2–14)
AST SERPL-CCNC: 24 U/L (ref 10–35)
BILIRUB SERPL-MCNC: 0.9 MG/DL (ref 0–1.2)
BUN SERPL-MCNC: 13 MG/DL (ref 6–20)
BUN/CREAT SERPL: 19 (ref 12–20)
CALCIUM SERPL-MCNC: 9.3 MG/DL (ref 8.6–10)
CHLORIDE SERPL-SCNC: 101 MMOL/L (ref 98–107)
CHOLEST SERPL-MCNC: 214 MG/DL (ref 0–200)
CO2 SERPL-SCNC: 27 MMOL/L (ref 20–29)
CREAT SERPL-MCNC: 0.69 MG/DL (ref 0.6–1)
GLOBULIN SER CALC-MCNC: 3.4 G/DL (ref 2–4)
GLUCOSE SERPL-MCNC: 102 MG/DL (ref 65–100)
HDLC SERPL-MCNC: 87 MG/DL (ref 40–60)
HDLC SERPL: 2.5
LDLC SERPL CALC-MCNC: 101 MG/DL
POTASSIUM SERPL-SCNC: 3.6 MMOL/L (ref 3.5–5.1)
PROT SERPL-MCNC: 7.4 G/DL (ref 6.4–8.3)
SODIUM SERPL-SCNC: 139 MMOL/L (ref 136–145)
TRIGL SERPL-MCNC: 130 MG/DL (ref 0–150)
VLDLC SERPL CALC-MCNC: 26 MG/DL

## 2025-08-04 RX ORDER — EZETIMIBE 10 MG/1
10 TABLET ORAL DAILY
Qty: 90 TABLET | Refills: 1 | Status: SHIPPED | OUTPATIENT
Start: 2025-08-04

## 2025-08-04 RX ORDER — ATORVASTATIN CALCIUM 40 MG/1
40 TABLET, FILM COATED ORAL DAILY
Qty: 90 TABLET | Refills: 0 | Status: SHIPPED | OUTPATIENT
Start: 2025-08-04 | End: 2025-08-05 | Stop reason: SINTOL

## 2025-08-05 RX ORDER — ROSUVASTATIN CALCIUM 20 MG/1
20 TABLET, COATED ORAL DAILY
Qty: 90 TABLET | Refills: 1 | Status: SHIPPED | OUTPATIENT
Start: 2025-08-05